# Patient Record
Sex: FEMALE | Race: WHITE | NOT HISPANIC OR LATINO | Employment: OTHER | URBAN - METROPOLITAN AREA
[De-identification: names, ages, dates, MRNs, and addresses within clinical notes are randomized per-mention and may not be internally consistent; named-entity substitution may affect disease eponyms.]

---

## 2020-03-16 ENCOUNTER — VIRTUAL VISIT (OUTPATIENT)
Dept: FAMILY MEDICINE | Facility: OTHER | Age: 35
End: 2020-03-16

## 2020-03-16 NOTE — PROGRESS NOTES
"Date: 2020 11:40:09  Clinician: Miller Cavanaugh  Clinician NPI: [id number]  Patient: Lexy Sinha  Patient : 1985  Patient Address: [address] [place] [id number]  Patient Phone: [phone]  Visit Protocol: URI  Patient Summary:  Lexy is a 35 year old ( : 1985 ) female who initiated a Visit for COVID-19 (Coronavirus) evaluation and screening. When asked the question \"Please sign me up to receive news, health information and promotions. \", Lexy responded \"No\".    Lexy states her symptoms started 1-2 days ago.   Her symptoms consist of malaise, a headache, chills, and nasal congestion. She is experiencing difficulty breathing due to nasal congestion but she is not short of breath. Lexy also feels feverish.   Symptom details     Nasal secretions: The color of her mucus is yellow.    Temperature: Her current temperature is 99.6 degrees Fahrenheit.     Headache: She states the headache is moderate (4-6 on a 10 point pain scale).      Lexy denies having ear pain, rhinitis, facial pain or pressure, myalgias, wheezing, sore throat, cough, and teeth pain. She also denies having recent facial or sinus surgery in the past 60 days, having a sinus infection within the past year, and taking antibiotic medication for the symptoms.   Precipitating events  She has recently been exposed to someone with influenza. Lexy has been in close contact with the following high risk individuals: pregnant women.   Pertinent COVID-19 (Coronavirus) information  Lexy has not traveled internationally or to the areas where COVID-19 (Coronavirus) is widespread in the last 14 days before the start of her symptoms.   Lexy has not had close contact with a suspected or laboratory-confirmed COVID-19 patient within 14 days of symptom onset.   Lexy is not a healthcare worker and does not work in a healthcare facility.   Pertinent medical history  Lexy does not get yeast infections when she takes antibiotics.   " Lexy does not need a return to work/school note.   Weight: 166 lbs   Lexy does not smoke or use smokeless tobacco.   She denies pregnancy and denies breastfeeding. She is currently menstruating.   Weight: 166 lbs    MEDICATIONS: No current medications, ALLERGIES: NKDA  Clinician Response:  Dear Lexy,  Based on the information provided, you have a viral upper respiratory infection, otherwise known as a cold. Symptoms vary from person to person, but can include sneezing, coughing, a runny nose, sore throat, and headache and range from mild to severe.  Unfortunately, there are no medications that can cure a cold, so treatment is focused on controlling symptoms as much as possible. Most people gradually feel better until symptoms are gone in 1-2 weeks.  Medication information  Because you have a viral infection, antibiotics will not help you get better. Treating a viral infection with antibiotics could actually make you feel worse.  Unless you are allergic to the over-the-counter medication(s) below, I recommend using:       Acetaminophen (Tylenol or store brand) oral tablet. Take 1-2 tablets by mouth every 4-6 hours to help with the discomfort.      Ibuprofen (Advil or store brand) 200 mg oral tablet. Take 1-3 tablets (200-600 mg) by mouth every 8 hours to help with the discomfort. Make sure to take the ibuprofen with food. Do not exceed 2400 mg in 24 hours.    An antihistamine such as Benadryl, Claritin, or store brand.    A decongestant such as Sudafed PE or store brand.     Over-the-counter medications do not require a prescription. Ask the pharmacist if you have any questions.  Self care  The following tips will keep you as comfortable as possible while you recover:     Rest    Drink plenty of water and other liquids    Take a hot shower to loosen congestion     When to seek care  Please be seen in a clinic or urgent care if new symptoms develop, or symptoms become worse.  COVID-19 (Coronavirus) General  Information  With the increase in the number of COVID-19 (Coronavirus) cases, we understand you may have some questions. Below is some helpful information on COVID-19 (Coronavirus).  How can I protect myself and others from the COVID-19 (Coronavirus)?  Because there is currently no vaccine to prevent infection, the best way to protect yourself is to avoid being exposed to this virus. Put distance between yourself and other people if COVID-19 (Coronavirus) is spreading in your community. The virus is thought to spread mainly from person-to-person.     Between people who are in close contact with one another (within about 6 about) for prolonged period (10 minutes or longer).    Through respiratory droplets produced when an infected person coughs or sneezes.     The CDC recommends the following additional steps to protect yourself and others:     Wash your hands often with soap and water for at least 20 seconds, especially after blowing your nose, coughing, or sneezing; going to the bathroom; and before eating or preparing food.  Use an alcohol-based hand  that contains at least 60 percent alcohol if soap and water are not available.        Avoid touching your eyes, nose and mouth with unwashed hands.    Avoid close contact with people who are sick.    Stay home when you are sick.    Cover your cough or sneeze with a tissue, then throw the tissue in the trash.    Clean and disinfect frequently touched objects and surfaces.     You can help stop COVID-19 (Coronavirus) by knowing the signs and symptoms:     Fever    Cough    Shortness of breath     Contact your healthcare provider if   Develop symptoms   AND   Have been in close contact with a person known to have COVID-19 (Coronavirus) or live in or have recently traveled from an area with ongoing spread of COVID-19 (Coronavirus). Call ahead before you go to a doctor's office or emergency room. Tell them about your recent travel and your symptoms.   For the  most up to date information, visit the CDC's website.  Steps to help prevent the spread of COVID-19 (Coronavirus) if you are sick  If you are sick with COVID-19 (Coronavirus) or suspect you are infected with the virus that causes COVID-19 (Coronavirus), follow the steps below to help prevent the disease from spreading&nbsp;to people in your home and community.     Stay home except to get medical care. Home isolation may be started in consultation with your healthcare clinician.    Separate yourself from other people and animals in your home.    Call ahead before visiting your doctor if you have a medical appointment.    Wear a facemask when you are around other people.    Cover your cough and sneezes.    Clean your hands often.    Avoid sharing personal household items.    Clean and disinfect frequently touched objects and surfaces everyday.    You will need to have someone drop off medications or household supplies (if needed) at your house without coming inside or in contact with you or others living in your house.    Monitor your symptoms and seek prompt medical care if your illness is worsening (e.g. Difficulty breathing).    Discontinue home isolation only in consultation with your healthcare provider.     For more detailed and up to date information on what to do if you are sick, visit this link: What to Do If You Are Sick With Coronavirus Disease 2019 (COVID-19).  Do I need to be tested for COVID-19 (Coronavirus)?     At this time, the limited number of tests available are controlled by the state and local health departments and are being reserved for more seriously ill patients, those with known exposure to confirmed patients, and those with recent travel (within 14 days) to countries with high rates of COVID-19 (Coronavirus).    Decisions on which patients receive testing will be based on the local spread of COVID-19 (Coronavirus) as well as the symptoms. Your healthcare provider will make the final  "decision on whether you should be tested.    In the meantime, if you have concerns that you may have been exposed, it is reasonable to practice \"social distancing.\"&nbsp; If you are ill with a cold or flu-like illness, please monitor your symptoms and reach out to your healthcare provider if your symptoms worsen.    For more up to date information, visit this link: COVID-19 (Coronavirus) Frequently Asked Questions and Answers.      Diagnosis: Viral URI  Diagnosis ICD: J06.9  "

## 2023-01-21 NOTE — TELEPHONE ENCOUNTER
RECORDS RECEIVED FROM: [facility]   DATE RECEIVED: 2.1.23   NOTES (Gather within 2 years) STATUS DETAILS   OFFICE NOTE from referring provider   CE 1.17.23, 1.13.23, 12.19.22  Petty LOPEZ   LABS (any labs) [facility]    MEDICATION LIST [facility]

## 2023-02-01 ENCOUNTER — PRE VISIT (OUTPATIENT)
Dept: INFECTIOUS DISEASES | Facility: CLINIC | Age: 38
End: 2023-02-01
Payer: COMMERCIAL

## 2023-05-31 ENCOUNTER — TRANSFERRED RECORDS (OUTPATIENT)
Dept: HEALTH INFORMATION MANAGEMENT | Facility: CLINIC | Age: 38
End: 2023-05-31

## 2023-09-11 ENCOUNTER — OFFICE VISIT (OUTPATIENT)
Dept: FAMILY MEDICINE | Facility: CLINIC | Age: 38
End: 2023-09-11
Payer: COMMERCIAL

## 2023-09-11 VITALS
SYSTOLIC BLOOD PRESSURE: 118 MMHG | BODY MASS INDEX: 30.04 KG/M2 | WEIGHT: 180.3 LBS | HEART RATE: 74 BPM | TEMPERATURE: 97.6 F | DIASTOLIC BLOOD PRESSURE: 80 MMHG | HEIGHT: 65 IN | OXYGEN SATURATION: 99 % | RESPIRATION RATE: 20 BRPM

## 2023-09-11 DIAGNOSIS — Z12.4 CERVICAL CANCER SCREENING: ICD-10-CM

## 2023-09-11 DIAGNOSIS — Z13.220 LIPID SCREENING: ICD-10-CM

## 2023-09-11 DIAGNOSIS — E66.811 CLASS 1 OBESITY WITH SERIOUS COMORBIDITY AND BODY MASS INDEX (BMI) OF 30.0 TO 30.9 IN ADULT, UNSPECIFIED OBESITY TYPE: Primary | ICD-10-CM

## 2023-09-11 DIAGNOSIS — R03.0 ELEVATED BLOOD PRESSURE READING WITHOUT DIAGNOSIS OF HYPERTENSION: ICD-10-CM

## 2023-09-11 DIAGNOSIS — Z13.1 SCREENING FOR DIABETES MELLITUS: ICD-10-CM

## 2023-09-11 PROBLEM — M76.62 ACHILLES TENDINITIS OF BOTH LOWER EXTREMITIES: Status: ACTIVE | Noted: 2023-07-19

## 2023-09-11 PROBLEM — N20.1 URETERAL STONE: Status: ACTIVE | Noted: 2020-11-24

## 2023-09-11 PROBLEM — M76.61 ACHILLES TENDINITIS OF BOTH LOWER EXTREMITIES: Status: ACTIVE | Noted: 2023-07-19

## 2023-09-11 PROCEDURE — 99204 OFFICE O/P NEW MOD 45 MIN: CPT | Performed by: FAMILY MEDICINE

## 2023-09-11 ASSESSMENT — PAIN SCALES - GENERAL: PAINLEVEL: NO PAIN (0)

## 2023-09-11 NOTE — PATIENT INSTRUCTIONS
Weight loss -   - semaglutide (OZEMPIC) 2 MG/3ML pen; Inject 0.25 mg Subcutaneous every 7 days for 30 days     Blood pressure -   Low salt diet and activity as tolerated  Monitor your home blood pressures once every other day

## 2023-09-11 NOTE — PROGRESS NOTES
Assessment & Plan     1. Class 1 obesity with serious comorbidity BMI 30.0-30.9,adult  - Ozempic is covered under her insurance.   - Discussed side effects of medication.   - Scheduled for one month visit.   - semaglutide (OZEMPIC) 2 MG/3ML pen; Inject 0.25 mg Subcutaneous every 7 days for 30 days  Dispense: 1.5 mL; Refill: 0    2. Elevated blood pressure reading without diagnosis of hypertension  - Today BP stable  - advised below   Low salt diet and activity as tolerated  Monitor your home blood pressures once every other day    3. Cervical cancer screening  - will schedule with GYN     Lexy Sinha   to [facility] (supporting Percio Gaston MD)   MB      9/14/23  2:03 PM  Hi -     I had a rather scary situation happen to me last night after an MRI (for numbness in L hand) - I had double/blurry vision afterwards and a BP reading of 152/125. The doctors almost sent me to the ER but sent me home instead. Since then, my readings have been high each time (and have been consistently high over the past few months with at-home tracking). I know my reading was low at our recent visit, but I suspect that may be an error as it has been high since March when I began tracking. I am concerned that I should be on medication and am worried about waiting too much longer. I will be leaving for a work trip 9/19-9/25 and would like to figure out what we should do before that, if at all possible.      I would also like new blood work done, if I could (I know I said no at our visit, but think it might be a good idea to get it re-done in this new system.)     Thank you!  Lexy Gaston MD  [facility]    Ella Pugh is a 38 year old, presenting for the following health issues:  Hypertension and Recheck Medication      9/11/2023     8:22 AM   Additional Questions   Roomed by Geni   Accompanied by alone         9/11/2023     8:22 AM   Patient  Reported Additional Medications   Patient reports taking the following new medications none       History of Present Illness       Hypertension: She presents for follow up of hypertension.  She does not check blood pressure  regularly outside of the clinic. Outside blood pressures have been over 140/90. She does not follow a low salt diet.     Reason for visit:  Primary care, blood pressure, weight management    She eats 2-3 servings of fruits and vegetables daily.She consumes 0 sweetened beverage(s) daily.She exercises with enough effort to increase her heart rate 30 to 60 minutes per day.  She exercises with enough effort to increase her heart rate 3 or less days per week.   She is taking medications regularly.     Few months ago she was prescribed medication for blood pressure. She tracked home BP. She travels a lot for work and didn't fill the script. March 2023 - 141/96, 124/99, 118/99, 131/100, 133/99, 144/97. She hasn't tracked recent BP. She is not monitoring salt intake. She was working with  and achilles tendonitis has keep activity limited. She has a achilles procedure scheduled in Nov 2023. She used to walk everyday. She will go for a walk and then will be set back due to pain.     Wegovy was not accepted, Ozempic would cost more money. Since then she has changed insurances and wants to see if it would be covered.         Review of Systems         Objective    LMP 08/24/2023   There is no height or weight on file to calculate BMI.  Physical Exam

## 2023-09-14 ENCOUNTER — NURSE TRIAGE (OUTPATIENT)
Dept: FAMILY MEDICINE | Facility: CLINIC | Age: 38
End: 2023-09-14
Payer: COMMERCIAL

## 2023-09-14 ENCOUNTER — NURSE TRIAGE (OUTPATIENT)
Dept: FAMILY MEDICINE | Facility: CLINIC | Age: 38
End: 2023-09-14

## 2023-09-14 RX ORDER — AMLODIPINE BESYLATE 5 MG/1
5 TABLET ORAL DAILY
Qty: 30 TABLET | Refills: 1 | Status: SHIPPED | OUTPATIENT
Start: 2023-09-14 | End: 2023-10-11

## 2023-09-14 NOTE — ADDENDUM NOTE
Addended by: SHELLIE CARR Y on: 9/14/2023 03:19 PM     Modules accepted: Orders, Level of Service

## 2023-09-14 NOTE — TELEPHONE ENCOUNTER
Ordered lab, please have pt schedule lab only visit  Sent in treatment for Norvasc 5 mg daily, continue to monitor home BP  I would recommend seeing eye provider for vision changes  DM

## 2023-09-14 NOTE — TELEPHONE ENCOUNTER
Called patient. No answer. LVM to call back and ask to speak to a triage nurse.    Patient sent concerning [employer]ISc[employer] message about L hand numbness and blood pressure of 152/125. See 9/14/23 Mychart encounter.     Was last see in office on 9/11/23 and BP was 118/80.     Altagracia Pedraza RN  [facility]

## 2023-09-14 NOTE — TELEPHONE ENCOUNTER
Dr Gaston,    Pt would like to start on medication for hypertension. Please advise. Pt was just seen on 9/11/23 for hypertension.     Rere FULLER RN  [facility]      Nurse Triage SBAR    Is this a 2nd Level Triage? YES, LICENSED PRACTITIONER REVIEW IS REQUIRED    Situation: Pt very hypertensive with recurrent BP readings  142/112 (self check) and pulse of 64. During MRI testing, pt had a near fainting spell.    Background: Pt has had chronic elevated BP since 2021, is severely obese, and chronic numbness and tingling in hands and arm. Pt was recently started on Ozempic for wt loss.     Assessment: No LOC with near fainting episode, A/O x4, reports no cardiac and respiratory issues, BP remains elevated at 142/112.    Protocol Recommended Disposition:   Home Care    Recommendation: Pt is currently not currently being treated for hypertension. Please provide advise.      Routed to provider    Does the patient meet one of the following criteria for ADS visit consideration? No     Reason for Disposition   Sudden standing caused simple fainting, and now alert and feels fine    Additional Information   Negative: Still unconscious   Negative: Still feels dizzy or lightheaded   Negative: Difficult to awaken or acting confused (e.g., disoriented, slurred speech)   Negative: Difficulty breathing   Negative: Bluish (or gray) lips or face   Negative: Shock suspected (e.g., cold/pale/clammy skin, too weak to stand, low BP, rapid pulse)   Negative: Bleeding (e.g., vomiting blood, rectal bleeding or tarry stools, severe vaginal bleeding)   Negative: Chest pain   Negative: Extra heartbeats, irregular heart beating, or heart is beating very fast (i.e., 'palpitations')   Negative: Heart beating < 50 beats per minute OR > 140 beats per minute   Negative: Fainted suddenly after medicine, allergic food or bee sting   Negative: Sounds like a life-threatening emergency to the triager   Negative: Has diabetes  "(diabetes mellitus) and fainting from low blood glucose (70 mg/dl [3.9 mmol/l] or below)   Negative: Seizure suspected (e.g., muscle jerking or shaking followed by confusion)   Negative: Heat exhaustion suspected (i.e., dehydration from heat exposure)   Negative: Fainted > 15 minutes ago and still looks pale (pale skin, pallor)   Negative: Fainted > 15 minutes ago and still feels weak or dizzy   Negative: History of heart problems or congestive heart failure   Negative: Occurred during exercise   Negative: Any head or face injury   Negative: Age > 50 years   Negative: Drinking very little and dehydration suspected (e.g., no urine > 12 hours, very dry mouth, very lightheaded)   Negative: Fainted 2 times in one day   Negative: Patient sounds very sick or weak to the triager   Negative: All other patients, and now alert and feels fine  (Exception: SIMPLE FAINT due to stress, pain, prolonged standing, or suddenly standing.)   Negative: Patient wants to be seen   Negative: Simple fainting is a chronic symptom (has occurred multiple times)    Answer Assessment - Initial Assessment Questions  1. ONSET: \"How long were you unconscious?\" (minutes) \"When did it happen?\"     Fainting occurred after MRI, no LOC  2. CONTENT: \"What happened during period of unconsciousness?\" (e.g., seizure activity)       No LOC    3. MENTAL STATUS: \"Alert and oriented now?\" (oriented x 3 = name, month, location)       A/O x4    4. TRIGGER: \"What do you think caused the fainting?\" \"What were you doing just before you fainted?\"  (e.g., exercise, sudden standing up, prolonged standing)      Just felt faint, did not actually fainted    5. RECURRENT SYMPTOM: \"Have you ever passed out before?\" If Yes, ask: \"When was the last time?\" and \"What happened that time?\"       Never happened before    6. INJURY: \"Did you sustain any injury during the fall?\"       No fall    7. CARDIAC SYMPTOMS: \"Have you had any of the following symptoms: chest pain, difficulty " "breathing, palpitations?\"      No    8. NEUROLOGIC SYMPTOMS: \"Have you had any of the following symptoms: headache, numbness, vertigo, weakness?\"      None    9. GI SYMPTOMS: \"Have you had any of the following symptoms: abdomen pain, vomiting, diarrhea, blood in stools?\"      No    10. OTHER SYMPTOMS: \"Do you have any other symptoms?\"        None  11. PREGNANCY: \"Is there any chance you are pregnant?\" \"When was your last menstrual period?\"        No    Protocols used: [id number]    "

## 2023-09-14 NOTE — TELEPHONE ENCOUNTER
Writer called patient.   Lab. Scheduled lab only appointment.   Norvasc ordered  Recommend eye appointment for vision changes.     Patient stated understanding.     KRAIG Garcia RN  [facility]

## 2023-09-15 ENCOUNTER — LAB (OUTPATIENT)
Dept: LAB | Facility: CLINIC | Age: 38
End: 2023-09-15
Payer: COMMERCIAL

## 2023-09-15 DIAGNOSIS — Z13.220 LIPID SCREENING: ICD-10-CM

## 2023-09-15 DIAGNOSIS — Z13.1 SCREENING FOR DIABETES MELLITUS: ICD-10-CM

## 2023-09-15 DIAGNOSIS — R03.0 ELEVATED BLOOD PRESSURE READING WITHOUT DIAGNOSIS OF HYPERTENSION: ICD-10-CM

## 2023-09-15 LAB
ANION GAP SERPL CALCULATED.3IONS-SCNC: 11 MMOL/L (ref 7–15)
BUN SERPL-MCNC: 17.6 MG/DL (ref 6–20)
CALCIUM SERPL-MCNC: 9.1 MG/DL (ref 8.6–10)
CHLORIDE SERPL-SCNC: 105 MMOL/L (ref 98–107)
CHOLEST SERPL-MCNC: 251 MG/DL
CREAT SERPL-MCNC: 0.91 MG/DL (ref 0.51–0.95)
DEPRECATED HCO3 PLAS-SCNC: 20 MMOL/L (ref 22–29)
EGFRCR SERPLBLD CKD-EPI 2021: 82 ML/MIN/1.73M2
GLUCOSE SERPL-MCNC: 80 MG/DL (ref 70–99)
HBA1C MFR BLD: 5.1 % (ref 0–5.6)
HDLC SERPL-MCNC: 48 MG/DL
LDLC SERPL CALC-MCNC: 182 MG/DL
NONHDLC SERPL-MCNC: 203 MG/DL
POTASSIUM SERPL-SCNC: 4.7 MMOL/L (ref 3.4–5.3)
SODIUM SERPL-SCNC: 136 MMOL/L (ref 136–145)
TRIGL SERPL-MCNC: 104 MG/DL
TSH SERPL DL<=0.005 MIU/L-ACNC: 1.18 UIU/ML (ref 0.3–4.2)

## 2023-09-15 PROCEDURE — 84443 ASSAY THYROID STIM HORMONE: CPT

## 2023-09-15 PROCEDURE — 80048 BASIC METABOLIC PNL TOTAL CA: CPT

## 2023-09-15 PROCEDURE — 83036 HEMOGLOBIN GLYCOSYLATED A1C: CPT

## 2023-09-15 PROCEDURE — 80061 LIPID PANEL: CPT

## 2023-09-15 PROCEDURE — 36415 COLL VENOUS BLD VENIPUNCTURE: CPT

## 2023-10-07 ENCOUNTER — HEALTH MAINTENANCE LETTER (OUTPATIENT)
Age: 38
End: 2023-10-07

## 2023-10-11 ENCOUNTER — VIRTUAL VISIT (OUTPATIENT)
Dept: FAMILY MEDICINE | Facility: CLINIC | Age: 38
End: 2023-10-11
Payer: COMMERCIAL

## 2023-10-11 DIAGNOSIS — I10 PRIMARY HYPERTENSION: ICD-10-CM

## 2023-10-11 DIAGNOSIS — M25.511 RIGHT SHOULDER PAIN, UNSPECIFIED CHRONICITY: ICD-10-CM

## 2023-10-11 DIAGNOSIS — E66.811 CLASS 1 OBESITY WITH SERIOUS COMORBIDITY AND BODY MASS INDEX (BMI) OF 30.0 TO 30.9 IN ADULT, UNSPECIFIED OBESITY TYPE: ICD-10-CM

## 2023-10-11 DIAGNOSIS — L70.9 ACNE, UNSPECIFIED ACNE TYPE: ICD-10-CM

## 2023-10-11 PROCEDURE — 99214 OFFICE O/P EST MOD 30 MIN: CPT | Mod: VID | Performed by: FAMILY MEDICINE

## 2023-10-11 RX ORDER — CLINDAMYCIN PHOSPHATE 11.9 MG/ML
SOLUTION TOPICAL 2 TIMES DAILY
Qty: 60 ML | Refills: 1 | Status: SHIPPED | OUTPATIENT
Start: 2023-10-11

## 2023-10-11 RX ORDER — SPIRONOLACTONE 25 MG/1
25 TABLET ORAL 2 TIMES DAILY
Qty: 60 TABLET | Refills: 1 | Status: SHIPPED | OUTPATIENT
Start: 2023-10-11 | End: 2023-11-15

## 2023-10-11 RX ORDER — AMLODIPINE BESYLATE 5 MG/1
7.5 TABLET ORAL DAILY
Qty: 135 TABLET | Refills: 0 | Status: SHIPPED | OUTPATIENT
Start: 2023-10-11 | End: 2023-10-11

## 2023-10-11 NOTE — PATIENT INSTRUCTIONS
Acne and blood pressure -   Spironolactone 25 mg twice daily  Please continue to monitor your blood pressure  Hold afternoon Spironolactone dose if you are experiencing light headiness or dizziness.     Weight -   Increase Ozempic from 0.25 mg to 0.5 mg once per week     Acne -   Starting clindamycin twice daily     Hives -   Ok to use over the counter allegra once daily for 7 days    Right shoulder -   Follow up with orthopedic provider

## 2023-10-11 NOTE — PROGRESS NOTES
Lexy is a 38 year old who is being evaluated via a billable video visit.      How would you like to obtain your AVS? MyChart  If the video visit is dropped, the invitation should be resent by: Text to cell phone: [phone]  Will anyone else be joining your video visit? No          Assessment & Plan     1. Primary hypertension  - Albumin Random Urine Quantitative with Creat Ratio; Future  - spironolactone (ALDACTONE) 25 MG tablet; Take 1 tablet (25 mg) by mouth 2 times daily for 30 days  Dispense: 60 tablet; Refill: 1    2. Class 1 obesity with serious comorbidity and body mass index (BMI) of 30.0 to 30.9 in adult, unspecified obesity type  semaglutide (OZEMPIC) 2 MG/3ML pen  3 mL 0 10/11/2023  No   Sig - Route: Inject 0.5 mg Subcutaneous every 7 days for 30 days - Subcutaneous     3. Acne, unspecified acne type  - spironolactone (ALDACTONE) 25 MG tablet; Take 1 tablet (25 mg) by mouth 2 times daily for 30 days  Dispense: 60 tablet; Refill: 1  - clindamycin (CLEOCIN T) 1 % external solution; Apply topically 2 times daily  Dispense: 60 mL; Refill: 1    4. Right shoulder pain, unspecified chronicity  - Orthopedic  Referral; Future     Patient Instructions   Acne and blood pressure -   Spironolactone 25 mg twice daily  Please continue to monitor your blood pressure  Hold afternoon Spironolactone dose if you are experiencing light headiness or dizziness.     Weight -   Increase Ozempic from 0.25 mg to 0.5 mg once per week     Acne -   Starting clindamycin twice daily     Hives -   Ok to use over the counter allegra once daily for 7 days    Right shoulder -   Follow up with orthopedic provider             Perico Gaston MD  [facility]    Subjective   Lexy is a 38 year old, presenting for the following health issues:  Recheck Medication (Med Check ), Hypertension, Hives (Hives on Chest at Night ), Derm Problem (Acne ), and Pain (Shoulder Right pain)      10/11/2023     9:00  AM   Additional Questions   Roomed by Lisa Lange       Pain    History of Present Illness       Hypertension: She presents for follow up of hypertension.  She does check blood pressure  regularly outside of the clinic. Outside blood pressures have been over 140/90. She follows a low salt diet.     Reason for visit:  Follow up on high blood pressure, Ozempic, want to ask about shoulder blade pain    She eats 2-3 servings of fruits and vegetables daily.She consumes 0 sweetened beverage(s) daily.She exercises with enough effort to increase her heart rate 20 to 29 minutes per day.  She exercises with enough effort to increase her heart rate 5 days per week.   She is taking medications regularly.     121/89, 123/87, 120/87, 129/92, 123/91     No light headiness, dizziness, chest pain or shortness of breath.     No side effects with norvasc 5 mg     She is really liking ozempic. She notes that food noise is gone. She has started yoga and doing more walks. Side effects - diarrhea and fatigue and feeling better now. Today she is 172.2 lbs. Yesterday she increased ozempic dose to 0.5 mg/week and no side effects.     She has been dealing with cystic acne for sometime. She has tried doxycyline but got rash. She used to be on birth control and that helped. She doesn't want to get back on birth control.     The past couple of days she was having hives and only in her boobs at night. Yesterday and last night it was calm. No new products or food.     She gets repetitive stress injury. She is now having pain in the right shoulder. She had an injection ten years ago which helped. She has difficulty knitting.     Review of Systems         Objective    Vitals - Patient Reported  Systolic (Patient Reported): 121 (10/6/23)  Diastolic (Patient Reported): 89  Pain Score: Mild Pain (3)  Pain Loc: Shoulder        Physical Exam   GENERAL: Healthy, alert and no distress  EYES: Eyes grossly normal to inspection.  No discharge or erythema, or  obvious scleral/conjunctival abnormalities.  RESP: No audible wheeze, cough, or visible cyanosis.  No visible retractions or increased work of breathing.    SKIN: Visible skin clear. No significant rash, abnormal pigmentation or lesions.  NEURO: Cranial nerves grossly intact.  Mentation and speech appropriate for age.  PSYCH: Mentation appears normal, affect normal/bright, judgement and insight intact, normal speech and appearance well-groomed.                Video-Visit Details    Type of service:  Video Visit     Originating Location (pt. Location): Home    Distant Location (provider location):  On-site  Platform used for Video Visit: Janet

## 2023-10-16 NOTE — TELEPHONE ENCOUNTER
DIAGNOSIS: (R) shoulder pain / Perico Gaston MD / [facility] / no imaging or surgeries    APPOINTMENT DATE: 10/30/23   NOTES STATUS DETAILS   OFFICE NOTE from referring provider Internal 10/11/23 - Perico Gaston MD   MEDICATION LIST Internal

## 2023-10-22 ENCOUNTER — MYC MEDICAL ADVICE (OUTPATIENT)
Dept: FAMILY MEDICINE | Facility: CLINIC | Age: 38
End: 2023-10-22
Payer: COMMERCIAL

## 2023-10-22 DIAGNOSIS — L50.9 HIVES: Primary | ICD-10-CM

## 2023-10-24 NOTE — TELEPHONE ENCOUNTER
"Dr. Gaston-Please review and advise.  Dermatology referral pended.  In the meantime, should patient try nightly Benadryl?  Perhaps cool compresses and/or small dabs of Hydrocortisone cream to especially bothersome areas?    \"Hi there -     The hives I mentioned at my last visit that appear almost every night on my breasts have not gone away. I know you said if they were persistent, I should see someone. Could you recommend next steps?     Thank you,  Lexy\"    Thank you!  ANGELITO GoldmanN, RN-[facility]    "
We discussed using daily Allegra.   If allegra not helpful then ok to add Benadryl at night.  DM  
Writer responded via Paper[employer]li.  ANGELITO GoldmanN, RN-BC  [facility]    
40
45

## 2023-10-25 ENCOUNTER — TELEPHONE (OUTPATIENT)
Dept: DERMATOLOGY | Facility: CLINIC | Age: 38
End: 2023-10-25
Payer: COMMERCIAL

## 2023-10-25 NOTE — TELEPHONE ENCOUNTER
Patient Contact    Attempt # 1    Was call answered?  No.    Called patient. No answer. Left message to call back. Clinic number was provided.     Justa Alejandra RN    [facility]   [phone]

## 2023-10-25 NOTE — TELEPHONE ENCOUNTER
This encounter is being sent to inform the clinic that this patient has a referral from Dr. Perico camara for the diagnoses of Hives and has requested that this patient be seen within 1-2 weeks.  Based on the availability of our provider(s), we are unable to accommodate this request.    Were all sites offered this patient?  Yes patient is ok to schedule at any location.    Does scheduling algorithm request to schedule next available?  Patient has been scheduled for the first available opening with Dr. Zunilda Christensen on 3/28/2024.  We have informed the patient that the clinic will review their referral and reach out if a sooner appointment is medically necessary.

## 2023-10-26 NOTE — TELEPHONE ENCOUNTER
Patient Contact    Attempt # 2    Was call answered?  No    Called patient. No answer. Left message to call back. Clinic number was provided.     Justa Alejandra RN    [facility]   [phone]       unchanged

## 2023-10-30 ENCOUNTER — PRE VISIT (OUTPATIENT)
Dept: ORTHOPEDICS | Facility: CLINIC | Age: 38
End: 2023-10-30

## 2023-10-30 ENCOUNTER — OFFICE VISIT (OUTPATIENT)
Dept: ORTHOPEDICS | Facility: CLINIC | Age: 38
End: 2023-10-30
Attending: FAMILY MEDICINE
Payer: COMMERCIAL

## 2023-10-30 DIAGNOSIS — M54.9 UPPER BACK PAIN ON RIGHT SIDE: Primary | ICD-10-CM

## 2023-10-30 PROCEDURE — 99203 OFFICE O/P NEW LOW 30 MIN: CPT | Performed by: FAMILY MEDICINE

## 2023-10-30 NOTE — PROGRESS NOTES
Sports Medicine Clinic Visit    PCP: Perico Gaston Mony Sinha is a 38 year old female who is seen  in consultation at the request of Dr. Gaston presenting with right upper back pain.  She notices it in the muscular area of the right upper back and medial shoulder blade.  No left-sided shoulder symptoms.  No radicular discomfort into the right arm, no numbness or tingling into the right arm.    Injury: Pt notes no specific injuries    Location of Pain: right shoulder blade/upper back  Duration of Pain: 3 year(s), intermittent, recurrent  Rating of Pain: 4/10  Pain is better with: Massage, rest, activity avoidance  Pain is worse with: Knitting, mouse work  Additional Features: Pain  Treatment so far consists of: Massage, rest, activity avoidance  Prior History of related problems: None    LMP 10/07/2023       Patient had mid back trigger point injections at [facility] pain clinic 9/16/2015, for myofascial pain.    MRI of the cervical spine with and without contrast 9/14/2023 showed no significant right-sided central or foraminal stenosis.    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   CONTRAST: GADOBUTROL 1 MMOL/ML IV SOLN 7.5 mL   TECHNIQUE: MRI Cervical Spine without and with IV contrast.   LIMITATIONS: Postcontrast sequences are mildly limited by motion degradation.     FINDINGS:     Vertebral body heights are maintained. No suspicious focal marrow replacing lesions. No focal marrow edema. No cord signal abnormality. No definite enhancement of the cervical spinal cord accounting for motion artifact. Subcentimeter T2 hyperintense nodule in the left lobe of the thyroid gland, which does not require specific imaging follow-up based on lesion size.      Craniovertebral junction and C1-C2: Normal.     C2-C3: Normal disc height. No herniation. Normal facets. No spinal canal or neural foraminal stenosis.     C3-C4: Normal disc height. Mild right neural foraminal stenosis secondary to uncovertebral spurring and slight facet hypertrophy.     C4-C5: Normal disc height. No herniation. Normal facets. No spinal canal or neural foraminal stenosis.     C5-C6: Normal disc height. Small left eccentric posterior disc osteophyte complex without substantial spinal canal or neural foraminal stenosis.     C6-C7: Normal disc height. Tiny posterior disc osteophyte complex without substantial spinal canal or neural foraminal stenosis.     C7-T1: Normal disc height. No herniation. Normal facets. No spinal canal or neural foraminal stenosis.     IMPRESSION:     1. No cord signal abnormality. No definite cord enhancement within limitations of mild motion degradation.   2.  No high-grade spinal canal or neural foraminal stenosis. Mild right neural foraminal stenosis at C3-C4.         XR Chest 2 Views  Narrative    COMPARISON:  None.    FINDINGS:  Two views were obtained.  The lungs and costophrenic angles are clear.  Heart size and pulmonary vascularity are within normal limits.  There is no evidence of pneumothorax or pleural effusion. Bony thorax is unremarkable.  Exam End: 02/19/19 10:27 AM         PMH:  Past Medical History:   Diagnosis Date    Acne        Active problem list:  Patient Active Problem List   Diagnosis    Nephrolithiasis    Achilles tendinitis of both lower extremities    Dysmenorrhea    Ureteral stone    Vaginismus       FH:  Family History   Problem Relation Age of Onset    Hypertension Mother     Hypertension Father        SH:  Social History     Socioeconomic History    Marital status:      Spouse name: Not on file    Number of children: Not on file    Years of education: Not on file    Highest education level: Not on file   Occupational History    Not on  file   Tobacco Use    Smoking status: Never    Smokeless tobacco: Never   Vaping Use    Vaping Use: Never used   Substance and Sexual Activity    Alcohol use: No    Drug use: No    Sexual activity: Not Currently   Other Topics Concern    Not on file   Social History Narrative    Not on file     Social Determinants of Health     Financial Resource Strain: Low Risk  (10/4/2023)    Financial Resource Strain     Within the past 12 months, have you or your family members you live with been unable to get utilities (heat, electricity) when it was really needed?: No   Food Insecurity: Low Risk  (10/4/2023)    Food Insecurity     Within the past 12 months, did you worry that your food would run out before you got money to buy more?: No     Within the past 12 months, did the food you bought just not last and you didn t have money to get more?: No   Transportation Needs: Low Risk  (10/4/2023)    Transportation Needs     Within the past 12 months, has lack of transportation kept you from medical appointments, getting your medicines, non-medical meetings or appointments, work, or from getting things that you need?: No   Physical Activity: Not on file   Stress: Not on file   Social Connections: Not on file   Interpersonal Safety: Not on file   Housing Stability: Low Risk  (10/4/2023)    Housing Stability     Do you have housing? : Yes     Are you worried about losing your housing?: No       MEDS:  See EMR, reviewed  ALL:  See EMR, reviewed    REVIEW OF SYSTEMS:  CONSTITUTIONAL:NEGATIVE for fever, chills, change in weight  INTEGUMENTARY/SKIN: NEGATIVE for worrisome rashes, moles or lesions  EYES: NEGATIVE for vision changes or irritation  ENT/MOUTH: NEGATIVE for ear, mouth and throat problems  RESP:NEGATIVE for significant cough or SOB  BREAST: NEGATIVE for masses, tenderness or discharge  CV: NEGATIVE for chest pain, palpitations or peripheral edema  GI: NEGATIVE for nausea, abdominal pain, heartburn, or change in bowel  habits  :NEGATIVE for frequency, dysuria, or hematuria  :NEGATIVE for frequency, dysuria, or hematuria  NEURO: NEGATIVE for weakness, dizziness or paresthesias  ENDOCRINE: NEGATIVE for temperature intolerance, skin/hair changes  HEME/ALLERGY/IMMUNE: NEGATIVE for bleeding problems  PSYCHIATRIC: NEGATIVE for changes in mood or affect      Objective: She has no impingement signs at the right shoulder.  No scapular winging.  Skin about the shoulder blade appears normal.  Muscular tenderness along the right upper back and medial shoulder blade.  5-5 strength bilaterally at deltoid, supraspinatus, infraspinatus and subscapularis.  In addition biceps, triceps, wrist extension, grasp strength 5 out of 5 bilaterally with no deficits noted.  Sensation is intact in the bilateral upper extremities.  Tends towards a head forward, shoulder forward posture.  Appropriate conversation and affect.    Assessment: Muscular ligamentous right-sided upper back discomfort    Plan: We discussed options including maximizing the ergonomics of the seated position, inserting exercise into her lifestyle such as Pilates or water aerobics or yoga with core strength.  We discussed physical therapy protocols for the upper back and posterior shoulder.  We discussed massage.  Repeat trigger point injections offered, declined for now.  Patient would like to see a physical therapist in [place].  Referral placed.

## 2023-10-30 NOTE — LETTER
10/30/2023      RE: Lexy Sinha  [address]  [facility] [address]     Dear Colleague,    Thank you for referring your patient, Lexy Sinha, to the [facility] [place]. Please see a copy of my visit note below.    Sports Medicine Clinic Visit    PCP: Perico Gaston Y    Lexy Sinha is a 38 year old female who is seen  in consultation at the request of Dr. Gaston presenting with right upper back pain.  She notices it in the muscular area of the right upper back and medial shoulder blade.  No left-sided shoulder symptoms.  No radicular discomfort into the right arm, no numbness or tingling into the right arm.    Injury: Pt notes no specific injuries    Location of Pain: right shoulder blade/upper back  Duration of Pain: 3 year(s), intermittent, recurrent  Rating of Pain: 4/10  Pain is better with: Massage, rest, activity avoidance  Pain is worse with: Knitting, mouse work  Additional Features: Pain  Treatment so far consists of: Massage, rest, activity avoidance  Prior History of related problems: None    LMP 10/07/2023       Patient had mid back trigger point injections at [facility] pain clinic 9/16/2015, for myofascial pain.    MRI of the cervical spine with and without contrast 9/14/2023 showed no significant right-sided central or foraminal stenosis.    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   CONTRAST: GADOBUTROL 1 MMOL/ML IV SOLN 7.5 mL   TECHNIQUE: MRI Cervical Spine without and with IV contrast.   LIMITATIONS: Postcontrast sequences are mildly limited by motion degradation.     FINDINGS:     Vertebral body heights are maintained. No suspicious focal marrow replacing lesions. No focal marrow edema. No cord  signal abnormality. No definite enhancement of the cervical spinal cord accounting for motion artifact. Subcentimeter T2 hyperintense nodule in the left lobe of the thyroid gland, which does not require specific imaging follow-up based on lesion size.     Craniovertebral junction and C1-C2: Normal.     C2-C3: Normal disc height. No herniation. Normal facets. No spinal canal or neural foraminal stenosis.     C3-C4: Normal disc height. Mild right neural foraminal stenosis secondary to uncovertebral spurring and slight facet hypertrophy.     C4-C5: Normal disc height. No herniation. Normal facets. No spinal canal or neural foraminal stenosis.     C5-C6: Normal disc height. Small left eccentric posterior disc osteophyte complex without substantial spinal canal or neural foraminal stenosis.     C6-C7: Normal disc height. Tiny posterior disc osteophyte complex without substantial spinal canal or neural foraminal stenosis.     C7-T1: Normal disc height. No herniation. Normal facets. No spinal canal or neural foraminal stenosis.     IMPRESSION:     1. No cord signal abnormality. No definite cord enhancement within limitations of mild motion degradation.   2.  No high-grade spinal canal or neural foraminal stenosis. Mild right neural foraminal stenosis at C3-C4.         XR Chest 2 Views  Narrative    COMPARISON:  None.    FINDINGS:  Two views were obtained.  The lungs and costophrenic angles are clear.  Heart size and pulmonary vascularity are within normal limits.  There is no evidence of pneumothorax or pleural effusion. Bony thorax is unremarkable.  Exam End: 02/19/19 10:27 AM         PMH:  Past Medical History:   Diagnosis Date     Acne        Active problem list:  Patient Active Problem List   Diagnosis     Nephrolithiasis     Achilles tendinitis of both lower extremities     Dysmenorrhea     Ureteral stone     Vaginismus       FH:  Family History   Problem Relation Age of Onset     Hypertension Mother      Hypertension  Father        SH:  Social History     Socioeconomic History     Marital status:      Spouse name: Not on file     Number of children: Not on file     Years of education: Not on file     Highest education level: Not on file   Occupational History     Not on file   Tobacco Use     Smoking status: Never     Smokeless tobacco: Never   Vaping Use     Vaping Use: Never used   Substance and Sexual Activity     Alcohol use: No     Drug use: No     Sexual activity: Not Currently   Other Topics Concern     Not on file   Social History Narrative     Not on file     Social Determinants of Health     Financial Resource Strain: Low Risk  (10/4/2023)    Financial Resource Strain      Within the past 12 months, have you or your family members you live with been unable to get utilities (heat, electricity) when it was really needed?: No   Food Insecurity: Low Risk  (10/4/2023)    Food Insecurity      Within the past 12 months, did you worry that your food would run out before you got money to buy more?: No      Within the past 12 months, did the food you bought just not last and you didn t have money to get more?: No   Transportation Needs: Low Risk  (10/4/2023)    Transportation Needs      Within the past 12 months, has lack of transportation kept you from medical appointments, getting your medicines, non-medical meetings or appointments, work, or from getting things that you need?: No   Physical Activity: Not on file   Stress: Not on file   Social Connections: Not on file   Interpersonal Safety: Not on file   Housing Stability: Low Risk  (10/4/2023)    Housing Stability      Do you have housing? : Yes      Are you worried about losing your housing?: No       MEDS:  See EMR, reviewed  ALL:  See EMR, reviewed    REVIEW OF SYSTEMS:  CONSTITUTIONAL:NEGATIVE for fever, chills, change in weight  INTEGUMENTARY/SKIN: NEGATIVE for worrisome rashes, moles or lesions  EYES: NEGATIVE for vision changes or irritation  ENT/MOUTH: NEGATIVE  for ear, mouth and throat problems  RESP:NEGATIVE for significant cough or SOB  BREAST: NEGATIVE for masses, tenderness or discharge  CV: NEGATIVE for chest pain, palpitations or peripheral edema  GI: NEGATIVE for nausea, abdominal pain, heartburn, or change in bowel habits  :NEGATIVE for frequency, dysuria, or hematuria  :NEGATIVE for frequency, dysuria, or hematuria  NEURO: NEGATIVE for weakness, dizziness or paresthesias  ENDOCRINE: NEGATIVE for temperature intolerance, skin/hair changes  HEME/ALLERGY/IMMUNE: NEGATIVE for bleeding problems  PSYCHIATRIC: NEGATIVE for changes in mood or affect      Objective: She has no impingement signs at the right shoulder.  No scapular winging.  Skin about the shoulder blade appears normal.  Muscular tenderness along the right upper back and medial shoulder blade.  5-5 strength bilaterally at deltoid, supraspinatus, infraspinatus and subscapularis.  In addition biceps, triceps, wrist extension, grasp strength 5 out of 5 bilaterally with no deficits noted.  Sensation is intact in the bilateral upper extremities.  Tends towards a head forward, shoulder forward posture.  Appropriate conversation and affect.    Assessment: Muscular ligamentous right-sided upper back discomfort    Plan: We discussed options including maximizing the ergonomics of the seated position, inserting exercise into her lifestyle such as Pilates or water aerobics or yoga with core strength.  We discussed physical therapy protocols for the upper back and posterior shoulder.  We discussed massage.  Repeat trigger point injections offered, declined for now.  Patient would like to see a physical therapist in [place].  Referral placed.                          Again, thank you for allowing me to participate in the care of your patient.      Sincerely,    Arsenio Boone MD

## 2023-11-06 ENCOUNTER — MYC MEDICAL ADVICE (OUTPATIENT)
Dept: FAMILY MEDICINE | Facility: CLINIC | Age: 38
End: 2023-11-06
Payer: COMMERCIAL

## 2023-11-06 NOTE — TELEPHONE ENCOUNTER
Does patient need new referral since she cancelled appt? Looks like referral is still active so unsure-thanks!

## 2023-11-08 ENCOUNTER — THERAPY VISIT (OUTPATIENT)
Dept: PHYSICAL THERAPY | Facility: CLINIC | Age: 38
End: 2023-11-08
Attending: FAMILY MEDICINE
Payer: COMMERCIAL

## 2023-11-08 DIAGNOSIS — M54.9 UPPER BACK PAIN ON RIGHT SIDE: ICD-10-CM

## 2023-11-08 PROCEDURE — 97140 MANUAL THERAPY 1/> REGIONS: CPT | Mod: GP | Performed by: PHYSICAL THERAPIST

## 2023-11-08 PROCEDURE — 97161 PT EVAL LOW COMPLEX 20 MIN: CPT | Mod: GP | Performed by: PHYSICAL THERAPIST

## 2023-11-08 PROCEDURE — 97110 THERAPEUTIC EXERCISES: CPT | Mod: GP | Performed by: PHYSICAL THERAPIST

## 2023-11-08 NOTE — PROGRESS NOTES
"PHYSICAL THERAPY EVALUATION  Type of Visit: Evaluation    See electronic medical record for Abuse and Falls Screening details.    Subjective   KEY PT FINDINGS:  1) Full shoulder motion  2) No pain with resisted testing  3) Significant weakness of scapular stabilizers with poor upper back posture    Physical Therapy Initial Evaluation: Subjective History     Injury/Condition Details:  Presenting Complaint Right shoulder blade   Onset Timing/Date \"Started a long time ago\"   Mechanism Got a shot in the shoulder blade which helped out for 10 years but it has since worn off. No other change in activities to cause the onset of pain again.   This is most pronounced with knitting, can only do 1 row before painful.   Working a couple of days in a row is also painful.      Symptom Behavior Details    Primary Symptoms Sporadic symptoms; Activity/position dependent, pain (Location: see below, Quality: Sharp during irritating activities and Aching/Throbbing on a daily basis), catching/locking  Right shoulder blade, a little on the left.   Denies significant pain down the arm with provoking activities.   Denies neck pain, denies headaches associated with this pain.    Worst Pain 8/10 (with see below)   Symptom Provocators Knitting   Working @ her desk - using the mouse, leaning forward to look at screen   Best Pain 0/10    Symptom Relievers Some relief with desk adjustments  Mousing with the arm back   Does not use tylenol/ibuprofen or ice/heat   Time of day dependent? Worse in evening after activity   Recent symptom change? no change in symptoms     Prior Testing/Intervention for current condition:  Prior Tests None recently   Prior Treatment Injections: into the muscle (yes, helpful)         Presenting condition or subjective complaint: Right shoulder blade pain  Date of onset: 10/30/23    Relevant medical history: High blood pressure   Dates & types of surgery:      Prior diagnostic imaging/testing results:       Prior therapy " history for the same diagnosis, illness or injury: No      Living Environment  Social support: With a significant other or spouse   Type of home: House   Stairs to enter the home: Yes 6 Is there a railing: Yes   Ramp: No   Stairs inside the home: Yes 10 Is there a railing: Yes   Help at home:    Equipment owned:       Employment: Yes Content Creator  Hobbies/Interests: improv comedy, walking, games, travel, knitting, writing    Patient goals for therapy: Knitting, regular computer use       Objective   PHYSICAL THERAPY CERVICAL EXAMINATION    Posture: Forward head, rounded shoulders, increased thoracic kyphosis   Cervical Spine ROM Screen   RIGHT LEFT   Cervical Spine ROM   Flexion Normal   Extension Normal - boarderline hypermobile   Rotation Normal Normal   Sidebend Normal Normal   Seated Thoracic Spine ROM   Rotation NEXT NEXT   Flexion No limit No limit   Extension Mild limit Mild limit     Passive Joint Mobility Screen: Deferred    Scapular strength:   Middle trap: 4-/5  Lower trap: 3/5    Shoulder ROM: WNL, no pain produced with ROM  Resisted testing: negative    Tender to palpation at the following structures: Medial scpaular boarder and medial scapular muscles, upper trap  NOT tender to palpation at the following structures:      SUSPECTED DIRECTIONAL PREFERENCE: N/A     Upper Extremity Neural System Examination   Right Left   NEURAL CONDUCTION     Dermatome Screen (sensation) - -   Myotome Screen (motor) - -   NEURAL TENSION     Upper Limb Tension Testing - -     Upper Extremity Flexibility Screen:   Right Left   Pec Major + +   Pec Minor + +   Upper Trap + +   Levator Scapula - -   Scalenes - -   SubOccipital - -   Erector Spinae  - -   - = normal  + = mild tightness  ++ = moderate tightness  +++ = significant tightness    Response to Traction: Not tried today    Assessment & Plan   CLINICAL IMPRESSIONS  Medical Diagnosis: Right upper back pain    Treatment Diagnosis: Right upper back pain    Impression/Assessment: Patient is a 38 year old female with right upper back complaints.  The following significant findings have been identified: Pain, Decreased ROM/flexibility, Decreased strength, Decreased activity tolerance, and Impaired posture. These impairments interfere with their ability to perform self care tasks, work tasks, and recreational activities as compared to previous level of function.     Clinical Decision Making (Complexity):  Clinical Presentation: Stable/Uncomplicated  Clinical Presentation Rationale: based on medical and personal factors listed in PT evaluation  Clinical Decision Making (Complexity): Low complexity    PLAN OF CARE  Treatment Interventions:  Interventions: Manual Therapy, Neuromuscular Re-education, Therapeutic Activity, Therapeutic Exercise    Long Term Goals     PT Goal 1  Goal Identifier: Knitting  Goal Description: Patient to report increased tolerance to knitting > 30 minutes in a seated position and report pain 2/10 or less  Rationale: to maximize safety and independence with performance of ADLs and functional tasks  Target Date: 12/21/23      Frequency of Treatment: 1x/week  Duration of Treatment: 6 weeks    Recommended Referrals to Other Professionals: Physical Therapy  Education Assessment:   Learner/Method: Patient;No Barriers to Learning    Risks and benefits of evaluation/treatment have been explained.   Patient/Family/caregiver agrees with Plan of Care.     Evaluation Time:     PT Eval, Low Complexity Minutes (31058): 16     Signing Clinician: Irina Sanabria, PT

## 2023-11-09 ENCOUNTER — TELEPHONE (OUTPATIENT)
Dept: DERMATOLOGY | Facility: CLINIC | Age: 38
End: 2023-11-09
Payer: COMMERCIAL

## 2023-11-09 NOTE — TELEPHONE ENCOUNTER
This encounter is being sent to inform the clinic that this patient has a referral from Perico Gaston MD for the diagnoses of Hives [L50.9], Hives on breasts nightly and has requested that this patient be seen within 1-2 weeks.  Based on the availability of our provider(s), we are unable to accommodate this request.    Were all sites offered this patient?  Yes    Does scheduling algorithm request to schedule next available?  Patient has been scheduled for the first available opening with Cyndee Dubon PA-C on 2/20/24.  We have informed the patient that the clinic will review their referral and reach out if a sooner appointment is medically necessary.

## 2023-11-09 NOTE — TELEPHONE ENCOUNTER
Patient Contact    Attempt # 1    Was call answered?  No    Left message on answering machine for patient to call back.    Antonina CROFT RN  Dermatology   [phone]

## 2023-11-10 NOTE — TELEPHONE ENCOUNTER
Spoke with pt and scheduled sooner appt/    Thank you,  Antonina CROFT RN  Dermatology   [phone]

## 2023-11-13 ENCOUNTER — OFFICE VISIT (OUTPATIENT)
Dept: DERMATOLOGY | Facility: CLINIC | Age: 38
End: 2023-11-13
Attending: FAMILY MEDICINE
Payer: COMMERCIAL

## 2023-11-13 DIAGNOSIS — L29.9 PRURITUS: ICD-10-CM

## 2023-11-13 DIAGNOSIS — L50.9 URTICARIA: Primary | ICD-10-CM

## 2023-11-13 PROCEDURE — 99204 OFFICE O/P NEW MOD 45 MIN: CPT | Performed by: STUDENT IN AN ORGANIZED HEALTH CARE EDUCATION/TRAINING PROGRAM

## 2023-11-13 RX ORDER — FEXOFENADINE HCL 180 MG/1
180 TABLET ORAL 2 TIMES DAILY
Qty: 60 TABLET | Refills: 3 | Status: SHIPPED | OUTPATIENT
Start: 2023-11-13 | End: 2024-05-16

## 2023-11-13 ASSESSMENT — PAIN SCALES - GENERAL: PAINLEVEL: NO PAIN (0)

## 2023-11-13 NOTE — PROGRESS NOTES
[facility] Dermatology Note    Encounter Date: Nov 13, 2023    Dermatology Problem List:  \  ______________________________________    Impression/Plan:  Lexy was seen today for hives.    Diagnoses and all orders for this visit:      Pruritus  Urticaria  -     fexofenadine (ALLEGRA) 180 MG tablet; Take 1 tablet (180 mg) by mouth 2 times daily  -Intermittent, but improving without a clear infectious trigger  - Did take Benadryl but ended up just falling asleep and since the itching does not interfere and never has interfered with her ability to fall asleep unclear whether it was histamine responsive  - Start Allegra 1 pill twice daily, continue until symptom-free for 4 weeks and then can attempt tapering or discontinuation, advised her to let me know if she flares  - Discussed that if the hives or urticaria become chronic then we can consider other options like oral dapsone or injectable omalizumab, defer those for now    Other orders  -     Adult Dermatology  Referral        Follow-up after antihistamine trial .       Staff Involved:  Staff Only    Van Conrad MD   of Dermatology  Department of Dermatology  [facility]      CC:   Chief Complaint   Patient presents with    Hives       History of Present Illness:  Ms. Lexy Sinha is a 38 year old female who presents as a new patient.    Getting hives on breast. Took one benadryl at night. Itching does not intefere w/ ability to sleep. Stopped biotin gummy. Things gotbetter. Went on a trip and recurred. But hasn't happened since 11/4. Itching lasts for for a few hours.     Labs:      Physical exam:  Vitals: LMP 10/30/2023 (Exact Date)   Breastfeeding No   GEN: well developed, well-nourished, in no acute distress, in a pleasant mood.     SKIN: Chavez phototype 1  - Sun-exposed skin, which includes the head/face, neck, both arms, digits, and/or nails was examined.   - no hives  visible  - hives on breasts on phone photos   - No other lesions of concern on areas examined.     Past Medical History:   Past Medical History:   Diagnosis Date    Acne      Past Surgical History:   Procedure Laterality Date    PARTIAL HYMENECTOMY         Social History:   reports that she has never smoked. She has never used smokeless tobacco. She reports that she does not drink alcohol and does not use drugs.    Family History:  Family History   Problem Relation Age of Onset    Hypertension Mother     Hypertension Father     Melanoma Paternal Aunt        Medications:  Current Outpatient Medications   Medication Sig Dispense Refill    fexofenadine (ALLEGRA) 180 MG tablet Take 1 tablet (180 mg) by mouth 2 times daily 60 tablet 3    semaglutide (OZEMPIC, 0.25 OR 0.5 MG/DOSE,) 2 MG/3ML pen INJECT 0.5 MG SUBCUTANEOUS EVERY 7 DAYS FOR 30 DAYS 3 mL 0    clindamycin (CLEOCIN T) 1 % external solution Apply topically 2 times daily (Patient not taking: Reported on 11/13/2023) 60 mL 1    spironolactone (ALDACTONE) 25 MG tablet Take 1 tablet (25 mg) by mouth 2 times daily for 30 days 60 tablet 1     Allergies   Allergen Reactions    Doxycycline Rash    Nickel Rash

## 2023-11-13 NOTE — LETTER
11/13/2023         RE: Lexy Sinha  [address]  [facility] [address]        Dear Colleague,    Thank you for referring your patient, Lexy Sinha, to the [facility]. Please see a copy of my visit note below.    [facility] Dermatology Note    Encounter Date: Nov 13, 2023    Dermatology Problem List:  \  ______________________________________    Impression/Plan:  Lexy was seen today for hives.    Diagnoses and all orders for this visit:      Pruritus  Urticaria  -     fexofenadine (ALLEGRA) 180 MG tablet; Take 1 tablet (180 mg) by mouth 2 times daily  -Intermittent, but improving without a clear infectious trigger  - Did take Benadryl but ended up just falling asleep and since the itching does not interfere and never has interfered with her ability to fall asleep unclear whether it was histamine responsive  - Start Allegra 1 pill twice daily, continue until symptom-free for 4 weeks and then can attempt tapering or discontinuation, advised her to let me know if she flares  - Discussed that if the hives or urticaria become chronic then we can consider other options like oral dapsone or injectable omalizumab, defer those for now    Other orders  -     Adult Dermatology  Referral        Follow-up after antihistamine trial .       Staff Involved:  Staff Only    Van Conrad MD   of Dermatology  Department of Dermatology  [facility]      CC:   Chief Complaint   Patient presents with     Hives       History of Present Illness:  Ms. Lexy Sinha is a 38 year old female who presents as a new patient.    Getting hives on breast. Took one benadryl at night. Itching does not intefere w/ ability to sleep. Stopped biotin gummy. Things gotbetter. Went on a trip and recurred. But hasn't happened since 11/4. Itching lasts for for a few hours.     Labs:      Physical exam:  Vitals: LMP  10/30/2023 (Exact Date)   Breastfeeding No   GEN: well developed, well-nourished, in no acute distress, in a pleasant mood.     SKIN: Chavez phototype 1  - Sun-exposed skin, which includes the head/face, neck, both arms, digits, and/or nails was examined.   - no hives visible  - hives on breasts on phone photos   - No other lesions of concern on areas examined.     Past Medical History:   Past Medical History:   Diagnosis Date     Acne      Past Surgical History:   Procedure Laterality Date     PARTIAL HYMENECTOMY         Social History:   reports that she has never smoked. She has never used smokeless tobacco. She reports that she does not drink alcohol and does not use drugs.    Family History:  Family History   Problem Relation Age of Onset     Hypertension Mother      Hypertension Father      Melanoma Paternal Aunt        Medications:  Current Outpatient Medications   Medication Sig Dispense Refill     fexofenadine (ALLEGRA) 180 MG tablet Take 1 tablet (180 mg) by mouth 2 times daily 60 tablet 3     semaglutide (OZEMPIC, 0.25 OR 0.5 MG/DOSE,) 2 MG/3ML pen INJECT 0.5 MG SUBCUTANEOUS EVERY 7 DAYS FOR 30 DAYS 3 mL 0     clindamycin (CLEOCIN T) 1 % external solution Apply topically 2 times daily (Patient not taking: Reported on 11/13/2023) 60 mL 1     spironolactone (ALDACTONE) 25 MG tablet Take 1 tablet (25 mg) by mouth 2 times daily for 30 days 60 tablet 1     Allergies   Allergen Reactions     Doxycycline Rash     Nickel Rash               Again, thank you for allowing me to participate in the care of your patient.        Sincerely,        Van Conrad MD

## 2023-11-13 NOTE — PATIENT INSTRUCTIONS
Take 1 pill allegra twice daily for 4 weeks. If no symptomatic episodes, can discontinue at that point. Update me if you flare

## 2023-11-14 ENCOUNTER — THERAPY VISIT (OUTPATIENT)
Dept: PHYSICAL THERAPY | Facility: CLINIC | Age: 38
End: 2023-11-14
Attending: FAMILY MEDICINE
Payer: COMMERCIAL

## 2023-11-14 DIAGNOSIS — M54.9 UPPER BACK PAIN ON RIGHT SIDE: Primary | ICD-10-CM

## 2023-11-14 PROCEDURE — 97140 MANUAL THERAPY 1/> REGIONS: CPT | Mod: GP | Performed by: PHYSICAL THERAPIST

## 2023-11-14 PROCEDURE — 97110 THERAPEUTIC EXERCISES: CPT | Mod: GP | Performed by: PHYSICAL THERAPIST

## 2023-11-15 ENCOUNTER — OFFICE VISIT (OUTPATIENT)
Dept: FAMILY MEDICINE | Facility: CLINIC | Age: 38
End: 2023-11-15
Payer: COMMERCIAL

## 2023-11-15 VITALS
SYSTOLIC BLOOD PRESSURE: 121 MMHG | WEIGHT: 167 LBS | RESPIRATION RATE: 15 BRPM | HEIGHT: 65 IN | BODY MASS INDEX: 27.82 KG/M2 | HEART RATE: 72 BPM | OXYGEN SATURATION: 98 % | DIASTOLIC BLOOD PRESSURE: 83 MMHG | TEMPERATURE: 97 F

## 2023-11-15 DIAGNOSIS — I10 PRIMARY HYPERTENSION: ICD-10-CM

## 2023-11-15 DIAGNOSIS — Z12.4 CERVICAL CANCER SCREENING: ICD-10-CM

## 2023-11-15 DIAGNOSIS — L70.9 ACNE, UNSPECIFIED ACNE TYPE: ICD-10-CM

## 2023-11-15 DIAGNOSIS — Z28.21 IMMUNIZATION DECLINED: ICD-10-CM

## 2023-11-15 LAB
ANION GAP SERPL CALCULATED.3IONS-SCNC: 9 MMOL/L (ref 7–15)
BUN SERPL-MCNC: 12.1 MG/DL (ref 6–20)
CALCIUM SERPL-MCNC: 9.5 MG/DL (ref 8.6–10)
CHLORIDE SERPL-SCNC: 107 MMOL/L (ref 98–107)
CREAT SERPL-MCNC: 0.93 MG/DL (ref 0.51–0.95)
CREAT UR-MCNC: 123 MG/DL
DEPRECATED HCO3 PLAS-SCNC: 22 MMOL/L (ref 22–29)
EGFRCR SERPLBLD CKD-EPI 2021: 80 ML/MIN/1.73M2
GLUCOSE SERPL-MCNC: 87 MG/DL (ref 70–99)
MICROALBUMIN UR-MCNC: <12 MG/L
MICROALBUMIN/CREAT UR: NORMAL MG/G{CREAT}
POTASSIUM SERPL-SCNC: 4.7 MMOL/L (ref 3.4–5.3)
SODIUM SERPL-SCNC: 138 MMOL/L (ref 135–145)

## 2023-11-15 PROCEDURE — 99214 OFFICE O/P EST MOD 30 MIN: CPT | Performed by: FAMILY MEDICINE

## 2023-11-15 PROCEDURE — 80048 BASIC METABOLIC PNL TOTAL CA: CPT | Performed by: FAMILY MEDICINE

## 2023-11-15 PROCEDURE — 82570 ASSAY OF URINE CREATININE: CPT | Performed by: FAMILY MEDICINE

## 2023-11-15 PROCEDURE — 82043 UR ALBUMIN QUANTITATIVE: CPT | Performed by: FAMILY MEDICINE

## 2023-11-15 PROCEDURE — 36415 COLL VENOUS BLD VENIPUNCTURE: CPT | Performed by: FAMILY MEDICINE

## 2023-11-15 RX ORDER — SPIRONOLACTONE 25 MG/1
25 TABLET ORAL 2 TIMES DAILY
Qty: 180 TABLET | Refills: 3 | Status: SHIPPED | OUTPATIENT
Start: 2023-11-15 | End: 2024-11-09

## 2023-11-15 ASSESSMENT — PAIN SCALES - GENERAL: PAINLEVEL: NO PAIN (0)

## 2023-11-15 NOTE — PROGRESS NOTES
Assessment & Plan     Primary hypertension  - controlled, continue current regimen   - Basic metabolic panel  (Ca, Cl, CO2, Creat, Gluc, K, Na, BUN); Future  - Albumin Random Urine Quantitative with Creat Ratio; Future  - spironolactone (ALDACTONE) 25 MG tablet; Take 1 tablet (25 mg) by mouth 2 times daily for 360 days  Dispense: 180 tablet; Refill: 3  - Albumin Random Urine Quantitative with Creat Ratio  - Basic metabolic panel  (Ca, Cl, CO2, Creat, Gluc, K, Na, BUN)    Acne  - Doing well, continue current regimen  - Basic metabolic panel  (Ca, Cl, CO2, Creat, Gluc, K, Na, BUN); Future  - spironolactone (ALDACTONE) 25 MG tablet; Take 1 tablet (25 mg) by mouth 2 times daily for 360 days  Dispense: 180 tablet; Refill: 3  - Basic metabolic panel  (Ca, Cl, CO2, Creat, Gluc, K, Na, BUN)    Cervical cancer screening  - Ob/Gyn  Referral; Future    Immunization declined        Deqa Autumn Gaston MD  [facility]    Ella Pugh is a 38 year old, presenting for the following health issues:  Follow up after starting new medication for acne and HTN.  No chief complaint on file.      11/15/2023    10:07 AM   Additional Questions   Roomed by Lisa ETIENNE  Lexy is a 38 year old female with Past medical history of HTN, obesity and acne that comes today to follow up after starting spironolactone 25 mg tab BID for HTN and acne. She refers feeling well. Taking medication as prescribed. No dizziness, lightheadedness, CP, headacjes, blurry vision or tinnitus. Denies changes in urine.   Measures her BP at home after taking morning spirinolactone. Last BP measurements are:  114/84   132/93   124/91   134/96    Has noted some diarrhea (whenn eating big meals only, not every day) and fatigue after increasing Ozempic to 0.5. Will need refill of Ozempic today.    Walks every weeks and goes to yoga. Has modified her diet to be more healthy. More vegetables, less sugar, more fruits. Takes   1 bottle of water a day. 2 bottles of water with electrolytes and 1 can of energy water a day. No coffee or tea.     Went to dermatology this week for hives. Hives much better. Resolved today. Has Allegra in case she it and will follow up with dermatology in 4 weeks.                  Review of Systems   None except what is in HPI      Objective    LMP 10/30/2023 (Exact Date)   There is no height or weight on file to calculate BMI.  Physical Exam   GENERAL: Healthy, alert and no distress  EYES: Eyes grossly normal to inspection. Aniscteric sclera. No discharge or erythema, or obvious scleral/conjunctival abnormalities.  RESP: CTA bilaterally. No audible wheeze, cough, or visible cyanosis.  No visible retractions or increased work of breathing.    CV: RRR, no murmurs or gallops.   SKIN: Visible skin clear. No significant rash, abnormal pigmentation or lesions.  NEURO: Cranial nerves grossly intact.  Mentation and speech appropriate for age.  PSYCH: Mentation appears normal, affect normal/bright, judgement and insight intact, normal speech and appearance well-groomed.       A/P  Primary HTN  Controlled. Bps in the 120s low 130s/ 80s. Patient compliant with medication. No side effects for now.   -Continue taking spirinolactone 25 mg BID  -Continue checking BP at least every week.  -BMP today  -Albumin quatitative random urine Cr ratio to check kidneys.     2. Cystic acne  Patient refers acne comes with period. Today clear skin. Using clindamycin for spot treatment. Spironolactone helping.     3. Obesity  Patient in Ozempic 0.5 mg subcutaneous injection with good results. Today weights 167 lbs. BMI 27.82. On September she was 180 lbs. Patient will like to continue medication. She has had some episodes of diarrhea and loose stools that corelate when she has big meals. It seems her insurance wont cover any more Ozempic. On 11/12 medication was refilled. Patient will call insurance to have more information and will contact  Dr. Xie to see how to continue forward depending on coverage.   -Continue ozempic 0.5 mg subcutaneous injection.     4. Health maintenance  Patient Denied Flu shot and Covid 19 vaccine today. Will do it in a later date. Last pap smear on 2017 negative. Overdue. Patient refers it be done by ObGyn. Will refer to ObGyn for her to schedule appointment.  -Ob/Gyn referral for pap Smear.

## 2023-11-21 ENCOUNTER — OFFICE VISIT (OUTPATIENT)
Dept: OPTOMETRY | Facility: CLINIC | Age: 38
End: 2023-11-21
Payer: COMMERCIAL

## 2023-11-21 DIAGNOSIS — I10 PRIMARY HYPERTENSION: ICD-10-CM

## 2023-11-21 DIAGNOSIS — H53.8 BLURRED VISION: Primary | ICD-10-CM

## 2023-11-21 DIAGNOSIS — H18.20: ICD-10-CM

## 2023-11-21 PROCEDURE — 92004 COMPRE OPH EXAM NEW PT 1/>: CPT | Performed by: OPTOMETRIST

## 2023-11-21 RX ORDER — NEOMYCIN SULFATE, POLYMYXIN B SULFATE AND DEXAMETHASONE 3.5; 10000; 1 MG/ML; [USP'U]/ML; MG/ML
SUSPENSION/ DROPS OPHTHALMIC
Qty: 3.85 ML | Refills: 0 | Status: SHIPPED | OUTPATIENT
Start: 2023-11-21 | End: 2023-12-19

## 2023-11-21 ASSESSMENT — KERATOMETRY
OD_AXISANGLE2_DEGREES: 128
OD_K1POWER_DIOPTERS: 47.75
OD_K2POWER_DIOPTERS: 48.00
OD_AXISANGLE_DEGREES: 038
OS_AXISANGLE2_DEGREES: 121
OS_K2POWER_DIOPTERS: 48.00
OS_AXISANGLE_DEGREES: 031
OS_K1POWER_DIOPTERS: 47.75

## 2023-11-21 ASSESSMENT — CONF VISUAL FIELD
OS_INFERIOR_NASAL_RESTRICTION: 0
OD_NORMAL: 1
OD_SUPERIOR_NASAL_RESTRICTION: 0
OS_INFERIOR_TEMPORAL_RESTRICTION: 0
OD_SUPERIOR_TEMPORAL_RESTRICTION: 0
OD_INFERIOR_TEMPORAL_RESTRICTION: 0
OS_SUPERIOR_TEMPORAL_RESTRICTION: 0
OS_SUPERIOR_NASAL_RESTRICTION: 0
OD_INFERIOR_NASAL_RESTRICTION: 0
METHOD: COUNTING FINGERS
OS_NORMAL: 1

## 2023-11-21 ASSESSMENT — REFRACTION_WEARINGRX
OS_SPHERE: -4.25
OD_SPHERE: -4.25
OD_AXIS: 020
OS_AXIS: 015
OS_CYLINDER: +0.75
OD_CYLINDER: +0.75

## 2023-11-21 ASSESSMENT — EXTERNAL EXAM - RIGHT EYE: OD_EXAM: NORMAL

## 2023-11-21 ASSESSMENT — TONOMETRY
OS_IOP_MMHG: 13
IOP_METHOD: APPLANATION
OD_IOP_MMHG: 11

## 2023-11-21 ASSESSMENT — REFRACTION_MANIFEST
OS_SPHERE: -3.50
OD_CYLINDER: SPHERE
OS_CYLINDER: SPHERE
OD_SPHERE: -3.50
METHOD_AUTOREFRACTION: 1

## 2023-11-21 ASSESSMENT — VISUAL ACUITY
CORRECTION_TYPE: CONTACTS
OD_CC: 20/20
OS_CC: 20/20
METHOD: SNELLEN - LINEAR
OD_CC: 20/20
OS_CC: 20/25

## 2023-11-21 ASSESSMENT — CUP TO DISC RATIO
OS_RATIO: 0.3
OD_RATIO: 0.3

## 2023-11-21 ASSESSMENT — REFRACTION_CURRENTRX
OD_BRAND: J&J ACUVUE OASYS BC 8.4, D 14.0
OD_SPHERE: -3.75
OS_BRAND: J&J ACUVUE OASYS BC 8.4, D 14.0
OS_SPHERE: -3.75

## 2023-11-21 ASSESSMENT — EXTERNAL EXAM - LEFT EYE: OS_EXAM: NORMAL

## 2023-11-21 NOTE — PATIENT INSTRUCTIONS
There is no affects of the high blood pressure affecting the eyes noted on today's eye exam.    There is some swelling of the front of the eyes (cornea) left eye > right eye that may be do to contact lens wear.    Maxitrol- 1 drop both eyes 4 x day for 7 days, 3 x day for 7 days, 2 x day for 14 days until seen.    Non preserved artificial tears- 1 drop both eyes 4 x day.    Return in 1 month for recheck cornea and refraction.  There may be some vision changes as the cornea heals.    [place]io Adams, OD    The affects of the dilating drops last for 4- 6 hours.  You will be more sensitive to light and vision will be blurry up close.  Do not drive if you do not feel comfortable.  Mydriatic sunglasses were given if needed.      Optometry Providers       Clinic Locations                                 Telephone Number   Dr. Arelis Paige [facility]    [facility]  [place] [phone]     [facility]:                [facility] Hours:       [facility]:   [address]   [address] [address]     [address] [place] [address]   [place] [address]    [place] [address]  Phone: [phone]                    Phone: [phone]     Phone: [phone]                      Monday 8:00-6:00                          Monday 8:00-6:00                          Monday 8:00-6:00              Tuesday 8:00-6:00                          Tuesday 8:00-6:00                          Tuesday 8:00-6:00              Wednesday 8:00-6:00                  Wednesday 8:00-6:00                   Wednesday 8:00-6:00      Thursday 8:00-6:00                        Thursday 8:00-6:00                         Thursday 8:00-6:00            Friday 8:00-5:00                              Friday 8:00-5:00                              Friday 8:00-5:00    [facility] Hours:    [address] [place] [address]  [phone]    Monday 9:00-6:00  Tuesday 9:00-6:00  Wednesday 9:00-6:00  Thursday 9:00-6:00  Friday 9:00-5:00  As always, Thank you for trusting us with your health care needs!

## 2023-11-21 NOTE — LETTER
"    11/21/2023         RE: Lexy Sinha  [address]  [facility] [address]        Dear Colleague,    Thank you for referring your patient, Lexy Sinha, to the [facility]. Please see a copy of my visit note below.    Chief Complaint   Patient presents with     Blurred Vision Evaluation     Patient had a double vision episode due to high blood pressure during a MRI 2 months ago. Os eye still seems not right and is a greater change than od eye        Laterality: left eye   Onset: sudden   Onset: 2 months ago   Severity: severe   Frequency: episodically   Course: gradually improving   Associated symptoms: double vision   Comments: Patient had a double vision episode due to high blood pressure during a MRI 2 months ago. Os eye still seems not right and is a greater change than od eye        Copied from Urgent Care visit 9/14/2023    \"Glen Levin RN 9/14/2023, 12:14 PM     SUBJECTIVE: Lexy Sinha is a 38 y.o. old female here for elevated blood following MRI her head that she had yesterday. She states that when she was done with her MRI she had some blurry and double vision. The staff sat her down and did a blood pressure on her and stated that it was extremely high, they discussed whether she should be seen in the ER however as they were monitoring her her symptoms went away, her blood pressure remained high but they went ahead and sent her home. Patient states that she has been checking her blood pressure on a daily basis and states that it has been running high for quite some time. She was seen in April 2023 and was put on amlodipine however she never picked up the medication, she went to try to  the medication the other day, due to her ongoing home readings being high and the pharmacy stated that they no longer had the prescription. She did not reach out her doctor who she fell to be too long of a wait. To hear back from them. She denies chest pain, " "shortness of breath, lightheadedness, dizziness and no headache complaint at this time. She states that vision appears to be normal at this time. \"     Her blood pressure is now controlled    Reviewed records from 5/31/2023 from [facility]'s were 20/20 right eye, 20/20 left eye.    Wears Acuvue Oasys contact lenses 50% of the time      Medical, surgical and family histories reviewed and updated 11/21/2023.       OBJECTIVE: See Ophthalmology exam    ASSESSMENT:    ICD-10-CM    1. Blurred vision  H53.8       2. Primary hypertension  I10       3. Microcystic edema of cornea  H18.20 neomycin-polymyxin-dexAMETHasone (MAXITROL) 3.5-13447-3.1 SUSP ophthalmic susp         PLAN:     Patient Instructions   There is no affects of the high blood pressure affecting the eyes noted on today's eye exam.    There is some swelling of the front of the eyes (cornea) left eye > right eye that may be do to contact lens wear.    Maxitrol- 1 drop both eyes 4 x day for 7 days, 3 x day for 7 days, 2 x day for 14 days until seen.    Non preserved artificial tears- 1 drop both eyes 4 x day.    Return in 1 month for recheck cornea and refraction.  There may be some vision changes as the cornea heals.    [place] Adams, OD         Again, thank you for allowing me to participate in the care of your patient.        Sincerely,        [place] Adams, OD  "

## 2023-11-21 NOTE — PROGRESS NOTES
"Chief Complaint   Patient presents with    Blurred Vision Evaluation     Patient had a double vision episode due to high blood pressure during a MRI 2 months ago. Os eye still seems not right and is a greater change than od eye        Laterality: left eye   Onset: sudden   Onset: 2 months ago   Severity: severe   Frequency: episodically   Course: gradually improving   Associated symptoms: double vision   Comments: Patient had a double vision episode due to high blood pressure during a MRI 2 months ago. Os eye still seems not right and is a greater change than od eye        Copied from Urgent Care visit 9/14/2023    \"Glen Levin RN 9/14/2023, 12:14 PM     SUBJECTIVE: Lexy Sinha is a 38 y.o. old female here for elevated blood following MRI her head that she had yesterday. She states that when she was done with her MRI she had some blurry and double vision. The staff sat her down and did a blood pressure on her and stated that it was extremely high, they discussed whether she should be seen in the ER however as they were monitoring her her symptoms went away, her blood pressure remained high but they went ahead and sent her home. Patient states that she has been checking her blood pressure on a daily basis and states that it has been running high for quite some time. She was seen in April 2023 and was put on amlodipine however she never picked up the medication, she went to try to  the medication the other day, due to her ongoing home readings being high and the pharmacy stated that they no longer had the prescription. She did not reach out her doctor who she fell to be too long of a wait. To hear back from them. She denies chest pain, shortness of breath, lightheadedness, dizziness and no headache complaint at this time. She states that vision appears to be normal at this time. \"     Her blood pressure is now controlled    Review[facility]/31/[facility]23 [facility] [place] [facility] [place]  [facility] 20[facility] " right eye, 20/20 left eye.    Wears Acuvue Oasys contact lenses 50% of the time      Medical, surgical and family histories reviewed and updated 11/21/2023.       OBJECTIVE: See Ophthalmology exam    ASSESSMENT:    ICD-10-CM    1. Blurred vision  H53.8       2. Primary hypertension  I10       3. Microcystic edema of cornea  H18.20 neomycin-polymyxin-dexAMETHasone (MAXITROL) 3.5-39483-5.1 SUSP ophthalmic susp         PLAN:     Patient Instructions   There is no affects of the high blood pressure affecting the eyes noted on today's eye exam.    There is some swelling of the front of the eyes (cornea) left eye > right eye that may be do to contact lens wear.    Maxitrol- 1 drop both eyes 4 x day for 7 days, 3 x day for 7 days, 2 x day for 14 days until seen.    Non preserved artificial tears- 1 drop both eyes 4 x day.    Return in 1 month for recheck cornea and refraction.  There may be some vision changes as the cornea heals.    [place] Adams, OD

## 2023-11-29 ENCOUNTER — THERAPY VISIT (OUTPATIENT)
Dept: PHYSICAL THERAPY | Facility: CLINIC | Age: 38
End: 2023-11-29
Attending: FAMILY MEDICINE
Payer: COMMERCIAL

## 2023-11-29 DIAGNOSIS — M54.9 UPPER BACK PAIN ON RIGHT SIDE: Primary | ICD-10-CM

## 2023-11-29 PROCEDURE — 97110 THERAPEUTIC EXERCISES: CPT | Mod: GP | Performed by: PHYSICAL THERAPIST

## 2023-11-29 PROCEDURE — 97140 MANUAL THERAPY 1/> REGIONS: CPT | Mod: GP | Performed by: PHYSICAL THERAPIST

## 2023-12-12 DIAGNOSIS — E66.811 CLASS 1 OBESITY WITH SERIOUS COMORBIDITY AND BODY MASS INDEX (BMI) OF 30.0 TO 30.9 IN ADULT, UNSPECIFIED OBESITY TYPE: ICD-10-CM

## 2023-12-12 RX ORDER — SEMAGLUTIDE 0.68 MG/ML
INJECTION, SOLUTION SUBCUTANEOUS
Qty: 3 ML | Refills: 0 | Status: SHIPPED | OUTPATIENT
Start: 2023-12-12 | End: 2024-01-17

## 2023-12-21 ENCOUNTER — OFFICE VISIT (OUTPATIENT)
Dept: OPTOMETRY | Facility: CLINIC | Age: 38
End: 2023-12-21
Payer: COMMERCIAL

## 2023-12-21 DIAGNOSIS — H18.20: Primary | ICD-10-CM

## 2023-12-21 PROCEDURE — 99213 OFFICE O/P EST LOW 20 MIN: CPT | Performed by: OPTOMETRIST

## 2023-12-21 ASSESSMENT — EXTERNAL EXAM - RIGHT EYE: OD_EXAM: NORMAL

## 2023-12-21 ASSESSMENT — REFRACTION_MANIFEST
OS_AXIS: 015
OD_SPHERE: -4.25
OD_CYLINDER: +0.75
OS_CYLINDER: +0.75
OD_AXIS: 020
OS_SPHERE: -4.25

## 2023-12-21 ASSESSMENT — REFRACTION_WEARINGRX
OS_CYLINDER: +0.75
OS_SPHERE: -4.25
OD_CYLINDER: +0.75
OS_AXIS: 015
OD_AXIS: 020
OD_SPHERE: -4.25

## 2023-12-21 ASSESSMENT — VISUAL ACUITY
OS_CC: 20/25
OS_CC+: -1
OD_CC: 20/20
METHOD: SNELLEN - LINEAR
CORRECTION_TYPE: GLASSES

## 2023-12-21 ASSESSMENT — EXTERNAL EXAM - LEFT EYE: OS_EXAM: NORMAL

## 2023-12-21 NOTE — PROGRESS NOTES
Red Eye Recheck    Reason:   Chief Complaint   Patient presents with    Corneal Edema Follow Up     Also refraction    Eyes feel: better    Medications used: maxitrol     How often: therapy completed    Last eye exam- elsewhere- 5/2023.  Glasses and contact lenses are from that exam.  Refraction was off 11/21/203 due to corneal edema.      Sonia Castellanos, Optometric Assistant, A.B.O.C.     OBJECTIVE:     See ophthalmology exam      ASSESSMENT:        ICD-10-CM    1. Microcystic edema of cornea  H18.20     resolved             PLAN:       Patient Instructions   The corneal swelling has resolved.    Ok to ease into contact lens wear.  Ok to return for cl fit for 1 use contact lenses.    Recommend annual eye exams.    [place] Adams, OD

## 2023-12-21 NOTE — LETTER
12/21/2023         RE: eLxy Sinha  [address]  [facility] [address]        Dear Colleague,    Thank you for referring your patient, Lexy Sinha, to the [facility]. Please see a copy of my visit note below.    Red Eye Recheck    Reason:   Chief Complaint   Patient presents with     Corneal Edema Follow Up     Also refraction    Eyes feel: better    Medications used: maxitrol     How often: therapy completed    Last eye exam- elsewhere- 5/2023.  Glasses and contact lenses are from that exam.  Refraction was off 11/21/203 due to corneal edema.      Sonia Castellanos, Optometric Assistant, A.B.O.C.     OBJECTIVE:     See ophthalmology exam      ASSESSMENT:        ICD-10-CM    1. Microcystic edema of cornea  H18.20     resolved             PLAN:       Patient Instructions   The corneal swelling has resolved.    Ok to ease into contact lens wear.  Ok to return for cl fit for 1 use contact lenses.    Recommend annual eye exams.    [place] Adams, YUE               Again, thank you for allowing me to participate in the care of your patient.        Sincerely,        [place] Adams, OD   +body aches/FEVER/CHILLS

## 2023-12-21 NOTE — PATIENT INSTRUCTIONS
The corneal swelling has resolved.    Ok to ease into contact lens wear.  Ok to return for cl fit for 1 use contact lenses.    Recommend annual eye exams.    Julio Adams, OD    
Javier

## 2024-01-02 ENCOUNTER — OFFICE VISIT (OUTPATIENT)
Dept: OBGYN | Facility: CLINIC | Age: 39
End: 2024-01-02
Payer: COMMERCIAL

## 2024-01-02 VITALS
OXYGEN SATURATION: 100 % | BODY MASS INDEX: 26.66 KG/M2 | WEIGHT: 160 LBS | HEIGHT: 65 IN | DIASTOLIC BLOOD PRESSURE: 84 MMHG | HEART RATE: 71 BPM | SYSTOLIC BLOOD PRESSURE: 130 MMHG

## 2024-01-02 DIAGNOSIS — N94.6 DYSMENORRHEA: ICD-10-CM

## 2024-01-02 DIAGNOSIS — Z12.4 SCREENING FOR MALIGNANT NEOPLASM OF CERVIX: Primary | ICD-10-CM

## 2024-01-02 DIAGNOSIS — N94.2 VAGINISMUS: ICD-10-CM

## 2024-01-02 PROCEDURE — 99204 OFFICE O/P NEW MOD 45 MIN: CPT | Performed by: OBSTETRICS & GYNECOLOGY

## 2024-01-02 PROCEDURE — G0145 SCR C/V CYTO,THINLAYER,RESCR: HCPCS | Performed by: OBSTETRICS & GYNECOLOGY

## 2024-01-02 PROCEDURE — 87624 HPV HI-RISK TYP POOLED RSLT: CPT | Performed by: OBSTETRICS & GYNECOLOGY

## 2024-01-02 RX ORDER — KETOROLAC TROMETHAMINE 10 MG/1
10 TABLET, FILM COATED ORAL EVERY 6 HOURS PRN
Qty: 20 TABLET | Refills: 0 | Status: SHIPPED | OUTPATIENT
Start: 2024-01-02 | End: 2024-03-06

## 2024-01-02 RX ORDER — TIZANIDINE 2 MG/1
2 TABLET ORAL 3 TIMES DAILY
Qty: 30 TABLET | Refills: 3 | Status: SHIPPED | OUTPATIENT
Start: 2024-01-02

## 2024-01-02 NOTE — PROGRESS NOTES
GYN Clinic Visit  Date of visit: 2024  Chief Complaint: Pap, dysmenorrhea.    HPI:   Lexy Sinha is a 38 year old  female who presents for cervical cancer screening.  She would also like to discuss dysmenorrhea and vaginismus    Menstrual cramps are bad  Takes a lot of aleve - 4 pills every 6h at the worst  Was on OCPs in college but did not like being on it, did not feel like herself  Has tried many hormonal meds and IUD in the past but is not interested in trying any of those again  Cramping starts mild the day before  Then cramping gets worse once flow starts and becomes debilitating for 1-2 days  Wants med that will help with cramps  Has to sit in the bath for hours  Loses 1-2 days per cycle  Can't travel for work during menses bc of pain  Feels like it is getting worse or aleve is not working as well  Menses are regular, 3d of flow but 4-5d total of bleeding. Flow is moderate. Changes protection 1-2 x/ day.  Patient's last menstrual period was 10/30/2023 (exact date).   Vaginismus  H/o partial hymenectomy  Did pelvic floor PT, interested in going back  Dyspareunia with initial penetration and deep        Obstetric History:   OB History    Para Term  AB Living   0 0 0 0 0 0   SAB IAB Ectopic Multiple Live Births   0 0 0 0 0       Past Medical History:  Past Medical History:   Diagnosis Date    Acne     Hypertension        Past Surgical History:  Past Surgical History:   Procedure Laterality Date    PARTIAL HYMENECTOMY         Medications:  Current Outpatient Medications   Medication    clindamycin (CLEOCIN T) 1 % external solution    fexofenadine (ALLEGRA) 180 MG tablet    semaglutide (OZEMPIC, 0.25 OR 0.5 MG/DOSE,) 2 MG/3ML pen    spironolactone (ALDACTONE) 25 MG tablet     No current facility-administered medications for this visit.       Allergy:  Allergies   Allergen Reactions    Doxycycline Rash    Nickel Rash     Patient denies food, latex or environmental allergies.  "    Social History:  Works as a content creator for [employer]  Social History     Tobacco Use    Smoking status: Never    Smokeless tobacco: Never   Vaping Use    Vaping Use: Never used   Substance Use Topics    Alcohol use: Yes     Comment: occ    Drug use: No        Physical Exam:  Vitals:    24 1505   BP: 130/84   Pulse: 71   SpO2: 100%   Weight: 72.6 kg (160 lb)   Height: 1.65 m (5' 4.96\")     Body mass index is 26.66 kg/m .    Gen: healthy, alert, active, hyperventilating during pelvic exam  Abd: soft, non-tender, non-distended, no masses  Extremities: nontender, no edema  :   - external genitalia: vulva and perineum are normal without lesion, mass or erythema  - urethra: well supported urethra, no hypermobility, normal Skenes and Bartholins.   - bladder: no tenderness, no masses  - vagina: narrow introitus, very high resting tone of levators with tenderness bilaterally, intact, rugated mucosa without lesions or abnormal discharge.   - cervix: narrow yara used, difficult to full visualize cervix due to patient discomfort. Nulliparous, normal, no lesions or abnormal discharge. Pap smear blindly obtained  - uterus: 6w size anteverted, no masses or tenderness  - adnexa: no masses or tenderness  - rectal: deferred      Assessment:  Lexy Sinha is a 38 year old  who presents for cervical cancer screening.     Plan:  1. Screening for malignant neoplasm of cervix  Cervical Cancer Screening: A pap smear has been collected today, will perform cytology and HPV testing as patient is >30 years old.  If negative cytology and negative high risk HPV, plan to screen with co-testing every 5 years per ASCCP guidelines.  The patient will be notified by phone if there are any abnormal results and follow up arranged in accordance with ASCCP guidelines.    - Pap screen with HPV - recommended age 30 - 65 years    2. Vaginismus    - Physical Therapy Referral; Future    3. Dysmenorrhea  Suspect " endometriosis  Discussed hormonal medications -not interested, has already tried several including IUD  Discussed Gn RH agonist and antagonist. Discussed these can impact mood  Discussed surgery options - diagnostic laparoscopy, hysterectomy, hysterectomy plus BSO  Discussed nonhormonal medical management. She wants to try a different NSAID and muscle relaxers  Recommend she stop aleve while taking toradol  Recommend she send a message after next menses to let me know if these meds helped  - ketorolac (TORADOL) 10 MG tablet; Take 1 tablet (10 mg) by mouth every 6 hours as needed for moderate pain  Dispense: 20 tablet; Refill: 0  - tiZANidine (ZANAFLEX) 2 MG tablet; Take 1 tablet (2 mg) by mouth 3 times daily  Dispense: 30 tablet; Refill: 3    Ivette Valenzuela MD

## 2024-01-04 LAB
BKR LAB AP GYN ADEQUACY: NORMAL
BKR LAB AP GYN INTERPRETATION: NORMAL
BKR LAB AP HPV REFLEX: NORMAL
BKR LAB AP LMP: NORMAL
BKR LAB AP PREVIOUS ABNORMAL: NORMAL
PATH REPORT.COMMENTS IMP SPEC: NORMAL
PATH REPORT.COMMENTS IMP SPEC: NORMAL
PATH REPORT.RELEVANT HX SPEC: NORMAL

## 2024-01-05 ENCOUNTER — THERAPY VISIT (OUTPATIENT)
Dept: PHYSICAL THERAPY | Facility: CLINIC | Age: 39
End: 2024-01-05
Payer: COMMERCIAL

## 2024-01-05 DIAGNOSIS — M54.9 UPPER BACK PAIN ON RIGHT SIDE: Primary | ICD-10-CM

## 2024-01-05 PROCEDURE — 97140 MANUAL THERAPY 1/> REGIONS: CPT | Mod: GP | Performed by: PHYSICAL THERAPIST

## 2024-01-05 PROCEDURE — 97110 THERAPEUTIC EXERCISES: CPT | Mod: GP | Performed by: PHYSICAL THERAPIST

## 2024-01-08 LAB
HUMAN PAPILLOMA VIRUS 16 DNA: NEGATIVE
HUMAN PAPILLOMA VIRUS 18 DNA: NEGATIVE
HUMAN PAPILLOMA VIRUS FINAL DIAGNOSIS: NORMAL
HUMAN PAPILLOMA VIRUS OTHER HR: NEGATIVE

## 2024-01-16 DIAGNOSIS — E66.811 CLASS 1 OBESITY WITH SERIOUS COMORBIDITY AND BODY MASS INDEX (BMI) OF 30.0 TO 30.9 IN ADULT, UNSPECIFIED OBESITY TYPE: ICD-10-CM

## 2024-01-17 ENCOUNTER — MYC MEDICAL ADVICE (OUTPATIENT)
Dept: FAMILY MEDICINE | Facility: CLINIC | Age: 39
End: 2024-01-17
Payer: COMMERCIAL

## 2024-01-17 RX ORDER — SEMAGLUTIDE 0.68 MG/ML
INJECTION, SOLUTION SUBCUTANEOUS
Qty: 6 ML | Refills: 0 | Status: SHIPPED | OUTPATIENT
Start: 2024-01-17 | End: 2024-02-14 | Stop reason: DRUGHIGH

## 2024-01-18 ENCOUNTER — ANCILLARY PROCEDURE (OUTPATIENT)
Dept: GENERAL RADIOLOGY | Facility: CLINIC | Age: 39
End: 2024-01-18
Attending: STUDENT IN AN ORGANIZED HEALTH CARE EDUCATION/TRAINING PROGRAM
Payer: COMMERCIAL

## 2024-01-18 ENCOUNTER — OFFICE VISIT (OUTPATIENT)
Dept: URGENT CARE | Facility: URGENT CARE | Age: 39
End: 2024-01-18
Payer: COMMERCIAL

## 2024-01-18 VITALS
TEMPERATURE: 97.3 F | RESPIRATION RATE: 16 BRPM | DIASTOLIC BLOOD PRESSURE: 81 MMHG | HEART RATE: 72 BPM | BODY MASS INDEX: 26.32 KG/M2 | WEIGHT: 158 LBS | OXYGEN SATURATION: 98 % | SYSTOLIC BLOOD PRESSURE: 115 MMHG

## 2024-01-18 DIAGNOSIS — R10.9 FLANK PAIN: ICD-10-CM

## 2024-01-18 DIAGNOSIS — N20.0 NEPHROLITHIASIS: Primary | ICD-10-CM

## 2024-01-18 LAB
ALBUMIN UR-MCNC: NEGATIVE MG/DL
APPEARANCE UR: CLEAR
BACTERIA #/AREA URNS HPF: ABNORMAL /HPF
BILIRUB UR QL STRIP: NEGATIVE
COLOR UR AUTO: YELLOW
GLUCOSE UR STRIP-MCNC: NEGATIVE MG/DL
HGB UR QL STRIP: ABNORMAL
HYALINE CASTS #/AREA URNS LPF: ABNORMAL /LPF
KETONES UR STRIP-MCNC: 40 MG/DL
LEUKOCYTE ESTERASE UR QL STRIP: NEGATIVE
MUCOUS THREADS #/AREA URNS LPF: PRESENT /LPF
NITRATE UR QL: NEGATIVE
PH UR STRIP: 5.5 [PH] (ref 5–7)
RBC #/AREA URNS AUTO: ABNORMAL /HPF
SP GR UR STRIP: >=1.03 (ref 1–1.03)
SQUAMOUS #/AREA URNS AUTO: ABNORMAL /LPF
UROBILINOGEN UR STRIP-ACNC: 0.2 E.U./DL
WBC #/AREA URNS AUTO: ABNORMAL /HPF

## 2024-01-18 PROCEDURE — 99214 OFFICE O/P EST MOD 30 MIN: CPT | Performed by: STUDENT IN AN ORGANIZED HEALTH CARE EDUCATION/TRAINING PROGRAM

## 2024-01-18 PROCEDURE — 81001 URINALYSIS AUTO W/SCOPE: CPT | Performed by: STUDENT IN AN ORGANIZED HEALTH CARE EDUCATION/TRAINING PROGRAM

## 2024-01-18 PROCEDURE — 74019 RADEX ABDOMEN 2 VIEWS: CPT | Mod: TC | Performed by: RADIOLOGY

## 2024-01-18 RX ORDER — TAMSULOSIN HYDROCHLORIDE 0.4 MG/1
0.4 CAPSULE ORAL DAILY
Qty: 14 CAPSULE | Refills: 0 | Status: SHIPPED | OUTPATIENT
Start: 2024-01-18 | End: 2024-05-16

## 2024-01-18 NOTE — PROGRESS NOTES
ASSESSMENT & PLAN:   Diagnoses and all orders for this visit:  Nephrolithiasis  -     tamsulosin (FLOMAX) 0.4 MG capsule; Take 1 capsule (0.4 mg) by mouth daily  Flank pain  -     UA Macroscopic with reflex to Microscopic and Culture - Clinic Collect  -     UA Microscopic with Reflex to Culture  -     XR Abdomen 2 Views; Future    Urinary urgency x 2 days. History of kidney stones. UA shows hematuria with no signs of infection. CT scan not available at [facility]. Abdominal XR done to check for large stone - no visible stone. Will treat for presumptive stone with Flomax (until symptoms resolve), tylenol/ibuprofen, push fluids. ER if severe pain.     At the end of the encounter, I discussed results, diagnosis, medications. Discussed red flags for immediate return to clinic/ER, as well as indications for follow up if no improvement. Patient and/or caregiver understood and agreed to plan. Patient was stable for discharge.    There are no Patient Instructions on file for this visit.    ------------------------------------------------------------------------  SUBJECTIVE  History was obtained from patient.    Patient presents with:  Urgent Care: C/o kidney stone or UTI x2 days.   UTI    HPI  Lexy Sinha is a(n) 38 year old female presenting to urgent care for urinary urgency x 2 days. Had left flank pain initially. No dysuria, fever, chills, nausea, vomiting. History of kidney stone - has had 2 in the past, most recently 4 years ago.     Review of Systems    Current Outpatient Medications   Medication Sig Dispense Refill    ketorolac (TORADOL) 10 MG tablet Take 1 tablet (10 mg) by mouth every 6 hours as needed for moderate pain 20 tablet 0    semaglutide (OZEMPIC, 0.25 OR 0.5 MG/DOSE,) 2 MG/3ML pen INJECT 0.5 MG SUBCUTANEOUS EVERY 7 DAYS FOR 30 DAYS 6 mL 0    spironolactone (ALDACTONE) 25 MG tablet Take 1 tablet (25 mg) by mouth 2 times daily for 360 days 180 tablet 3    tamsulosin (FLOMAX) 0.4 MG  capsule Take 1 capsule (0.4 mg) by mouth daily 14 capsule 0    clindamycin (CLEOCIN T) 1 % external solution Apply topically 2 times daily 60 mL 1    fexofenadine (ALLEGRA) 180 MG tablet Take 1 tablet (180 mg) by mouth 2 times daily 60 tablet 3    tiZANidine (ZANAFLEX) 2 MG tablet Take 1 tablet (2 mg) by mouth 3 times daily 30 tablet 3     Problem List:  2023-07: Achilles tendinitis of both lower extremities  2020-11: Ureteral stone  2016-02: Nephrolithiasis  2012-11: Dysmenorrhea  2012-11: Vaginismus    Allergies   Allergen Reactions    Doxycycline Rash    Nickel Rash         OBJECTIVE  Vitals:    01/18/24 1251   BP: 115/81   Pulse: 72   Resp: 16   Temp: 97.3  F (36.3  C)   TempSrc: Temporal   SpO2: 98%   Weight: 71.7 kg (158 lb)     Physical Exam   GENERAL: healthy, alert, no acute distress.   PSYCH: mentation appears normal. Normal affect  ABDOMEN: soft, nondistended, nontender. No guarding or rebound tenderness. No CVA tenderness bilaterally.    Xrays were preliminarily reviewed by me - no visible stone.     Results for orders placed or performed in visit on 01/18/24   XR Abdomen 2 Views     Status: None    Narrative    XR ABDOMEN 2 VIEWS   1/18/2024 1:44 PM     HISTORY: suspected kidney stone, L flank pain; Flank pain    COMPARISON: A CT of the abdomen and pelvis on 2/17/2016      Impression    IMPRESSION: No free air. Nonobstructive bowel gas pattern. No  radiographically evident renal calculi or calculi along the course of  the ureters.    MARJORIE YADAV MD         SYSTEM ID:  [id number]   Results for orders placed or performed in visit on 01/18/24   UA Macroscopic with reflex to Microscopic and Culture - Clinic Collect     Status: Abnormal    Specimen: Urine, Midstream   Result Value Ref Range    Color Urine Yellow Colorless, Straw, Light Yellow, Yellow    Appearance Urine Clear Clear    Glucose Urine Negative Negative mg/dL    Bilirubin Urine Negative Negative    Ketones Urine 40 (A) Negative mg/dL    Specific  Gravity Urine >=1.030 1.003 - 1.035    Blood Urine Moderate (A) Negative    pH Urine 5.5 5.0 - 7.0    Protein Albumin Urine Negative Negative mg/dL    Urobilinogen Urine 0.2 0.2, 1.0 E.U./dL    Nitrite Urine Negative Negative    Leukocyte Esterase Urine Negative Negative   UA Microscopic with Reflex to Culture     Status: Abnormal   Result Value Ref Range    Bacteria Urine Few (A) None Seen /HPF    RBC Urine 10-25 (A) 0-2 /HPF /HPF    WBC Urine 0-5 0-5 /HPF /HPF    Squamous Epithelials Urine Moderate (A) None Seen /LPF    Mucus Urine Present (A) None Seen /LPF    Hyaline Casts Urine 2-5 (A) None Seen /LPF    Narrative    Urine Culture not indicated

## 2024-01-20 ENCOUNTER — HOSPITAL ENCOUNTER (EMERGENCY)
Facility: CLINIC | Age: 39
Discharge: HOME | End: 2024-01-20
Attending: EMERGENCY MEDICINE | Admitting: EMERGENCY MEDICINE
Payer: COMMERCIAL

## 2024-01-20 VITALS
OXYGEN SATURATION: 98 % | SYSTOLIC BLOOD PRESSURE: 136 MMHG | TEMPERATURE: 98.9 F | HEIGHT: 64 IN | WEIGHT: 158 LBS | BODY MASS INDEX: 26.98 KG/M2 | HEART RATE: 108 BPM | DIASTOLIC BLOOD PRESSURE: 87 MMHG | RESPIRATION RATE: 16 BRPM

## 2024-01-20 DIAGNOSIS — R39.15 URINARY URGENCY: ICD-10-CM

## 2024-01-20 DIAGNOSIS — R31.29 MICROSCOPIC HEMATURIA: ICD-10-CM

## 2024-01-20 LAB
ALBUMIN UR-MCNC: NEGATIVE MG/DL
APPEARANCE UR: CLEAR
BACTERIA #/AREA URNS HPF: ABNORMAL /HPF
BILIRUB UR QL STRIP: ABNORMAL
COLOR UR AUTO: ABNORMAL
GLUCOSE UR STRIP-MCNC: NEGATIVE MG/DL
HCG UR QL: NEGATIVE
HGB UR QL STRIP: NEGATIVE
KETONES UR STRIP-MCNC: NEGATIVE MG/DL
LEUKOCYTE ESTERASE UR QL STRIP: NEGATIVE
NITRATE UR QL: POSITIVE
PH UR STRIP: 6 [PH] (ref 5–7)
RBC URINE: 4 /HPF
SP GR UR STRIP: 1.01 (ref 1–1.03)
SQUAMOUS EPITHELIAL: 2 /HPF
UROBILINOGEN UR STRIP-MCNC: 2 MG/DL
WBC URINE: 1 /HPF

## 2024-01-20 PROCEDURE — 81025 URINE PREGNANCY TEST: CPT | Performed by: EMERGENCY MEDICINE

## 2024-01-20 PROCEDURE — 99283 EMERGENCY DEPT VISIT LOW MDM: CPT

## 2024-01-20 PROCEDURE — 81001 URINALYSIS AUTO W/SCOPE: CPT | Performed by: EMERGENCY MEDICINE

## 2024-01-20 PROCEDURE — 87086 URINE CULTURE/COLONY COUNT: CPT | Performed by: EMERGENCY MEDICINE

## 2024-01-20 RX ORDER — CEPHALEXIN 500 MG/1
500 CAPSULE ORAL 2 TIMES DAILY
Qty: 10 CAPSULE | Refills: 0 | Status: SHIPPED | OUTPATIENT
Start: 2024-01-20 | End: 2024-01-25

## 2024-01-21 NOTE — ED PROVIDER NOTES
"  History     Chief Complaint:  Hematuria     The history is provided by the patient.      Lexy Sinha is a 38 year old female with a history of hypertension and nephrolithiasis who presents to the emergency department for hematuria. The patient states that tonight, she began experiencing hematuria, urinary urgency, and \"bladder pain.\" She reports that she has had 2 kidney stones in the past but has not followed up with urology recently, due to switching healthy systems.She notes that today, she took AZO as well as increased her fluid consumption. She is also on Flomax. Denies back pain or flank pain. Denies chance of pregnancy and still has her uterus.     Independent Historian:   None - Patient Only    Review of External Notes:   Reviewed urgent care visit notes from January 18, 2024.  Patient had urinary urgency.  Microscopic hematuria was seen on urinalysis and given her history of kidney stones she was treated as a presumptive kidney stone.  No antibiotics were given in discharge.    Medications:    Fexofenadine  Ketorolac  Spironolactone  Tamsulosin  Tizanidine    Past Medical History:    Acne   Hypertension  Nephrolithiasis  Dysmenorrhea    Past Surgical History:    Partial hymenectomy     Physical Exam   Patient Vitals for the past 24 hrs:   BP Temp Temp src Pulse Resp SpO2 Height Weight   01/20/24 2032 -- -- -- -- -- -- -- 71.7 kg (158 lb)   01/20/24 2030 136/87 98.9  F (37.2  C) Temporal 108 16 98 % 1.626 m (5' 4\") 71.7 kg (158 lb)      Physical Exam  General: Well-nourished, appears to be resting comfortably when I enter the room  Eyes: PERRL, conjunctivae pink no scleral icterus or conjunctival injection  ENT:  Moist mucus membranes, posterior oropharynx clear without erythema or exudates  Respiratory:  Lungs clear to auscultation bilaterally, no crackles/rubs/wheezes.  Good air movement  CV: Normal rate and rhythm, no murmurs/rubs/gallops  GI:  Abdomen soft and non-distended.  Normoactive BS.  " No tenderness, guarding or rebound  Skin: Warm, dry.  No rashes or petechiae  Musculoskeletal: No peripheral edema or calf tenderness  Neuro: Alert and oriented to person/place/time  Psychiatric: Normal affect      Emergency Department Course     Laboratory:  Labs Ordered and Resulted from Time of ED Arrival to Time of ED Departure   ROUTINE UA WITH MICROSCOPIC REFLEX TO CULTURE - Abnormal       Result Value    Color Urine Orange (*)     Appearance Urine Clear      Glucose Urine Negative      Bilirubin Urine Small (*)     Ketones Urine Negative      Specific Gravity Urine 1.011      Blood Urine Negative      pH Urine 6.0      Protein Albumin Urine Negative      Urobilinogen Urine 2.0      Nitrite Urine Positive (*)     Leukocyte Esterase Urine Negative      Bacteria Urine Few (*)     RBC Urine 4 (*)     WBC Urine 1      Squamous Epithelials Urine 2 (*)    HCG QUALITATIVE URINE - Normal    hCG Urine Qualitative Negative     URINE CULTURE      Emergency Department Course & Assessments:    Interventions:  None     Assessments:  2205 I obtained history and examined the patient as noted above.     Independent Interpretation (X-rays, CTs, rhythm strip):  None    Consultations/Discussion of Management or Tests:  None     Social Determinants of Health affecting care:   None    Disposition:  The patient was discharged to home.     Impression & Plan      Medical Decision Making:  Lexy Sinha is a 38 year old female who comes today with urinary urgency and what she describes as gross hematuria.  She did take Azo and I suspect this actually change the color of her urine as she does have just a few red blood cells seen on microscopic urinalysis.  She has nitrates today and given her symptoms of urgency, I do feel that we should treat her for cystitis.  I do not believe this represents ureteric stone given that she does not have flank pain and symptoms do not seem consistent with renal colic.  Her urinary urgency could be  worsened by the Flomax and I told her she could stop taking this medication.  There has been no fever, significant back/flank pain or significant abdominal pain.  There is no clinical evidence of pyelonephritis, appendicitis,  or diverticulitis.  The patient will be started on antibiotics for the infection. The patient is instructed to return if increasing pain, vomiting, fever, or inability to tolerate the oral antibiotic.  Follow up with primary physician is indicated if not improving in 2-3 days.  She requested a referral to urology and is aware that she needs to follow-up regarding the microscopic hematuria if this does not clear with treatment with the antibiotics.  This was made within epic.    Diagnosis:    ICD-10-CM    1. Microscopic hematuria  R31.29 Adult Urology  Referral      2. Urinary urgency  R39.15 Adult Urology  Referral         Discharge Medications:  New Prescriptions    CEPHALEXIN (KEFLEX) 500 MG CAPSULE    Take 1 capsule (500 mg) by mouth 2 times daily for 5 days      Scribe Disclosure:  I, Jeff Stephen, am serving as a scribe at 10:37 PM on 1/20/2024 to document services personally performed by Astrid Bergeron MD based on my observations and the provider's statements to me.     1/20/2024   Astrid Bergeron MD Cho, Amy C, MD  01/21/24 0432

## 2024-01-21 NOTE — ED TRIAGE NOTES
Pt arrives via triage with known kidney stone, dx 4 days ago. Pt reports hematuria began today. Pain 2/10 currently. Pt compliant with flomax.

## 2024-01-21 NOTE — DISCHARGE INSTRUCTIONS
*You may resume diet and activities as tolerated.  *Take medications as prescribed.  Cephalexin as directed.  Phenazopyridine as needed for painful urination.  This will change the color of your urine to red.  Continue your current medications.  *Follow up with your doctor in the next 2-3 days for a recheck.,  *Return if you develop fever, back pain, vomiting, unable to urinate, faint or feel like you will faint or become worse in any way.    Discharge Instructions  Urinary Tract Infection  You or your child have been diagnosed with a urinary tract infection, or UTI. The urinary tract includes the kidneys (which make urine/pee), ureters (the tubes that carry urine/pee from the kidneys to the bladder), the bladder (which stores urine/pee), and urethra (the tube that carries urine/pee out of the bladder). Urinary tract infections occur when bacteria travel up the urethra into the bladder (bladder infection) and, in some cases, from there into the kidneys (kidney infection).  Generally, every Emergency Department visit should have a follow-up clinic visit with either a primary or a specialty clinic/provider. Please follow-up as instructed by your emergency provider today.  Return to the Emergency Department if:  You or your child have severe back pain.  You or your child are vomiting (throwing up) so that you cannot take your medicine.  You or your child have a new fever (had not previously had a fever) over 101 F.  You or your child have confusion or are very weak, or feel very ill.  Your child seems much more ill, will not wake up, will not respond right, or is crying for a long time and will not calm down.  You or your child are showing signs of dehydration. These signs may include decreased urination (pee), dry mouth/gums/tongue, or decreased activity.    Follow-up with your provider:   Children under 24 months need to be seen by their regular provider within one week after a diagnosis of a UTI. It may be necessary  to do some more tests to look at the child s kidney or bladder.  You should begin to feel better within 24 - 48 hours of starting your antibiotic; follow-up with your regular clinic/doctor/provider if this is not the case.    Treatment:   You will be treated with an antibiotic to kill the bacteria. We have to make an educated guess, based on what we know about common bacteria and antibiotics, as to which antibiotic will work for your infection. We will be correct most times but there will be some cases where the antibiotic chosen is not correct (see urine cultures below).  Take a pain medication such as acetaminophen (Tylenol ) or ibuprofen (Advil , Motrin , Nuprin ).  Phenazopyridine (Pyridium , Uristat ) is a prescription medication that numbs the bladder to reduce the burning pain of some UTIs.  The same medication is available in a non-prescription version (Azo-Standard , Urodol ). This medication will change the color of the urine and tears (usually blue or orange). If you wear contacts, do not wear them while taking this medication as they may be stained by the medication.    Urine Cultures:  If indicated, a urine culture may have been performed today. This test generally takes 24-48 hours to complete so the results are not known at this time. The results can confirm that an infection is present but also determine which antibiotic is effective for the specific bacteria that is causing the infection. If your urine culture shows that the antibiotic you were given today will not work to treat your infection, we will attempt to contact you to make arrangements to change the antibiotic. If the culture confirms that the antibiotic is effective for your infection, you will not be contacted. We often recommend follow-up with your regular physician/provider on the culture results regardless of this process.    Antibiotic Warning:   If you have been placed on antibiotics - watch for signs of allergic reaction.  These  "include rash, lip swelling, difficulty breathing, wheezing, and dizziness.  If you develop any of these symptoms, stop the antibiotic immediately and go to an emergency room or urgent care for evaluation.    Probiotics: If you have been given an antibiotic, you may want to also take a probiotic pill or eat yogurt with live cultures. Probiotics have \"good bacteria\" to help your intestines stay healthy. Studies have shown that probiotics help prevent diarrhea and other intestine problems (including C. diff infection) when you take antibiotics. You can buy these without a prescription in the pharmacy section of the store.   If you were given a prescription for medicine here today, be sure to read all of the information (including the package insert) that comes with your prescription.  This will include important information about the medicine, its side effects, and any warnings that you need to know about.  The pharmacist who fills the prescription can provide more information and answer questions you may have about the medicine.  If you have questions or concerns that the pharmacist cannot address, please call or return to the Emergency Department.   Remember that you can always come back to the Emergency Department if you are not able to see your regular provider in the amount of time listed above, if you get any new symptoms, or if there is anything that worries you.    "

## 2024-01-21 NOTE — ED TRIAGE NOTES
Pt reports having a headache start about 1.5 hrs ago, pressure to top of head, after that states SOB and hyperventilating.  Denies any hx of panic attacks.  Pt is 3 mo pregnant.     Triage Assessment (Adult)       Row Name 01/20/24 2033          Triage Assessment    Airway WDL WDL        Respiratory WDL    Respiratory WDL all;rhythm/pattern     Rhythm/Pattern, Respiratory tachypneic        Skin Circulation/Temperature WDL    Skin Circulation/Temperature WDL WDL        Cardiac WDL    Cardiac WDL WDL        Peripheral/Neurovascular WDL    Peripheral Neurovascular WDL WDL        Cognitive/Neuro/Behavioral WDL    Cognitive/Neuro/Behavioral WDL WDL

## 2024-01-22 LAB — BACTERIA UR CULT: NORMAL

## 2024-01-26 ENCOUNTER — OFFICE VISIT (OUTPATIENT)
Dept: UROLOGY | Facility: CLINIC | Age: 39
End: 2024-01-26
Attending: EMERGENCY MEDICINE
Payer: COMMERCIAL

## 2024-01-26 VITALS
DIASTOLIC BLOOD PRESSURE: 73 MMHG | WEIGHT: 158 LBS | BODY MASS INDEX: 26.98 KG/M2 | HEART RATE: 78 BPM | TEMPERATURE: 98.6 F | SYSTOLIC BLOOD PRESSURE: 108 MMHG | HEIGHT: 64 IN | OXYGEN SATURATION: 98 %

## 2024-01-26 DIAGNOSIS — R39.15 URINARY URGENCY: ICD-10-CM

## 2024-01-26 DIAGNOSIS — R31.29 MICROSCOPIC HEMATURIA: ICD-10-CM

## 2024-01-26 PROCEDURE — 99204 OFFICE O/P NEW MOD 45 MIN: CPT | Performed by: STUDENT IN AN ORGANIZED HEALTH CARE EDUCATION/TRAINING PROGRAM

## 2024-01-26 RX ORDER — SOLIFENACIN SUCCINATE 5 MG/1
5 TABLET, FILM COATED ORAL EVERY EVENING
Qty: 30 TABLET | Refills: 1 | Status: SHIPPED | OUTPATIENT
Start: 2024-01-26 | End: 2024-05-16

## 2024-01-26 ASSESSMENT — PAIN SCALES - GENERAL: PAINLEVEL: MILD PAIN (2)

## 2024-01-26 NOTE — NURSING NOTE
"Initial /73   Pulse 78   Temp 98.6  F (37  C) (Tympanic)   Ht 1.626 m (5' 4\")   Wt 71.7 kg (158 lb)   LMP  (LMP Unknown)   SpO2 98%   BMI 27.12 kg/m   Estimated body mass index is 27.12 kg/m  as calculated from the following:    Height as of this encounter: 1.626 m (5' 4\").    Weight as of this encounter: 71.7 kg (158 lb). .  Tigist CRABTREE CMA 01/26/24 11:03 AM  "

## 2024-01-26 NOTE — PROGRESS NOTES
"        Chief Complaint:   Microscopic hematuria         History of Present Illness:   Lexy Sinha is a 38 year old female with a history of nephrolithiasis who presents for evaluation of microscopic hematuria.     The patient did see flecks of blood in her urine on 1/18/2024. She reports ongoing urinary urgency and dysuria. She has been straining her urine in case she has a kidney stone, but has not seen a stone pass.     She has a history of kidney stones that have passed without intervention.     She is a never smoker.          Past Medical History:     Past Medical History:   Diagnosis Date    Acne     Hypertension             Past Surgical History:     Past Surgical History:   Procedure Laterality Date    PARTIAL HYMENECTOMY              Medications     Current Outpatient Medications   Medication    tamsulosin (FLOMAX) 0.4 MG capsule    clindamycin (CLEOCIN T) 1 % external solution    fexofenadine (ALLEGRA) 180 MG tablet    ketorolac (TORADOL) 10 MG tablet    semaglutide (OZEMPIC, 0.25 OR 0.5 MG/DOSE,) 2 MG/3ML pen    spironolactone (ALDACTONE) 25 MG tablet    tiZANidine (ZANAFLEX) 2 MG tablet     No current facility-administered medications for this visit.            Allergies:   Doxycycline and Nickel         Review of Systems:  From intake questionnaire   Negative 14 system review except as noted on HPI, nurse's note.         Physical Exam:   Patient is a 38 year old  female   Vitals: Blood pressure 108/73, pulse 78, temperature 98.6  F (37  C), temperature source Tympanic, height 1.626 m (5' 4\"), weight 71.7 kg (158 lb), SpO2 98%, not currently breastfeeding.  General Appearance Adult: Alert, no acute distress, oriented.  Lungs: Non-labored breathing.  Heart: No obvious jugular venous distension present.  Neuro: Alert, oriented, speech and mentation normal      Labs and Pathology:    I personally reviewed all applicable laboratory data and went over findings with patient  Significant for:    CBC " RESULTS:  Recent Labs   Lab Test 02/17/16  0904   WBC 4.6   HGB 14.9           BMP RESULTS:  Recent Labs   Lab Test 11/15/23  1128 09/15/23  1005 02/18/16  0656 02/17/16  0904    136 145* 140   POTASSIUM 4.7 4.7 3.4 3.5   CHLORIDE 107 105 115* 106   CO2 22 20* 22 29   ANIONGAP 9 11 8 5   GLC 87 80 86 138*   BUN 12.1 17.6 12 16   CR 0.93 0.91 0.68 0.76   GFRESTIMATED 80 82 >90  Non  GFR Calc   89   GFRESTBLACK  --   --  >90   GFR Calc   >90   GFR Calc     WILIAM 9.5 9.1 7.4* 8.5       UA RESULTS:   Recent Labs   Lab Test 01/20/24  2200 01/18/24  1246 02/17/16  1145   SG 1.011 >=1.030 1.011   URINEPH 6.0 5.5 7.5*   NITRITE Positive* Negative Negative   RBCU 4* 10-25* 52*   WBCU 1 0-5 6*            Assessment and Plan:     Assessment: Ms. Lexy Sinha is a pleasant 38 year old female with microscopic hematuria. She has a history of kidney stones and is reporting dysuria and urinary urgency. The differential diagnosis at this point includes stone disease, infection, vaginal contaminant, urothelial malignancy, renal disorder versus another yet unknown diagnosis.    Given her history, a ureteral stone is high on the differential. She is scheduled for a CT urogram for further evaluation. She will try solifenacin 5 mg daily in the meantime to reduce urgency.     Plan:  Start solifenacin 5 mg every evening.   CT urogram to evaluate for kidney stones or other cause for microscopic hematuria. If no stones are present, will proceed with cystoscopy.       NATALIO PACHECO PA-C  Department of Urology

## 2024-02-07 DIAGNOSIS — E66.811 CLASS 1 OBESITY WITH SERIOUS COMORBIDITY AND BODY MASS INDEX (BMI) OF 30.0 TO 30.9 IN ADULT, UNSPECIFIED OBESITY TYPE: ICD-10-CM

## 2024-02-07 RX ORDER — SEMAGLUTIDE 1.34 MG/ML
INJECTION, SOLUTION SUBCUTANEOUS
Refills: 0 | OUTPATIENT
Start: 2024-02-07

## 2024-02-07 NOTE — TELEPHONE ENCOUNTER
Patient returns call. Writer relay RN/provider's message below. Caller verbalizes understanding. Pt stated she's only asking for a higher dose and is aware that her insurance won't cover it. Please advise

## 2024-02-07 NOTE — TELEPHONE ENCOUNTER
Rosetta - I got the message from the pharmacy. I already sent a prescription for the higher dose yesterday for the 1 mg for 90 days. Please have her disregard the weight management referral.  DM

## 2024-02-07 NOTE — TELEPHONE ENCOUNTER
TC - can you please inform pt that ozempic is no longer covered under her insurance for weight loss. PA is only if she has diabetes. I have referred her to weight loss clinic to discuss other medication options.  DM

## 2024-02-12 ENCOUNTER — IMMUNIZATION (OUTPATIENT)
Dept: FAMILY MEDICINE | Facility: CLINIC | Age: 39
End: 2024-02-12
Payer: COMMERCIAL

## 2024-02-12 DIAGNOSIS — Z23 ENCOUNTER FOR IMMUNIZATION: Primary | ICD-10-CM

## 2024-02-12 PROCEDURE — 99207 PR NO CHARGE NURSE ONLY: CPT

## 2024-02-12 PROCEDURE — 91320 SARSCV2 VAC 30MCG TRS-SUC IM: CPT

## 2024-02-12 PROCEDURE — 90480 ADMN SARSCOV2 VAC 1/ONLY CMP: CPT

## 2024-02-12 NOTE — PROGRESS NOTES
Prior to immunization administration, verified patients identity using patient s name and date of birth. Please see Immunization Activity for additional information.     Screening Questionnaire for Adult Immunization    Are you sick today?   No   Do you have allergies to medications, food, a vaccine component or latex?   No   Have you ever had a serious reaction after receiving a vaccination?   No   Do you have a long-term health problem with heart, lung, kidney, or metabolic disease (e.g., diabetes), asthma, a blood disorder, no spleen, complement component deficiency, a cochlear implant, or a spinal fluid leak?  Are you on long-term aspirin therapy?   No   Do you have cancer, leukemia, HIV/AIDS, or any other immune system problem?   No   Do you have a parent, brother, or sister with an immune system problem?   No   In the past 3 months, have you taken medications that affect  your immune system, such as prednisone, other steroids, or anticancer drugs; drugs for the treatment of rheumatoid arthritis, Crohn s disease, or psoriasis; or have you had radiation treatments?   No   Have you had a seizure, or a brain or other nervous system problem?   No   During the past year, have you received a transfusion of blood or blood    products, or been given immune (gamma) globulin or antiviral drug?   No   For women: Are you pregnant or is there a chance you could become       pregnant during the next month?   No   Have you received any vaccinations in the past 4 weeks?   No     Immunization questionnaire answers were all negative.    I have reviewed the following standing orders:   This patient is due and qualifies for the Covid-19 vaccine.     Click here for COVID-19 Standing Order    I have reviewed the vaccines inclusion and exclusion criteria; No concerns regarding eligibility.     Patient instructed to remain in clinic for 15 minutes afterwards, and to report any adverse reactions.     Screening performed by Sammi  OMARI Palmer on 2/12/2024 at 3:18 PM.

## 2024-02-14 ENCOUNTER — TELEPHONE (OUTPATIENT)
Dept: FAMILY MEDICINE | Facility: CLINIC | Age: 39
End: 2024-02-14
Payer: COMMERCIAL

## 2024-02-14 DIAGNOSIS — E66.811 CLASS 1 OBESITY WITH SERIOUS COMORBIDITY AND BODY MASS INDEX (BMI) OF 30.0 TO 30.9 IN ADULT, UNSPECIFIED OBESITY TYPE: ICD-10-CM

## 2024-02-15 ENCOUNTER — TELEPHONE (OUTPATIENT)
Dept: FAMILY MEDICINE | Facility: CLINIC | Age: 39
End: 2024-02-15
Payer: COMMERCIAL

## 2024-02-15 RX ORDER — SEMAGLUTIDE 0.68 MG/ML
INJECTION, SOLUTION SUBCUTANEOUS
Refills: 1 | OUTPATIENT
Start: 2024-02-15

## 2024-02-15 NOTE — TELEPHONE ENCOUNTER
Retail Pharmacy Prior Authorization Team   Phone: [phone]      Note: Due to record-high volumes, our turn-around is time taking longer than normal . We are currently 10 days behind in the pools.   We are working diligently to submit all requests in a timely manner and in the order they are received. Please only flag TRUE URGENT requests as high priority to the pool at this time.   If you have questions - please send a note/message in the active PA encounter and send back to the RPPA (Retail Pharmacy Prior Authorization) team [[id number]].    If you have more specific questions about our process please reach out to our supervisor Jane Burleson.   Thank you!     A few reminders when submitting a PA request to the [facility] [place] MED pool ([id number]) for a retail medication:    If you have a question about the status of a PA request, please send a follow-up message to the pool; do not send a duplicate request.  Urgent requests are defined as one or more of the following situations:  The patient is at risk for severe side effects if they do not get the medication as soon as possible.  The patient is at risk for admission to the hospital if they do not get the medication as soon as possible.  The patient poses a danger to himself or herself without this medication.  When submitting a PA request, please include the insurance name, phone number, ID, and pharmacy that is requesting the PA whenever possible.

## 2024-02-15 NOTE — TELEPHONE ENCOUNTER
Please start a Prior Auth for this medication.   Semaglutide (OZEMPIC, 0.25 OR 0.5 MG/DOSE,) 2 MG/3ML pen     ANGELITO GarciaN ROSMERY  [facility]

## 2024-02-15 NOTE — TELEPHONE ENCOUNTER
Writer sent in a PA under Telephone Encounter.     ANGELITO GarciaN RN  [facility]

## 2024-02-27 ENCOUNTER — TELEPHONE (OUTPATIENT)
Dept: FAMILY MEDICINE | Facility: CLINIC | Age: 39
End: 2024-02-27
Payer: COMMERCIAL

## 2024-02-27 DIAGNOSIS — E66.811 CLASS 1 OBESITY WITH SERIOUS COMORBIDITY AND BODY MASS INDEX (BMI) OF 30.0 TO 30.9 IN ADULT, UNSPECIFIED OBESITY TYPE: ICD-10-CM

## 2024-02-28 NOTE — TELEPHONE ENCOUNTER
PRIOR AUTHORIZATION DENIED    Medication: OZEMPIC (0.25 OR 0.5 MG/DOSE) 2 MG/3ML SC SOPN  Insurance Company: [employer] - Phone [phone] Fax [phone]  Denial Date: 2/28/2024    Denial Reason(s):     Appeal Information: If provider would like to appeal please provide a letter of medical necessity.

## 2024-02-28 NOTE — TELEPHONE ENCOUNTER
Central Prior Authorization Team  Phone: [phone]    PA Initiation    Medication: OZEMPIC (0.25 OR 0.5 MG/DOSE) 2 MG/3ML SC SOPN  Insurance Company: [employer] - Phone [phone] Fax [phone]  Pharmacy Filling the Rx: [id number] [address] [place] [address] [address]  Filling Pharmacy Phone: [phone]  Filling Pharmacy Fax:    Start Date: 2/28/2024

## 2024-02-29 RX ORDER — SEMAGLUTIDE 0.68 MG/ML
INJECTION, SOLUTION SUBCUTANEOUS
Refills: 1 | OUTPATIENT
Start: 2024-02-29

## 2024-02-29 NOTE — TELEPHONE ENCOUNTER
-- PA was denied. Would you like to send a letter of appeal? Alternative prescription?    Altagracia Pedraza RN  [facility]

## 2024-03-06 ENCOUNTER — VIRTUAL VISIT (OUTPATIENT)
Dept: FAMILY MEDICINE | Facility: CLINIC | Age: 39
End: 2024-03-06
Payer: COMMERCIAL

## 2024-03-06 DIAGNOSIS — N94.6 DYSMENORRHEA: ICD-10-CM

## 2024-03-06 DIAGNOSIS — E66.811 CLASS 1 OBESITY WITH SERIOUS COMORBIDITY AND BODY MASS INDEX (BMI) OF 30.0 TO 30.9 IN ADULT, UNSPECIFIED OBESITY TYPE: ICD-10-CM

## 2024-03-06 DIAGNOSIS — Z12.31 ENCOUNTER FOR SCREENING MAMMOGRAM FOR BREAST CANCER: ICD-10-CM

## 2024-03-06 PROCEDURE — 99213 OFFICE O/P EST LOW 20 MIN: CPT | Mod: 95 | Performed by: FAMILY MEDICINE

## 2024-03-06 PROCEDURE — 93000 ELECTROCARDIOGRAM COMPLETE: CPT | Performed by: FAMILY MEDICINE

## 2024-03-06 RX ORDER — KETOROLAC TROMETHAMINE 10 MG/1
10 TABLET, FILM COATED ORAL EVERY 6 HOURS PRN
Qty: 20 TABLET | Refills: 0 | Status: SHIPPED | OUTPATIENT
Start: 2024-03-06 | End: 2024-05-02

## 2024-03-06 NOTE — PROGRESS NOTES
Lexy is a 39 year old who is being evaluated via a billable video visit.      How would you like to obtain your AVS? MyChart  If the video visit is dropped, the invitation should be resent by: Text to cell phone: [phone]  Will anyone else be joining your video visit? No          Assessment & Plan     1. Dysmenorrhea  - refilled  - further refills by GYN   - ketorolac (TORADOL) 10 MG tablet; Take 1 tablet (10 mg) by mouth every 6 hours as needed for moderate pain  Dispense: 20 tablet; Refill: 0    2. Class 1 obesity with serious comorbidity and body mass index (BMI) of 30.0 to 30.9 in adult, unspecified obesity type  - She has noted benefits of ozempic. Current 153 lbs. She is currently on ozempic 0.25 mg and has one more week left. Target weight 130 lbs. Insurance doesn't cover medication. Discussed other tx options and interested in 3 month phentermine. Home BP stable. Scheduled for EKG visit. Discussed s/e of medication. Will send script after reviewing EKG and schedule one month follow up.   - EKG 12-lead complete w/read - Clinics    3. Encounter for screening mammogram for breast cancer  - MA Screen Bilateral w/Andrei; Future     Subjective   Lexy is a 39 year old, presenting for the following health issues:  Recheck Medication    History of Present Illness       Reason for visit:  Medication check-in    She eats 2-3 servings of fruits and vegetables daily.She consumes 0 sweetened beverage(s) daily.She exercises with enough effort to increase her heart rate 20 to 29 minutes per day.  She exercises with enough effort to increase her heart rate 4 days per week.   She is taking medications regularly.     She has noted benefits of ozempic   Current 153 lbs.   She is currently on ozempic 0.25 mg and has one more week left.   Target weight 130 lbs.                   Objective           Vitals:  No vitals were obtained today due to virtual visit.    Physical Exam   GENERAL: alert and no distress  EYES: Eyes  grossly normal to inspection.  No discharge or erythema, or obvious scleral/conjunctival abnormalities.  RESP: No audible wheeze, cough, or visible cyanosis.    SKIN: Visible skin clear. No significant rash, abnormal pigmentation or lesions.  NEURO: Cranial nerves grossly intact.  Mentation and speech appropriate for age.  PSYCH: Appropriate affect, tone, and pace of words          Video-Visit Details    Type of service:  Video Visit     Originating Location (pt. Location): Home    Distant Location (provider location):  On-site  Platform used for Video Visit: Janet  Signed Electronically by: Perico Gaston MD

## 2024-03-15 ENCOUNTER — ANCILLARY PROCEDURE (OUTPATIENT)
Dept: MAMMOGRAPHY | Facility: CLINIC | Age: 39
End: 2024-03-15
Attending: FAMILY MEDICINE
Payer: COMMERCIAL

## 2024-03-15 DIAGNOSIS — Z12.31 ENCOUNTER FOR SCREENING MAMMOGRAM FOR BREAST CANCER: ICD-10-CM

## 2024-03-15 PROCEDURE — 77063 BREAST TOMOSYNTHESIS BI: CPT | Mod: TC | Performed by: RADIOLOGY

## 2024-03-15 PROCEDURE — 77067 SCR MAMMO BI INCL CAD: CPT | Mod: TC | Performed by: RADIOLOGY

## 2024-03-20 ENCOUNTER — TRANSFERRED RECORDS (OUTPATIENT)
Dept: HEALTH INFORMATION MANAGEMENT | Facility: CLINIC | Age: 39
End: 2024-03-20

## 2024-03-20 ENCOUNTER — ALLIED HEALTH/NURSE VISIT (OUTPATIENT)
Dept: FAMILY MEDICINE | Facility: CLINIC | Age: 39
End: 2024-03-20
Payer: COMMERCIAL

## 2024-03-20 DIAGNOSIS — Z12.31 ENCOUNTER FOR SCREENING MAMMOGRAM FOR BREAST CANCER: Primary | ICD-10-CM

## 2024-03-20 PROCEDURE — 99207 PR NO CHARGE NURSE ONLY: CPT

## 2024-03-20 NOTE — NURSING NOTE
EKG was done. Results presented to Dr. Gaston while she is in clinic. Ok for patient to leave. Advised patient that rx will be sent into pharmacy and set up 1 month follow up appointment with Dr. Gaston. Patient express understanding and appointment's been set up.     NED Obrien  [facility]

## 2024-03-21 ENCOUNTER — ANCILLARY PROCEDURE (OUTPATIENT)
Dept: MAMMOGRAPHY | Facility: CLINIC | Age: 39
End: 2024-03-21
Attending: FAMILY MEDICINE
Payer: COMMERCIAL

## 2024-03-21 DIAGNOSIS — N64.89 BREAST ASYMMETRY: ICD-10-CM

## 2024-03-21 PROCEDURE — 77065 DX MAMMO INCL CAD UNI: CPT | Mod: RT

## 2024-03-26 ENCOUNTER — MYC MEDICAL ADVICE (OUTPATIENT)
Dept: FAMILY MEDICINE | Facility: CLINIC | Age: 39
End: 2024-03-26
Payer: COMMERCIAL

## 2024-03-26 DIAGNOSIS — E66.811 CLASS 1 OBESITY: Primary | ICD-10-CM

## 2024-03-27 RX ORDER — PHENTERMINE HYDROCHLORIDE 37.5 MG/1
37.5 TABLET ORAL
Qty: 30 TABLET | Refills: 2 | Status: SHIPPED | OUTPATIENT
Start: 2024-03-27 | End: 2024-07-31

## 2024-05-02 DIAGNOSIS — N94.6 DYSMENORRHEA: ICD-10-CM

## 2024-05-02 RX ORDER — KETOROLAC TROMETHAMINE 10 MG/1
1 TABLET, FILM COATED ORAL EVERY 6 HOURS PRN
Qty: 20 TABLET | Refills: 0 | Status: SHIPPED | OUTPATIENT
Start: 2024-05-02 | End: 2024-05-02

## 2024-05-16 ENCOUNTER — OFFICE VISIT (OUTPATIENT)
Dept: FAMILY MEDICINE | Facility: CLINIC | Age: 39
End: 2024-05-16
Payer: COMMERCIAL

## 2024-05-16 VITALS
OXYGEN SATURATION: 100 % | DIASTOLIC BLOOD PRESSURE: 60 MMHG | RESPIRATION RATE: 20 BRPM | HEART RATE: 100 BPM | BODY MASS INDEX: 25.26 KG/M2 | SYSTOLIC BLOOD PRESSURE: 103 MMHG | HEIGHT: 65 IN | TEMPERATURE: 97.4 F | WEIGHT: 151.6 LBS

## 2024-05-16 DIAGNOSIS — E66.3 OVERWEIGHT (BMI 25.0-29.9): ICD-10-CM

## 2024-05-16 PROCEDURE — 99213 OFFICE O/P EST LOW 20 MIN: CPT | Performed by: FAMILY MEDICINE

## 2024-05-16 ASSESSMENT — PAIN SCALES - GENERAL: PAINLEVEL: NO PAIN (0)

## 2024-05-16 NOTE — PROGRESS NOTES
"  Assessment & Plan     Overweight (BMI 25.0-29.9)  - congratulated on weight loss   - Lexy will continue 1/2 tab phentermine   - She is interested in compound pharmacy for Wegov[place] and paying out of pocket and stopping phentermine   - referred to [facility] for further management   - Med Therapy Management Referral    The longitudinal plan of care for the diagnosis(es)/condition(s) as documented were addressed during this visit. Due to the added complexity in care, I will continue to support Lexy in the subsequent management and with ongoing continuity of care.           BMI  Estimated body mass index is 25.57 kg/m  as calculated from the following:    Height as of this encounter: 1.64 m (5' 4.57\").    Weight as of this encounter: 68.8 kg (151 lb 9.6 oz).             Subjective   Lexy is a 39 year old, presenting for the following health issues:  Follow Up      5/16/2024     9:15 AM   Additional Questions   Roomed by Geni   Accompanied by alone         5/16/2024     9:15 AM   Patient Reported Additional Medications   Patient reports taking the following new medications none     History of Present Illness       Reason for visit:  Weight loss check in    She eats 2-3 servings of fruits and vegetables daily.She consumes 0 sweetened beverage(s) daily.She exercises with enough effort to increase her heart rate 30 to 60 minutes per day.  She exercises with enough effort to increase her heart rate 3 or less days per week.   She is taking medications regularly.     1/2 phentermine gave irritability and anxiety. The last few weeks taking 1/2 pill and much more manageable. She also went off it due to above symptoms and then gained weight. The weight she gained she lost currently. She only picked up one month supply and has 10 days left on prescription. She liked ozempic for impulse control and minimizing food noise.           Objective    /60 (BP Location: Right arm, Patient Position: Sitting, Cuff Size: Adult Regular)   " "Pulse 100   Temp 97.4  F (36.3  C) (Temporal)   Resp 20   Ht 1.64 m (5' 4.57\")   Wt 68.8 kg (151 lb 9.6 oz)   LMP 05/04/2024   SpO2 100%   BMI 25.57 kg/m    Body mass index is 25.57 kg/m .  Physical Exam               Signed Electronically by: Perico Gaston MD    "

## 2024-05-17 ENCOUNTER — E-VISIT (OUTPATIENT)
Dept: URGENT CARE | Facility: CLINIC | Age: 39
End: 2024-05-17
Payer: COMMERCIAL

## 2024-05-17 DIAGNOSIS — J30.2 SEASONAL ALLERGIES: Primary | ICD-10-CM

## 2024-05-17 PROCEDURE — 99207 PR NON-BILLABLE SERV PER CHARTING: CPT | Performed by: FAMILY MEDICINE

## 2024-05-17 RX ORDER — CETIRIZINE HYDROCHLORIDE 10 MG/1
10 TABLET ORAL DAILY
Qty: 30 TABLET | Refills: 0 | Status: SHIPPED | OUTPATIENT
Start: 2024-05-17 | End: 2024-06-05

## 2024-05-17 NOTE — PATIENT INSTRUCTIONS
"Allergies: Care Instructions  Overview     Allergies occur when your body's defense system (immune system) overreacts to certain substances. The immune system treats a harmless substance as if it were a harmful germ or virus. Many things can make this happen. These include pollens, medicine, food, dust, animal dander, and mold.  Allergies can be mild or severe. Mild allergies can be managed with home treatment. But medicine may be needed to prevent problems.  Managing your allergies is an important part of staying healthy. Your doctor may suggest that you have allergy testing to help find out what is causing your allergies.  Severe allergies can cause reactions that affect your whole body (anaphylactic reactions). Your doctor may prescribe a shot of epinephrine to carry with you in case you have a severe reaction. Learn how to give yourself the shot and keep it with you at all times. Make sure it is not .  Follow-up care is a key part of your treatment and safety. Be sure to make and go to all appointments, and call your doctor if you are having problems. It's also a good idea to know your test results and keep a list of the medicines you take.  How can you care for yourself at home?  If you have been told by your doctor that dust or dust mites are causing your allergy, decrease the dust around your bed:  Wash sheets, pillowcases, and other bedding in hot water every week.  Use dust-proof covers for pillows, duvets, and mattresses. Avoid plastic covers because they tear easily and do not \"breathe.\" Wash as instructed on the label.  Do not use any blankets and pillows that you do not need.  Use blankets that you can wash in your washing machine.  Consider removing drapes and carpets, which attract and hold dust, from your bedroom.  If you are allergic to house dust and mites, do not use home humidifiers. Your doctor can suggest ways you can control dust and mites.  Look for signs of cockroaches. Cockroaches " cause allergic reactions. Use cockroach baits to get rid of them. Then, clean your home well. Cockroaches like areas where grocery bags, newspapers, empty bottles, or cardboard boxes are stored. Do not keep these inside your home, and keep trash and food containers sealed. Seal off any spots where cockroaches might enter your home.  If you are allergic to mold, get rid of furniture, rugs, and drapes that smell musty. Check for mold in the bathroom.  If you are allergic to outdoor pollen or mold spores, use air-conditioning. Change or clean all filters every month. Keep windows closed.  If you are allergic to pollen, stay inside when pollen counts are high. Use a vacuum  with a HEPA filter or a double-thickness filter at least two times each week.  Stay inside when air pollution is bad. Avoid paint fumes, perfumes, and other strong odors.  Avoid conditions that make your allergies worse. Stay away from smoke. Do not smoke or let anyone else smoke in your house. Do not use fireplaces or wood-burning stoves.  If you are allergic to your pets, change the air filter in your furnace every month. Use high-efficiency filters.  If you are allergic to pet dander, keep pets outside or out of your bedroom. Old carpet and cloth furniture can hold a lot of animal dander. You may need to replace them.  When should you call for help?   Give an epinephrine shot if:    You think you are having a severe allergic reaction.     You have symptoms in more than one body area, such as mild nausea and an itchy mouth.   After giving an epinephrine shot call 911, even if you feel better.  Call 911 if:    You have symptoms of a severe allergic reaction. These may include:  Sudden raised, red areas (hives) all over your body.  Swelling of the throat, mouth, lips, or tongue.  Trouble breathing.  Passing out (losing consciousness). Or you may feel very lightheaded or suddenly feel weak, confused, or restless.  Severe belly pain, nausea,  "vomiting, or diarrhea.     You have been given an epinephrine shot, even if you feel better.   Call your doctor now or seek immediate medical care if:    You have symptoms of an allergic reaction, such as:  A rash or hives (raised, red areas on the skin).  Itching.  Swelling.  Mild belly pain or nausea.   Watch closely for changes in your health, and be sure to contact your doctor if:    You do not get better as expected.   Where can you learn more?  Go to https://www.[employer].net/patiented  Enter W171 in the search box to learn more about \"Allergies: Care Instructions.\"  Current as of: September 25, 2023               Content Version: 14.0    7044-7429 [employer].   Care instructions adapted under license by your healthcare professional. If you have questions about a medical condition or this instruction, always ask your healthcare professional. [employer] disclaims any warranty or liability for your use of this information.      Thank you for choosing us for your care. I have placed an order for a prescription so that you can start treatment. View your full visit summary for details by clicking on the link below. Your pharmacist will able to address any questions you may have about the medication.     If you're not feeling better within 5-7 days, please schedule an appointment.  You can schedule an appointment right here in [facility], or call [phone]  If the visit is for the same symptoms as your eVisit, we'll refund the cost of your eVisit if seen within seven days.    "

## 2024-05-19 ENCOUNTER — OFFICE VISIT (OUTPATIENT)
Dept: URGENT CARE | Facility: URGENT CARE | Age: 39
End: 2024-05-19
Payer: COMMERCIAL

## 2024-05-19 VITALS
TEMPERATURE: 97.5 F | DIASTOLIC BLOOD PRESSURE: 79 MMHG | BODY MASS INDEX: 25.61 KG/M2 | SYSTOLIC BLOOD PRESSURE: 114 MMHG | WEIGHT: 150 LBS | RESPIRATION RATE: 14 BRPM | HEART RATE: 74 BPM | OXYGEN SATURATION: 100 % | HEIGHT: 64 IN

## 2024-05-19 DIAGNOSIS — J02.9 ACUTE PHARYNGITIS, UNSPECIFIED ETIOLOGY: ICD-10-CM

## 2024-05-19 DIAGNOSIS — J02.9 SORE THROAT: Primary | ICD-10-CM

## 2024-05-19 LAB
DEPRECATED S PYO AG THROAT QL EIA: NEGATIVE
GROUP A STREP BY PCR: NOT DETECTED

## 2024-05-19 PROCEDURE — 87651 STREP A DNA AMP PROBE: CPT | Performed by: FAMILY MEDICINE

## 2024-05-19 PROCEDURE — 99213 OFFICE O/P EST LOW 20 MIN: CPT | Performed by: FAMILY MEDICINE

## 2024-05-19 RX ORDER — AMOXICILLIN 500 MG/1
500 CAPSULE ORAL 3 TIMES DAILY
Qty: 30 CAPSULE | Refills: 0 | Status: SHIPPED | OUTPATIENT
Start: 2024-05-19 | End: 2024-05-29

## 2024-05-19 NOTE — PATIENT INSTRUCTIONS
Stay well hydrated    Take the antibiotics    Tylenol/ ibuprofen as needed    Follow up as needed based on symptoms

## 2024-05-19 NOTE — PROGRESS NOTES
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   HENT:      Head: Normocephalic.      Right Ear: Tympanic membrane, ear canal and external ear normal.      Left Ear: Tympanic membrane, ear canal and external ear normal.      Nose: Nose normal.      Mouth/Throat:      Comments: Very red posterior throat.  Small red bump right post pharynx area.  Tender in submandib area.  No markedly enlarged nodes.   Eyes:      Conjunctiva/sclera: Conjunctivae normal.   Cardiovascular:      Rate and Rhythm: Normal rate and regular rhythm.      Heart sounds: Normal heart sounds.   Pulmonary:      Effort: Pulmonary effort is normal. No respiratory distress.      Breath sounds: Normal breath sounds.   Neurological:      Mental Status: She is alert.       ASSESSMENT / PLAN:  (J02.9) Sore throat  (primary encounter diagnosis)  Comment: QS pos but patient has had symptoms for about two weeks, getting worse.  Some posterior sinus drainage.  Reasonable to treat.  Plan: Streptococcus A Rapid Screen w/Reflex to PCR -         Clinic Collect, Group A Streptococcus PCR         Throat Swab            (J02.9) Acute pharyngitis, unspecified etiology  Comment: as above   Plan: amoxicillin (AMOXIL) 500 MG capsule             Discussed over the counter meds tylenol/ ibuprofen/ benzocaine containing lozenges    Push fluids    Follow up as needed based on symptoms    Be seen promptly if symptoms acutely worsen       I reviewed the patient's medications, allergies, medical history, family history, and social history.    Miller Nettles MD

## 2024-05-20 ENCOUNTER — TELEPHONE (OUTPATIENT)
Dept: FAMILY MEDICINE | Facility: CLINIC | Age: 39
End: 2024-05-20

## 2024-05-20 NOTE — TELEPHONE ENCOUNTER
MTM referral from: [facility] visit (referral by provider)    MTM referral outreach attempt #2 on May 20, 2024 at 12:39 PM      Outcome: Left Message    Use [facility] pathfinder for the carrier/Plan on the flowsheet      [employer] Message Sent    Alejandra Gleason MA

## 2024-05-31 ENCOUNTER — OFFICE VISIT (OUTPATIENT)
Dept: PODIATRY | Facility: CLINIC | Age: 39
End: 2024-05-31
Payer: COMMERCIAL

## 2024-05-31 ENCOUNTER — ANCILLARY PROCEDURE (OUTPATIENT)
Dept: GENERAL RADIOLOGY | Facility: CLINIC | Age: 39
End: 2024-05-31
Attending: PODIATRIST
Payer: COMMERCIAL

## 2024-05-31 VITALS — BODY MASS INDEX: 25.92 KG/M2 | SYSTOLIC BLOOD PRESSURE: 118 MMHG | DIASTOLIC BLOOD PRESSURE: 80 MMHG | WEIGHT: 151 LBS

## 2024-05-31 DIAGNOSIS — M76.61 ACHILLES TENDINITIS OF BOTH LOWER EXTREMITIES: ICD-10-CM

## 2024-05-31 DIAGNOSIS — M76.62 ACHILLES TENDINITIS OF BOTH LOWER EXTREMITIES: ICD-10-CM

## 2024-05-31 DIAGNOSIS — M25.571 CHRONIC ANKLE PAIN, BILATERAL: ICD-10-CM

## 2024-05-31 DIAGNOSIS — M21.6X2 EQUINUS DEFORMITY OF BOTH FEET: ICD-10-CM

## 2024-05-31 DIAGNOSIS — G89.29 CHRONIC ANKLE PAIN, BILATERAL: Primary | ICD-10-CM

## 2024-05-31 DIAGNOSIS — M25.572 CHRONIC ANKLE PAIN, BILATERAL: Primary | ICD-10-CM

## 2024-05-31 DIAGNOSIS — M25.571 CHRONIC ANKLE PAIN, BILATERAL: Primary | ICD-10-CM

## 2024-05-31 DIAGNOSIS — G89.29 CHRONIC ANKLE PAIN, BILATERAL: ICD-10-CM

## 2024-05-31 DIAGNOSIS — M21.6X1 EQUINUS DEFORMITY OF BOTH FEET: ICD-10-CM

## 2024-05-31 DIAGNOSIS — M25.572 CHRONIC ANKLE PAIN, BILATERAL: ICD-10-CM

## 2024-05-31 PROCEDURE — 99203 OFFICE O/P NEW LOW 30 MIN: CPT | Performed by: PODIATRIST

## 2024-05-31 PROCEDURE — 73630 X-RAY EXAM OF FOOT: CPT | Mod: TC | Performed by: RADIOLOGY

## 2024-05-31 NOTE — PROGRESS NOTES
PATIENT HISTORY:    Lexy Sinha is a 39 year old female who presents to clinic for pain to both ankles.  Has been going on for 7 years.  Feels achy.  Feels like tight muscles.  Can be 7 out of 10.  Worse with walking or activity or when using her feet.  She has tried physical therapy dry needling ice and rest.  Nothing has seemed to help.  Denies specific injury.  Wondering what is causing this and what can be done for it.    Review of Systems:  Patient denies fever, chills, rash, wound, numbness, weakness, heart burn, blood in stool, chest pain with activity, calf pain when walking, shortness of breath with activity, chronic cough, easy bleeding/bruising, swelling of ankles, excessive thirst, fatigue, depression, anxiety.  Patient admits to limping, stiffness.     PAST MEDICAL HISTORY:   Past Medical History:   Diagnosis Date    Acne     Hypertension         PAST SURGICAL HISTORY:   Past Surgical History:   Procedure Laterality Date    PARTIAL HYMENECTOMY          MEDICATIONS:   Current Outpatient Medications:     ketorolac (TORADOL) 10 MG tablet, Take 1 tablet (10 mg) by mouth every 6 hours as needed for moderate pain, Disp: 30 tablet, Rfl: 5    phentermine (ADIPEX-P) 37.5 MG tablet, Take 1 tablet (37.5 mg) by mouth every morning (before breakfast), Disp: 30 tablet, Rfl: 2    spironolactone (ALDACTONE) 25 MG tablet, Take 1 tablet (25 mg) by mouth 2 times daily for 360 days, Disp: 180 tablet, Rfl: 3    tiZANidine (ZANAFLEX) 2 MG tablet, Take 1 tablet (2 mg) by mouth 3 times daily, Disp: 30 tablet, Rfl: 3    cetirizine (ZYRTEC) 10 MG tablet, Take 1 tablet (10 mg) by mouth daily (Patient not taking: Reported on 5/19/2024), Disp: 30 tablet, Rfl: 0    clindamycin (CLEOCIN T) 1 % external solution, Apply topically 2 times daily (Patient not taking: Reported on 5/16/2024), Disp: 60 mL, Rfl: 1     ALLERGIES:    Allergies   Allergen Reactions    Doxycycline Rash    Nickel Rash        SOCIAL HISTORY:   Social  History     Socioeconomic History    Marital status:      Spouse name: Not on file    Number of children: Not on file    Years of education: Not on file    Highest education level: Not on file   Occupational History    Not on file   Tobacco Use    Smoking status: Never    Smokeless tobacco: Never   Vaping Use    Vaping status: Never Used   Substance and Sexual Activity    Alcohol use: Yes     Comment: occ    Drug use: No    Sexual activity: Yes     Partners: Male     Birth control/protection: Condom   Other Topics Concern    Not on file   Social History Narrative    Not on file     Social Determinants of Health     Financial Resource Strain: Low Risk  (11/15/2023)    Financial Resource Strain     Within the past 12 months, have you or your family members you live with been unable to get utilities (heat, electricity) when it was really needed?: No   Food Insecurity: Low Risk  (11/15/2023)    Food Insecurity     Within the past 12 months, did you worry that your food would run out before you got money to buy more?: No     Within the past 12 months, did the food you bought just not last and you didn t have money to get more?: No   Transportation Needs: Low Risk  (11/15/2023)    Transportation Needs     Within the past 12 months, has lack of transportation kept you from medical appointments, getting your medicines, non-medical meetings or appointments, work, or from getting things that you need?: No   Physical Activity: Not on File (11/10/2021)    Received from [facility]     Physical Activity     Physical Activity: 0   Stress: Not on File (11/10/2021)    Received from [facility]     Stress     Stress: 0   Social Connections: Not on File (11/10/2021)    Received from [facility]     Social Connections     Social Connections and Isolation: 0   Interpersonal Safety: Low Risk  (5/16/2024)    Interpersonal Safety     Do you feel physically and emotionally safe where you currently live?: Yes     Within the  past 12 months, have you been hit, slapped, kicked or otherwise physically hurt by someone?: No     Within the past 12 months, have you been humiliated or emotionally abused in other ways by your partner or ex-partner?: No   Housing Stability: Low Risk  (11/15/2023)    Housing Stability     Do you have housing? : Yes     Are you worried about losing your housing?: No        FAMILY HISTORY:   Family History   Problem Relation Age of Onset    Cerebrovascular Disease Mother     Hypertension Mother     Cancer Father     Hypertension Father     Macular Degeneration Maternal Grandmother     Glaucoma Maternal Grandmother     Melanoma Paternal Aunt         EXAM:Vitals: /80   Wt 68.5 kg (151 lb)   LMP 05/04/2024   BMI 25.92 kg/m    BMI= Body mass index is 25.92 kg/m .    General appearance: Patient is alert and fully cooperative with history & exam.  No sign of distress is noted during the visit.     Psychiatric: Affect is pleasant & appropriate.  Patient appears motivated to improve health.     Respiratory: Breathing is regular & unlabored while sitting.     HEENT: Hearing is intact to spoken word.  Speech is clear.  No gross evidence of visual impairment that would impact ambulation.     Dermatologic: Skin is intact to both lower extremities without significant lesions, rash or abrasion.  No paronychia or evidence of soft tissue infection is noted.     Vascular: DP & PT pulses are intact & regular bilaterally.  No significant edema or varicosities noted.  CFT and skin temperature is normal to both lower extremities.     Neurologic: Lower extremity sensation is intact to light touch.  No evidence of weakness or contracture in the lower extremities.  No evidence of neuropathy.     Musculoskeletal: Patient is ambulatory without assistive device or brace.  Pain on palpation of the posterior aspect of the Achilles approximately 4 to 5 cm from the insertion and to the calf muscle.  She does have decreased dorsiflexion  of both ankles.  Muscle strength is 5 out of 5 without pain.    Radiographs: Bilateral foot x-rays - I personally reviewed the xrays - decrease arch height.  Minimal posterior heel spurs noted at Achilles tendon insertion.  No fractures are noted.     ASSESSMENT:    Chronic ankle pain, bilateral  Achilles tendinitis of both lower extremities  Equinus deformity of both feet     Medical Decision Making/Plan:  Review[facility] in [facility]. Reviewed and discussed causes of tendonitis.  We discussed treatments such as immobiliation, icing, stretching, heel lifts, orthotics, physical therapy, MRI.     Talked about Achilles tendinitis as well as equinus which is contracture of the Achilles tendon.  She does appear tight to the tendons on exam.  We will get baseline x-rays today but I am concerned as she was doing well and then started to have pain if there is some tearing of the Achilles tendon.  We will order MRIs of both ankles.  If there is tendon tears we discussed surgery is likely what we would need to do.  If there are no tendon tears we discussed possibly lengthening the tendon versus trying night splints to try to help gradually stretch out the Achilles tendon.  She was given information on the night splints.  We will MyChart or call her with the MRI results.  If the MRI results come back completely negative discussed possibly trying lab work for systemic arthritis's.  She does note pain to other areas of the body and muscle joint stiffness.    All questions were answered to patient's satisfaction and she will call further questions or concerns.    Patient risk factor: Patient is at low risk for infection.        Cheryl Price DPM, Podiatry/Foot and Ankle Surgery

## 2024-05-31 NOTE — LETTER
5/31/2024         RE: Lexy Sinha  [address]  [facility] [address]        Dear Colleague,    Thank you for referring your patient, Lexy Sinha, to the [facility]. Please see a copy of my visit note below.    PATIENT HISTORY:    Lexy Sinha is a 39 year old female who presents to clinic for pain to both ankles.  Has been going on for 7 years.  Feels achy.  Feels like tight muscles.  Can be 7 out of 10.  Worse with walking or activity or when using her feet.  She has tried physical therapy dry needling ice and rest.  Nothing has seemed to help.  Denies specific injury.  Wondering what is causing this and what can be done for it.    Review of Systems:  Patient denies fever, chills, rash, wound, numbness, weakness, heart burn, blood in stool, chest pain with activity, calf pain when walking, shortness of breath with activity, chronic cough, easy bleeding/bruising, swelling of ankles, excessive thirst, fatigue, depression, anxiety.  Patient admits to limping, stiffness.     PAST MEDICAL HISTORY:   Past Medical History:   Diagnosis Date     Acne      Hypertension         PAST SURGICAL HISTORY:   Past Surgical History:   Procedure Laterality Date     PARTIAL HYMENECTOMY          MEDICATIONS:   Current Outpatient Medications:      ketorolac (TORADOL) 10 MG tablet, Take 1 tablet (10 mg) by mouth every 6 hours as needed for moderate pain, Disp: 30 tablet, Rfl: 5     phentermine (ADIPEX-P) 37.5 MG tablet, Take 1 tablet (37.5 mg) by mouth every morning (before breakfast), Disp: 30 tablet, Rfl: 2     spironolactone (ALDACTONE) 25 MG tablet, Take 1 tablet (25 mg) by mouth 2 times daily for 360 days, Disp: 180 tablet, Rfl: 3     tiZANidine (ZANAFLEX) 2 MG tablet, Take 1 tablet (2 mg) by mouth 3 times daily, Disp: 30 tablet, Rfl: 3     cetirizine (ZYRTEC) 10 MG tablet, Take 1 tablet (10 mg) by mouth daily (Patient not taking: Reported on 5/19/2024), Disp: 30 tablet,  Rfl: 0     clindamycin (CLEOCIN T) 1 % external solution, Apply topically 2 times daily (Patient not taking: Reported on 5/16/2024), Disp: 60 mL, Rfl: 1     ALLERGIES:    Allergies   Allergen Reactions     Doxycycline Rash     Nickel Rash        SOCIAL HISTORY:   Social History     Socioeconomic History     Marital status:      Spouse name: Not on file     Number of children: Not on file     Years of education: Not on file     Highest education level: Not on file   Occupational History     Not on file   Tobacco Use     Smoking status: Never     Smokeless tobacco: Never   Vaping Use     Vaping status: Never Used   Substance and Sexual Activity     Alcohol use: Yes     Comment: occ     Drug use: No     Sexual activity: Yes     Partners: Male     Birth control/protection: Condom   Other Topics Concern     Not on file   Social History Narrative     Not on file     Social Determinants of Health     Financial Resource Strain: Low Risk  (11/15/2023)    Financial Resource Strain      Within the past 12 months, have you or your family members you live with been unable to get utilities (heat, electricity) when it was really needed?: No   Food Insecurity: Low Risk  (11/15/2023)    Food Insecurity      Within the past 12 months, did you worry that your food would run out before you got money to buy more?: No      Within the past 12 months, did the food you bought just not last and you didn t have money to get more?: No   Transportation Needs: Low Risk  (11/15/2023)    Transportation Needs      Within the past 12 months, has lack of transportation kept you from medical appointments, getting your medicines, non-medical meetings or appointments, work, or from getting things that you need?: No   Physical Activity: Not on File (11/10/2021)    Received from [facility]     Physical Activity      Physical Activity: 0   Stress: Not on File (11/10/2021)    Received from [facility]     Stress      Stress: 0   Social  Connections: Not on File (11/10/2021)    Received from [facility]     Social Connections      Social Connections and Isolation: 0   Interpersonal Safety: Low Risk  (5/16/2024)    Interpersonal Safety      Do you feel physically and emotionally safe where you currently live?: Yes      Within the past 12 months, have you been hit, slapped, kicked or otherwise physically hurt by someone?: No      Within the past 12 months, have you been humiliated or emotionally abused in other ways by your partner or ex-partner?: No   Housing Stability: Low Risk  (11/15/2023)    Housing Stability      Do you have housing? : Yes      Are you worried about losing your housing?: No        FAMILY HISTORY:   Family History   Problem Relation Age of Onset     Cerebrovascular Disease Mother      Hypertension Mother      Cancer Father      Hypertension Father      Macular Degeneration Maternal Grandmother      Glaucoma Maternal Grandmother      Melanoma Paternal Aunt         EXAM:Vitals: /80   Wt 68.5 kg (151 lb)   LMP 05/04/2024   BMI 25.92 kg/m    BMI= Body mass index is 25.92 kg/m .    General appearance: Patient is alert and fully cooperative with history & exam.  No sign of distress is noted during the visit.     Psychiatric: Affect is pleasant & appropriate.  Patient appears motivated to improve health.     Respiratory: Breathing is regular & unlabored while sitting.     HEENT: Hearing is intact to spoken word.  Speech is clear.  No gross evidence of visual impairment that would impact ambulation.     Dermatologic: Skin is intact to both lower extremities without significant lesions, rash or abrasion.  No paronychia or evidence of soft tissue infection is noted.     Vascular: DP & PT pulses are intact & regular bilaterally.  No significant edema or varicosities noted.  CFT and skin temperature is normal to both lower extremities.     Neurologic: Lower extremity sensation is intact to light touch.  No evidence of weakness or  contracture in the lower extremities.  No evidence of neuropathy.     Musculoskeletal: Patient is ambulatory without assistive device or brace.  Pain on palpation of the posterior aspect of the Achilles approximately 4 to 5 cm from the insertion and to the calf muscle.  She does have decreased dorsiflexion of both ankles.  Muscle strength is 5 out of 5 without pain.    Radiographs: Bilateral foot x-rays - I personally reviewed the xrays - decrease arch height.  Minimal posterior heel spurs noted at Achilles tendon insertion.  No fractures are noted.     ASSESSMENT:    Chronic ankle pain, bilateral  Achilles tendinitis of both lower extremities  Equinus deformity of both feet     Medical Decision Making/Plan:  Reviewed patient's chart in [facility]. Reviewed and discussed causes of tendonitis.  We discussed treatments such as immobiliation, icing, stretching, heel lifts, orthotics, physical therapy, MRI.     Talked about Achilles tendinitis as well as equinus which is contracture of the Achilles tendon.  She does appear tight to the tendons on exam.  We will get baseline x-rays today but I am concerned as she was doing well and then started to have pain if there is some tearing of the Achilles tendon.  We will order MRIs of both ankles.  If there is tendon tears we discussed surgery is likely what we would need to do.  If there are no tendon tears we discussed possibly lengthening the tendon versus trying night splints to try to help gradually stretch out the Achilles tendon.  She was given information on the night splints.  We will MyChart or call her with the MRI results.  If the MRI results come back completely negative discussed possibly trying lab work for systemic arthritis's.  She does note pain to other areas of the body and muscle joint stiffness.    All questions were answered to patient's satisfaction and she will call further questions or concerns.    Patient risk factor: Patient is at low risk for infection.         Cheryl Price DPM, Podiatry/Foot and Ankle Surgery      Again, thank you for allowing me to participate in the care of your patient.        Sincerely,        Cheryl Price DPM, Podiatry/Foot and Ankle Surgery

## 2024-05-31 NOTE — PATIENT INSTRUCTIONS
Thank you for choosing [facility] Podiatry / Foot & Ankle Surgery!    DR VIDALES'S CLINIC:  [facility]   [address]   [place] [address]      TRIAGE LINE: [phone]  APPOINTMENTS: [phone]  RADIOLOGY: [phone]  SET UP SURGERY: [phone]  PHYSICAL THERAPY: [phone]   FAX NUMBER: [phone]  BILLING QUESTIONS: [phone]       Follow up: Will My Chart with MRI results      Please call to schedule your MRI/CT/Ultrasound/Arthrogram appointment.  The number is [phone].    SUPER FEET INSERTS- GREEN    EQUINUS / ACHILLES TENDON LENGTHENING  The Achilles tendon and calf muscles play a critical role in normal foot and ankle function. Tightness of the muscle and tendon complex prohibits the normal amount of ankle flexibility. Inadequate ankle dorsiflexion, also known as equinus, results in compensatory stress throughout the leg and foot.     Unnatural stress on the foot results in arch collapse, foot pronation, plantar fasciitis, pain in the ball of the foot, hammer toes, bunions, arthritis, foot wounds, etc. The knee, hip, and leg muscles may also be affected by ankle stiffness. People with diabetes have additional problems with tightness of the Achilles tendon. The combination of foot numbness and a tight Achilles may lead to pressure sores in the ball of the foot. Bone breakdown through the midfoot may also occur as a complication of equinus.     Initial treatment might involve stretching exercises. Stretching will keep the muscles loose but you will not likely accomplish making the tendon longer. Daily Achilles stretching is important   nonetheless. Surgical lengthening of the tendon is often required. .   Surgery can be performed in several ways. The tendon can be lengthened at the ankle level (Achilles lengthening) or in the mid calf (Gastroc lengthening). Achilles and gastrocnemius lengthening can be performed as an isolated procedure or in combination with  surgical procedures for other conditions.   Lengthening procedures are most commonly performed for treatment of flatfoot deformity, contracture associated with diabetes related forefoot and midfoot ulcers, contracture after injury or stroke and to treat Achilles tendonitis.   The goal of surgery is to make the tendon longer yet still preserve function of the calf muscles. Most surgical patients do not have noticeable weakness once they recover from the surgery.     The main potential hazard of surgery involves tendon rupture during the healing process. Rupture is not common but could potentially occur. The tendon can also become excessively lengthened, especially from the stress of premature walking before the tendon is healed. Tendon adhesion, skin adhesion and nerve irritation or numbness can also occur.    CALF AND ACHILLES TENDON STRECHES   Stretch gently, do not bounce.   Do each set of stretches three times a day while you are having symptoms.   Do each set of stretches once a day after symptoms are relieved.     1. Towel Stretch   Sit on hard surface with one leg straight out in front of you.   Loop a towel around the ball of the foot and pull the towel to your body.   Be sure to keep your leg straight.   You should feel tension in the calf muscle of the straight leg.   Repeat 3 times on each side for at least 15 seconds each.     2. Standing Calf Stretch   Stand about a foot from a wall and extend one leg behind you.   Keep both feet flat on the floor, toes pointed straight ahead, and the rear knee  straight.   Lean into the wall until you feel tension in the rear leg.   Hold for at least 15 seconds.   Repeat 2 times on each side.     3. Standing Achilles Tendon Stretch   Get into position of Standing Calf Stretch.   Lower the hips downward as you slightly bend the knee of the rear leg.   Keep both feet flat on the floor and toes straight ahead.   Hold for at least 15 seconds.   Repeat 2 times on each side.       TENDONITIS   Tendons are the strong fibrous portions of muscles that attach to bones and allow the muscle to move a joint when it contracts. Tendons are very strong because they have a lot of force exerted on them. Sometimes tendons can become painful because they have suffered an acute injury, in which too much force was exerted at one time, or an overuse injury, in which a normal force was exerted too frequently or over a prolonged period of time. As a result, there is damage to the tendon and its surrounding soft tissue structures and they become inflammed. Because tendons do not have a great blood supply, they do not heal rapidly and the inflammation can become chronic.   Conservative treatment for tendinitis involves rest and anti-inflammatory measures. Ice is applied 15 minutes 2-3 times daily. Anti-inflammatory medications called NSAIDs (ibuprofen, example) can be taken provided they are used with caution, as they can lead to internal bleeding and increase the risk ofstroke and heart attack. Sometimes topical nitroglycerin is prescribed to help with pain. Often your doctor will use a special shoe or removable walking cast to immobilize the tendon, allowing it to heal without further damage from use. These devices are very useful in helping tendons heal, but they may slow you down or make you feel like your hip, knee, or back are out ofalignment. This is temporary and should go away once you are out ofthe immobilization. You should not use a walking cast when showering or driving. Another option is Platelet Rich Plasma injections. (Normally done with a Sports and Orthorapedic doctor.   If conservative measures fail, your physician may need to surgically repair the tendon by removing any chronic inflammatory tissue and sewing it back together. Sometimes it is sewn to an adjacent tendon with similar function for support and sometimes it is lengthened. . Sometimes the bones around the tendon need to be  realigned or reshaped to better support the tendon or prevent further damage. Your foot and ankle surgeon will discuss the specifics of your surgery with you, should you need it.    Towel stretch: Sit on a hard surface with your injured leg stretched out in front of you. Loop a towel around your toes and the ball of your foot and pull the towel toward your body keeping your leg straight. Hold this position for 15 to 30 seconds and then relax. Repeat 3 times. Then push the towel away with the ball of your foot. Repeat 3 times.  When you don't feel much of a stretch using the towel, you can start the standing calf stretch and the following exercises.  Standing calf stretch: Stand facing a wall with your hands on the wall at about eye level. Keep your injured leg back with your heel on the floor. Keep the other leg forward with the knee bent. Turn your back foot slightly inward (as if you were pigeon-toed). Slowly lean into the wall until you feel a stretch in the back of your calf. Hold the stretch for 15 to 30 seconds. Return to the starting position. Repeat 3 times. Do this exercise several times each day.   Standing soleus stretch: Stand facing a wall with your hands on the wall at about chest height. Keep your injured leg back with your heel on the floor. Keep the other leg forward with the knee bent. Turn your back foot slightly inward (as if you were pigeon-toed). Bend your back knee slightly and gently lean into the wall until you feel a stretch in the lower calf of your injured leg. Hold the stretch for 15 to 30 seconds. Return to the starting position. Repeat 3 times.   Achilles stretch: Stand with the ball of one foot on a stair. Reach for the step below with your heel until you feel a stretch in the arch of your foot. Hold this position for 15 to 30 seconds and then relax. Repeat 3 times.   Heel raise: Balance yourself while standing behind a chair or counter. Using the chair or counter as a support to help  you, raise your body up onto your toes and hold for 5 seconds. Then slowly lower yourself down without holding onto the support. (It's OK to keep holding onto the support if you need to.) When this exercise becomes less painful, try lowering yourself down on the injured leg only. Repeat 15 times. Do 2 sets of 15. Rest 30 seconds between sets.   Step-up: Stand with the foot of your injured leg on a support 3 to 5 inches high (like a small step or block of wood). Keep your other foot flat on the floor. Shift your weight onto the injured leg on the support. Straighten your injured leg as the other leg comes off the floor. Return to the starting position by bending your injured leg and slowly lowering your uninjured leg back to the floor. Do 2 sets of 15.   Resisted ankle eversion: Sit with both legs stretched out in front of you, with your feet about a shoulder's width apart. Tie a loop in one end of elastic tubing. Put the foot of your injured leg through the loop so that the tubing goes around the arch of that foot and wraps around the outside of the other foot. Hold onto the other end of the tubing with your hand to provide tension. Turn the foot of your injured leg up and out. Make sure you keep your other foot still so that it will allow the tubing to stretch as you move the foot of your injured leg. Return to the starting position. Do 2 sets of 15.   Balance and reach exercises: Stand next to a chair with your injured leg farther from the chair. The chair will provide support if you need it. Stand on the foot of your injured leg and bend your knee slightly. Try to raise the arch of this foot while keeping your big toe on the floor. Keep your foot in this position. With the hand that is farther away from the chair, reach forward in front of you by bending at the waist. Avoid bending your knee any more as you do this. Repeat this 10 times. To make the exercise more challenging, reach farther in front of you. Do 2  sets of 10.  the same position as above. While keeping your arch height, reach the hand that is farther away from the chair across your body toward the chair. The farther you reach, the more challenging the exercise. Do 2 sets of 10.   Resisted ankle eversion: Sit with both legs stretched out in front of you, with your feet about a shoulder's width apart. Tie a loop in one end of elastic tubing. Put the foot of your injured leg through the loop so that the tubing goes around the arch of that foot and wraps around the outside of the other foot. Hold onto the other end of the tubing with your hand to provide tension. Turn the foot of your injured leg up and out. Make sure you keep your other foot still so that it will allow the tubing to stretch as you move the foot of your injured leg. Return to the starting position. Do 2 sets of 15.

## 2024-06-03 ENCOUNTER — MYC MEDICAL ADVICE (OUTPATIENT)
Dept: PODIATRY | Facility: CLINIC | Age: 39
End: 2024-06-03
Payer: COMMERCIAL

## 2024-06-03 NOTE — PROGRESS NOTES
Medication Therapy Management (MTM) Encounter    ASSESSMENT:                            Medication Adherence/Access: No issues identified    Weight Management:   Patient would benefit from  Starting fv cmpd semaglutide 0.25mg./week  and lifestyle modifications.       PLAN:                            1. Lexy--thx for discussion today --last clinic weight is 151lbs --you desire to be closer to 130lbs . And had success with Ozempic --since not covered by insurance [place] does Compounded Semaglutide  (active ingredient in Ozempic) --lets START you back at 0.25mg./week injection --I sent rx to [facility] --call them at [phone] to set up payment and delivery .    Perhaps if working well you can wean off the phentermine.    Please ask them about traveling out of the country with the compounded Semaglutide .    Continue to work on excellent diet/exercise (lifestyle) changes.     Follow-up: Return in about 4 weeks (around 7/3/2024), or @ 9 AM via telephone, for Medication Therapy Management Visit, Weight loss.  SUBJECTIVE/OBJECTIVE:                          Lexy Sinha is a 39 year old female called for an initial visit. She was referred to me from Perico Gaston  .      Reason for visit:   Reason for Referral:  weight        Allergies/ADRs: Reviewed in chart  Past Medical History: Reviewed in chart  Tobacco: She reports that she has never smoked. She has never used smokeless tobacco.  Alcohol: Less than 1 beverages / week  E-cigarette Use: none , no MJ use.  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  Personal Healthcare Goals: 130lbs.--goal wt.before covid, was on ozempic then.  Activity: pickleball but injures her ankle , take it easy right now.      Medication Adherence/Access: no issues reported    Weight Management (has OCD as well)  Phentermine 37.5 mg -1/2 tab /day all she  "could tolerate. (Anxiety on full tab).  Ozempic 0.5mg./week-was on it for wt.loss and too stop food noise -loved it but HP ins. Cut her off it -none since mid march 2024.  Patient reports no current medication side effects. When on Ozempic loose stools and heartburn)  Nutrition/Eating Habits:   Exercise/Activity: pickleball if ankle not injured.  Medication History:  Phentermine:    Ozempic: 0.5mg/week worked well  when      Wt Readings from Last 4 Encounters:   05/31/24 151 lb (68.5 kg)   05/19/24 150 lb (68 kg)   05/16/24 151 lb 9.6 oz (68.8 kg)   01/26/24 158 lb (71.7 kg)     Estimated body mass index is 25.92 kg/m  as calculated from the following:    Height as of 5/19/24: 5' 4\" (1.626 m).    Weight as of 5/31/24: 151 lb (68.5 kg).      BP Readings from Last 3 Encounters:   05/31/24 118/80   05/19/24 114/79   05/16/24 103/60     ------------------------------------------------------------------------------------------------------------------    I spent 30 minutes with this patient today. All changes were made via collaborative practice agreement with Perico Gaston MD. A copy of the visit note was provided to the patient's provider(s).    A summary of these recommendations was sent via [employer].    Abby Srinivasan [facility].  Medication Therapy Management Provider  [phone]      Telemedicine Visit Details  Type of service:  Telephone visit  Start Time: 1:30 PM  End Time: 2:00 PM     Medication Therapy Recommendations  Overweight (BMI 25.0-29.9)    Current Medication: COMPOUNDED NON-CONTROLLED SUBSTANCE (CMPD RX) - PHARMACY TO MIX COMPOUNDED MEDICATION   Rationale: Synergistic therapy - Needs additional medication therapy - Indication   Recommendation: Start Medication   Status: Accepted per CPA              "

## 2024-06-05 ENCOUNTER — TELEPHONE (OUTPATIENT)
Dept: PODIATRY | Facility: CLINIC | Age: 39
End: 2024-06-05
Payer: COMMERCIAL

## 2024-06-05 ENCOUNTER — MYC MEDICAL ADVICE (OUTPATIENT)
Dept: PHARMACY | Facility: CLINIC | Age: 39
End: 2024-06-05
Payer: COMMERCIAL

## 2024-06-05 ENCOUNTER — VIRTUAL VISIT (OUTPATIENT)
Dept: PHARMACY | Facility: CLINIC | Age: 39
End: 2024-06-05
Payer: COMMERCIAL

## 2024-06-05 DIAGNOSIS — M76.61 ACHILLES TENDONITIS, BILATERAL: ICD-10-CM

## 2024-06-05 DIAGNOSIS — M76.62 ACHILLES TENDONITIS, BILATERAL: ICD-10-CM

## 2024-06-05 DIAGNOSIS — E66.3 OVERWEIGHT (BMI 25.0-29.9): Primary | ICD-10-CM

## 2024-06-05 DIAGNOSIS — G89.29 CHRONIC ANKLE PAIN, BILATERAL: Primary | ICD-10-CM

## 2024-06-05 DIAGNOSIS — M25.571 CHRONIC ANKLE PAIN, BILATERAL: Primary | ICD-10-CM

## 2024-06-05 DIAGNOSIS — M25.572 CHRONIC ANKLE PAIN, BILATERAL: Primary | ICD-10-CM

## 2024-06-05 PROCEDURE — 99605 MTMS BY PHARM NP 15 MIN: CPT | Mod: 93 | Performed by: PHARMACIST

## 2024-06-05 PROCEDURE — 99607 MTMS BY PHARM ADDL 15 MIN: CPT | Mod: 93 | Performed by: PHARMACIST

## 2024-06-05 NOTE — TELEPHONE ENCOUNTER
Please fax the MRI order of the left and right ankle over to [facility] and let patient know they can schedule at: Phone: [phone]  .     Thanks,     Cheryl Price DPM

## 2024-06-05 NOTE — Clinical Note
Litoa--thx for mtm referral --starte dher on fv cmpd semaglutide --she seems very happy to get back on this drug and will wean off the phentermine soon.  -Abby

## 2024-06-05 NOTE — PATIENT INSTRUCTIONS
"Recommendations from today's MTM visit:                                                    MTM (medication therapy management) is a service provided by a clinical pharmacist designed to help you get the most of out of your medicines.   Today we reviewed what your medicines are for, how to know if they are working, that your medicines are safe and how to make your medicine regimen as easy as possible.      1. Lexy--thx for discussion today --last clinic weight is 151lbs --you desire to be closer to 130lbs . And had success with Ozempic --since not covered by insurance [place] does Compounded Semaglutide  (active ingredient in Ozempic) --lets START you back at 0.25mg./week injection --I sent rx to [facility] pharmacy --call them at [phone] to set up payment and delivery .    Perhaps if working well you can wean off the phentermine.    Please ask them about traveling out of the country with the compounded Semaglutide .    Continue to work on excellent diet/exercise (lifestyle) changes.     Follow-up: Return in about 4 weeks (around 7/3/2024), or @ 9 AM via telephone, for Medication Therapy Management Visit, Weight loss.    It was great speaking with you today.  I value your experience and would be very thankful for your time in providing feedback in our clinic survey. In the next few days, you may receive an email or text message from [employer] with a link to a survey related to your  clinical pharmacist.\"     To schedule another MTM appointment, please call the clinic directly or you may call the MTM scheduling line at [phone] or toll-free at [phone].     My Clinical Pharmacist's contact information:                                                      Please feel free to contact me with any questions or concerns you have.      Abby Srinivasan [facility].  Medication Therapy Management Provider  [phone]    "

## 2024-06-05 NOTE — TELEPHONE ENCOUNTER
[facility] Call Center    Phone Message    May a detailed message be left on voicemail: yes     Reason for Call: Other: pt calling as she would like at order for an MRI sent to [facility]. The Fax number would be: [phone]     Action Taken: Other: te    Travel Screening: Not Applicable     Date of Service:

## 2024-06-10 NOTE — TELEPHONE ENCOUNTER
Denton Gillespie -     The pharmacy couldn't fill my order before I leave for my trip, so I'm just going to start the prescription when I get home. They are delivering the first four doses on 7/3. So perhaps we set up a new call for a month or so after that?     Thank you!  Ezio      [facility] notes --will reschedule ezio --4 week sout from 7-3-24.    Abby Srinivasan [facility].  Medication Therapy Management Provider  [phone]

## 2024-06-12 ENCOUNTER — TRANSFERRED RECORDS (OUTPATIENT)
Dept: HEALTH INFORMATION MANAGEMENT | Facility: CLINIC | Age: 39
End: 2024-06-12
Payer: COMMERCIAL

## 2024-06-14 ENCOUNTER — MYC MEDICAL ADVICE (OUTPATIENT)
Dept: PODIATRY | Facility: CLINIC | Age: 39
End: 2024-06-14
Payer: COMMERCIAL

## 2024-06-14 NOTE — TELEPHONE ENCOUNTER
Wu Pugh,    I received your MRI results.    Left ankle MRi is negative for tendon tear or inflammation. No fractures.  Does show a ganglion cyst to the foot but not in the area of your pain.     MRI of right ankle shows a little inflammation to the achilles tendon but no tears.       Given that the MrI's didn't show much pathology, my concern is that you may have something more underlying going like a systemic arthritis.       We can order some lab work to assess for this.     I recommend follow up in clinic to further discuss.    Cheryl Price DPM

## 2024-07-03 ENCOUNTER — OFFICE VISIT (OUTPATIENT)
Dept: PODIATRY | Facility: CLINIC | Age: 39
End: 2024-07-03
Payer: COMMERCIAL

## 2024-07-03 VITALS — WEIGHT: 151 LBS | SYSTOLIC BLOOD PRESSURE: 122 MMHG | BODY MASS INDEX: 25.92 KG/M2 | DIASTOLIC BLOOD PRESSURE: 80 MMHG

## 2024-07-03 DIAGNOSIS — M21.6X2 EQUINUS DEFORMITY OF BOTH FEET: ICD-10-CM

## 2024-07-03 DIAGNOSIS — G89.29 CHRONIC ANKLE PAIN, BILATERAL: Primary | ICD-10-CM

## 2024-07-03 DIAGNOSIS — M76.62 ACHILLES TENDONITIS, BILATERAL: ICD-10-CM

## 2024-07-03 DIAGNOSIS — M25.572 CHRONIC ANKLE PAIN, BILATERAL: Primary | ICD-10-CM

## 2024-07-03 DIAGNOSIS — M25.571 CHRONIC ANKLE PAIN, BILATERAL: Primary | ICD-10-CM

## 2024-07-03 DIAGNOSIS — M76.61 ACHILLES TENDONITIS, BILATERAL: ICD-10-CM

## 2024-07-03 DIAGNOSIS — M21.6X1 EQUINUS DEFORMITY OF BOTH FEET: ICD-10-CM

## 2024-07-03 PROCEDURE — 99213 OFFICE O/P EST LOW 20 MIN: CPT | Performed by: PODIATRIST

## 2024-07-03 RX ORDER — DEXAMETHASONE SODIUM PHOSPHATE 4 MG/ML
4 INJECTION, SOLUTION INTRA-ARTICULAR; INTRALESIONAL; INTRAMUSCULAR; INTRAVENOUS; SOFT TISSUE SEE ADMIN INSTRUCTIONS
Qty: 30 ML | Refills: 0 | Status: SHIPPED | OUTPATIENT
Start: 2024-07-03 | End: 2024-08-28

## 2024-07-03 RX ORDER — DEXAMETHASONE SODIUM PHOSPHATE 4 MG/ML
4 INJECTION, SOLUTION INTRA-ARTICULAR; INTRALESIONAL; INTRAMUSCULAR; INTRAVENOUS; SOFT TISSUE SEE ADMIN INSTRUCTIONS
Qty: 30 ML | Refills: 0 | Status: SHIPPED | OUTPATIENT
Start: 2024-07-03

## 2024-07-03 NOTE — PROGRESS NOTES
Podiatry / Foot and Ankle Surgery Progress Note    July 3, 2024    Subject: Patient was seen for follow-up on MRI results.    Objective:  Vitals: /80   Wt 68.5 kg (151 lb)   LMP 05/04/2024   BMI 25.92 kg/m    BMI= Body mass index is 25.92 kg/m .    General:  Patient is alert and orientated.  NAD.    Dermatologic: Skin is intact to both lower extremities without significant lesions, rash or abrasion.  No paronychia or evidence of soft tissue infection is noted.     Vascular: DP & PT pulses are intact & regular bilaterally.  No significant edema or varicosities noted.  CFT and skin temperature is normal to both lower extremities.     Neurologic: Lower extremity sensation is intact to light touch.  No evidence of weakness or contracture in the lower extremities.  No evidence of neuropathy.     Musculoskeletal: Patient is ambulatory without assistive device or brace.  Pain on palpation of the posterior aspect of the Achilles approximately 4 to 5 cm from the insertion and to the calf muscle.  She does have decreased dorsiflexion of both ankles.  Muscle strength is 5 out of 5 without pain.     Radiographs: Bilateral foot x-rays - I personally reviewed the xrays - decrease arch height.  Minimal posterior heel spurs noted at Achilles tendon insertion.  No fractures are noted.       MRI left foot -Achilles tendon is intact.  No tendinitis or bursitis noted.  Small lobulated fluid signal from the posterior aspect of the subtalar joint likely a ganglion cyst.  No other tendon pathology or ligament pathology noted.minimal Achilles tendinopathy without tendon tear.  Otherwise unremarkable.    MRI right ankle-very mild Achilles tendinopathy noted.  Otherwise unremarkable.    ASSESSMENT:    Chronic ankle pain, bilateral  Achilles tendinitis of both lower extremities  Equinus deformity of both feet     Medical Decision Making/Plan:  [facility].  Reviewed and discussed MRI results.  Explained that  given the lack of advanced tendinitis and concerned about a possible systemic arthritis for her chronic pain.  Will order some lab work to assess for this.  If this comes back positive we will have her follow-up with her primary care doctor or possible rheumatology.  Also ordered physical therapy with iontophoresis as she did have some minimal tendinitis to the Achilles tendon on the right ankle.  She would also like some vitamin B and vitamin 6 labs ordered to see if she is deficient in this.  We will MyChart or call her with the results.     All questions were answered to patient's satisfaction and she will call further questions or concerns.     Patient risk factor: Patient is at low risk for infection.        Cheryl Price DPM, Podiatry/Foot and Ankle Surgery

## 2024-07-03 NOTE — PATIENT INSTRUCTIONS
Thank you for choosing [facility] Podiatry / Foot & Ankle Surgery!    DR VIDALES'S CLINIC:  [facility]   [address]   [place] [address]      TRIAGE LINE: [phone]  APPOINTMENTS: [phone]  RADIOLOGY: [phone]  SET UP SURGERY: [phone]  PHYSICAL THERAPY: [phone]   FAX NUMBER: [phone]  BILLING QUESTIONS: [phone]       Follow up: As needed      IONTOPHORESIS  You have been prescribed iontophoresis therapy with physical therapy. Iontophoresis (a.k.a. Electromotive Drug Administration (EMDA)) is a technique using a small electric charge to deliver a medicine or other chemical through the skin. It is basically an injection without the needle. It is used by physical therapists to treat a variety of conditions. It is a type of electrical stimulation that is used to administer medication into your body through your skin. There are many different uses for iontophoresis. These include, but are not limited to:  Decrease inflammation, Decrease pain, Decrease muscle spasm, Decrease swelling and edema, Reduce calcium deposits in the body, Manage scar tissue, ect....    A typical iontophoresis treatment takes 10 to 20 minutes.    Before scheduling with physical therapy or picking up the medication, please check with your insurance to understand your coverage of this service by physical therapy. You can still have this treatment if not covered by insurance, physical therapy will have you sign a waiver that you wish to proceed with treatment.  When checking with your insurance about coverage, the billing code (CPT code) they will need to check is:  12071.    Please fill prescription at a [facility] if you have difficulty getting the medication at another pharmacy.    Different Medications:    1.  Dexamethasone Sodium Phosphate, 4mg/ml injectable, 30 cc total volume.  2.  Diclofenac Sodium 1.5 % Soln, 30cc  3.  Ketoprofen Sodium 10 - 30%  4.  Naproxen Sodium 4 8  %     EQUINUS / ACHILLES TENDON LENGTHENING  The Achilles tendon and calf muscles play a critical role in normal foot and ankle function. Tightness of the muscle and tendon complex prohibits the normal amount of ankle flexibility. Inadequate ankle dorsiflexion, also known as equinus, results in compensatory stress throughout the leg and foot.     Unnatural stress on the foot results in arch collapse, foot pronation, plantar fasciitis, pain in the ball of the foot, hammer toes, bunions, arthritis, foot wounds, etc. The knee, hip, and leg muscles may also be affected by ankle stiffness. People with diabetes have additional problems with tightness of the Achilles tendon. The combination of foot numbness and a tight Achilles may lead to pressure sores in the ball of the foot. Bone breakdown through the midfoot may also occur as a complication of equinus.     Initial treatment might involve stretching exercises. Stretching will keep the muscles loose but you will not likely accomplish making the tendon longer. Daily Achilles stretching is important   nonetheless. Surgical lengthening of the tendon is often required. .   Surgery can be performed in several ways. The tendon can be lengthened at the ankle level (Achilles lengthening) or in the mid calf (Gastroc lengthening). Achilles and gastrocnemius lengthening can be performed as an isolated procedure or in combination with surgical procedures for other conditions.   Lengthening procedures are most commonly performed for treatment of flatfoot deformity, contracture associated with diabetes related forefoot and midfoot ulcers, contracture after injury or stroke and to treat Achilles tendonitis.   The goal of surgery is to make the tendon longer yet still preserve function of the calf muscles. Most surgical patients do not have noticeable weakness once they recover from the surgery.     The main potential hazard of surgery involves tendon rupture during the healing  process. Rupture is not common but could potentially occur. The tendon can also become excessively lengthened, especially from the stress of premature walking before the tendon is healed. Tendon adhesion, skin adhesion and nerve irritation or numbness can also occur.    CALF AND ACHILLES TENDON STRECHES   Stretch gently, do not bounce.   Do each set of stretches three times a day while you are having symptoms.   Do each set of stretches once a day after symptoms are relieved.     1. Towel Stretch   Sit on hard surface with one leg straight out in front of you.   Loop a towel around the ball of the foot and pull the towel to your body.   Be sure to keep your leg straight.   You should feel tension in the calf muscle of the straight leg.   Repeat 3 times on each side for at least 15 seconds each.     2. Standing Calf Stretch   Stand about a foot from a wall and extend one leg behind you.   Keep both feet flat on the floor, toes pointed straight ahead, and the rear knee  straight.   Lean into the wall until you feel tension in the rear leg.   Hold for at least 15 seconds.   Repeat 2 times on each side.     3. Standing Achilles Tendon Stretch   Get into position of Standing Calf Stretch.   Lower the hips downward as you slightly bend the knee of the rear leg.   Keep both feet flat on the floor and toes straight ahead.   Hold for at least 15 seconds.   Repeat 2 times on each side.

## 2024-07-03 NOTE — LETTER
7/3/2024      Lexy Sinha  [address]  [facility] [place] [address]      Dear Colleague,    Thank you for referring your patient, Lexy Sinha, to the [facility] PODIATRY. Please see a copy of my visit note below.    Podiatry / Foot and Ankle Surgery Progress Note    July 3, 2024    Subject: Patient was seen for follow-up on MRI results.    Objective:  Vitals: /80   Wt 68.5 kg (151 lb)   LMP 05/04/2024   BMI 25.92 kg/m    BMI= Body mass index is 25.92 kg/m .    General:  Patient is alert and orientated.  NAD.    Dermatologic: Skin is intact to both lower extremities without significant lesions, rash or abrasion.  No paronychia or evidence of soft tissue infection is noted.     Vascular: DP & PT pulses are intact & regular bilaterally.  No significant edema or varicosities noted.  CFT and skin temperature is normal to both lower extremities.     Neurologic: Lower extremity sensation is intact to light touch.  No evidence of weakness or contracture in the lower extremities.  No evidence of neuropathy.     Musculoskeletal: Patient is ambulatory without assistive device or brace.  Pain on palpation of the posterior aspect of the Achilles approximately 4 to 5 cm from the insertion and to the calf muscle.  She does have decreased dorsiflexion of both ankles.  Muscle strength is 5 out of 5 without pain.     Radiographs: Bilateral foot x-rays - I personally reviewed the xrays - decrease arch height.  Minimal posterior heel spurs noted at Achilles tendon insertion.  No fractures are noted.       MRI left foot -Achilles tendon is intact.  No tendinitis or bursitis noted.  Small lobulated fluid signal from the posterior aspect of the subtalar joint likely a ganglion cyst.  No other tendon pathology or ligament pathology noted.minimal Achilles tendinopathy without tendon tear.  Otherwise unremarkable.    MRI right ankle-very mild Achilles tendinopathy noted.  Otherwise  unremarkable.    ASSESSMENT:    Chronic ankle pain, bilateral  Achilles tendinitis of both lower extremities  Equinus deformity of both feet     Medical Decision Making/Plan:  Reviewed patient's chart in [facility].  Reviewed and discussed MRI results.  Explained that given the lack of advanced tendinitis and concerned about a possible systemic arthritis for her chronic pain.  Will order some lab work to assess for this.  If this comes back positive we will have her follow-up with her primary care doctor or possible rheumatology.  Also ordered physical therapy with iontophoresis as she did have some minimal tendinitis to the Achilles tendon on the right ankle.  She would also like some vitamin B and vitamin 6 labs ordered to see if she is deficient in this.  We will MyChart or call her with the results.     All questions were answered to patient's satisfaction and she will call further questions or concerns.     Patient risk factor: Patient is at low risk for infection.        Cheryl Price DPM, Podiatry/Foot and Ankle Surgery      Again, thank you for allowing me to participate in the care of your patient.        Sincerely,        Cheryl Price DPM, Podiatry/Foot and Ankle Surgery

## 2024-07-04 ASSESSMENT — ACTIVITIES OF DAILY LIVING (ADL)
LIFTING_AN_OBJECT,_LIKE_A_BAG_OF_GROCERIES_FROM_THE_FLOOR: NO DIFFICULTY
ROLLING_OVER_IN_BED: NO DIFFICULTY
GETTING_INTO_OR_OUT_OF_A_CAR: NO DIFFICULTY
STANDING_FOR_1_HOUR: QUITE A BIT OF DIFFICULTY
PLEASE_INDICATE_YOR_PRIMARY_REASON_FOR_REFERRAL_TO_THERAPY:: FOOT AND/OR ANKLE
LEFS_SCORE(%): 0
SITTING_FOR_1_HOUR: QUITE A BIT OF DIFFICULTY
PUTTING_ON_YOUR_SHOES_OR_SOCKS: NO DIFFICULTY
ANY_OF_YOUR_USUAL_WORK,_HOUSEWORK_OR_SCHOOL_ACTIVITIES: A LITTLE BIT OF DIFFICULTY
WALKING_A_MILE: EXTREME DIFFICULTY OR UNABLE TO PERFORM ACTIVITY
SHOPPING: EXTREME DIFFICULTY OR UNABLE TO PERFORM ACTIVITY
PERFORMING_LIGHT_ACTIVITIES_AROUND_YOUR_HOME: A LITTLE BIT OF DIFFICULTY
WALKING_BETWEEN_ROOMS: A LITTLE BIT OF DIFFICULTY
GOING_UP_OR_DOWN_10_STAIRS: QUITE A BIT OF DIFFICULTY
RUNNING_ON_EVEN_GROUND: EXTREME DIFFICULTY OR UNABLE TO PERFORM ACTIVITY
RUNNING_ON_UNEVEN_GROUND: EXTREME DIFFICULTY OR UNABLE TO PERFORM ACTIVITY
MAKING_SHARP_TURNS_WHILE_RUNNING_FAST: EXTREME DIFFICULTY OR UNABLE TO PERFORM ACTIVITY
PERFORMING_HEAVY_ACTIVITIES_AROUND_YOUR_HOME: A LITTLE BIT OF DIFFICULTY
SQUATTING: NO DIFFICULTY
YOUR_USUAL_HOBBIES,_RECREATIONAL_OR_SPORTING_ACTIVITIES: QUITE A BIT OF DIFFICULTY
LEFS_RAW_SCORE: 0
GETTING_INTO_AND_OUT_OF_A_BATH: NO DIFFICULTY
WALKING_2_BLOCKS: A LITTLE BIT OF DIFFICULTY

## 2024-07-05 NOTE — PROGRESS NOTES
"PHYSICAL THERAPY EVALUATION  Type of Visit: Evaluation       Fall Risk Screen:  Fall screen completed by: PT  Have you fallen 2 or more times in the past year?: Yes  Have you fallen and had an injury in the past year?: No  Is patient a fall risk?: No    Subjective     Pt is a 39 year old B ankle pain presenting with complaints of R>L posterior ankle pain on achilles.   The symptoms started about 10 years ago without GAGAN.   Prior treatments: dry needling, \"scraping\", PT in the past a few times.   Pain is worse with prolonged walking or activity.   Pain is better with massage, wrapping her ankle with activity  Sleep Quality: wears a boot about 3x/week and it helps about 10%.   Current level of activity: pickleball 2x/week with wrapping ankle   Goals of therapy: prolonged walking, pickleball    XR foot B  IMPRESSION: Normal joint spaces and alignment. No acute fracture.    Per Podiatrist:  MRI left foot -Achilles tendon is intact.  No tendinitis or bursitis noted.  Small lobulated fluid signal from the posterior aspect of the subtalar joint likely a ganglion cyst.  No other tendon pathology or ligament pathology noted.minimal Achilles tendinopathy without tendon tear.  Otherwise unremarkable.  MRI right ankle-very mild Achilles tendinopathy noted.  Otherwise unremarkable.        Presenting condition or subjective complaint: Achilles inflamation  Date of onset: 07/08/14    Relevant medical history:     Dates & types of surgery:      Prior diagnostic imaging/testing results: MRI; X-ray     Prior therapy history for the same diagnosis, illness or injury: Yes      Prior Level of Function  Transfers:   Ambulation:   ADL:   IADL:     Living Environment  Social support: With a significant other or spouse   Type of home: House   Stairs to enter the home: Yes 4 Is there a railing: No  Ramp: No   Stairs inside the home: Yes 15 Is there a railing: Yes  Help at home: None  Equipment owned:     Employment: Yes Content " creator  Hobbies/Interests: Improv, pickleball  Patient goals for therapy: Pickleball :)    Pain assessment:      Objective       FOOT/ANKLE EVALUATION    PAIN: Pain Level at Rest: 0/10  Pain Level with Use: 5/10  Pain Location:  mid achilles   Pain Quality: Aching and Sharp  Pain Frequency: intermittent  Pain is Exacerbated By: activity   Pain is Relieved By: rest    Edema:    General: none currently but pt noting that the right can get a bit swollen depending on her activity level     GAIT:   Weightbearing Status: WBAT  Assistive Device(s): None  Gait Deviations:  pes planus B, slight ER of R foot> L, B foot slapping down  with walking bear foot      WEIGHT BEARING ALIGNMENT: Foot/Ankle WB Alignment: Pes planus R>L, Slight R ER foot. Posterior view with achilles straight     ROM: WFL - slightly tighter on R vs L    STRENGTH:   Pain: - none + mild ++ moderate +++ severe  Strength Scale: 0-5/5 Right Left   Ankle Dorsiflexion 5 5   Ankle Plantarflexion Supine strong - unable to complete SL calf raise  Supine strong - able to complete a few reps of SL calf raise with small ROM    Ankle Inversion 4 4+   Ankle Eversion 5 5   Great Toe Flexion     Great Toe Extension 5 4+   Anterior Tibialis     Posterior Tibialis         DL calf raise: compensates with lateral foot and ligament stability   SL calf raise: unable to on R, Small ROM and only a few reps on L   Foot coordination: unable to dome foot well, some increase in pain on R with extra activation. Minimal ability to separate big toe from the little toes with foot yoga.       Palpation: mid achilles R>L      Assessment & Plan   CLINICAL IMPRESSIONS  Medical Diagnosis: Chronic ankle pain, bilateral,  Equinus deformity of both feet,  Achilles tendonitis, bilateral    Treatment Diagnosis: B ankle pain   Impression/Assessment: Patient is a 39 year old female with B ankle complaints.  The following significant findings have been identified: Pain, Decreased ROM/flexibility,  Decreased joint mobility, Decreased strength, Impaired balance, Inflammation, Impaired gait, Impaired muscle performance, Decreased activity tolerance, and Impaired posture. These impairments interfere with their ability to perform self care tasks, work tasks, recreational activities, and community mobility as compared to previous level of function.     Clinical Decision Making (Complexity):  Clinical Presentation: Stable/Uncomplicated  Clinical Presentation Rationale: based on medical and personal factors listed in PT evaluation  Clinical Decision Making (Complexity): Low complexity    PLAN OF CARE  Treatment Interventions:  Modalities: Dry Needling, Iontophoresis  Interventions: Gait Training, Manual Therapy, Neuromuscular Re-education, Therapeutic Activity, Therapeutic Exercise, Self-Care/Home Management  7-p-  Long Term Goals     PT Goal 1  Goal Identifier: Walking  Goal Description: Pt will be able to walk for >30 min without increase in symptoms <2/10 NRS  Rationale: to maximize safety and independence with performance of ADLs and functional tasks;to maximize safety and independence within the home;to maximize safety and independence within the community;to maximize safety and independence with transportation;to maximize safety and independence with self cares  Target Date: 08/19/24  PT Goal 2  Goal Identifier: Pickleball  Goal Description: Pt will be able to play pickleball without pain or without worry of pain in order to increase healthy habits and QOL  Target Date: 08/19/24      Frequency of Treatment: 1x/week  Duration of Treatment: 6 weeks    Recommended Referrals to Other Professionals:   Education Assessment:   Learner/Method: Patient  Education Comments: Eager to participate in therapy    Risks and benefits of evaluation/treatment have been explained.   Patient/Family/caregiver agrees with Plan of Care.     Evaluation Time:     PT Eval, Low Complexity Minutes (06823): 20       Signing Clinician: Ivette  ELLEN Daniel, PT

## 2024-07-06 ENCOUNTER — LAB (OUTPATIENT)
Dept: LAB | Facility: CLINIC | Age: 39
End: 2024-07-06
Payer: COMMERCIAL

## 2024-07-06 DIAGNOSIS — M25.571 CHRONIC ANKLE PAIN, BILATERAL: ICD-10-CM

## 2024-07-06 DIAGNOSIS — M76.61 ACHILLES TENDONITIS, BILATERAL: ICD-10-CM

## 2024-07-06 DIAGNOSIS — M21.6X1 EQUINUS DEFORMITY OF BOTH FEET: ICD-10-CM

## 2024-07-06 DIAGNOSIS — G89.29 CHRONIC ANKLE PAIN, BILATERAL: ICD-10-CM

## 2024-07-06 DIAGNOSIS — M76.62 ACHILLES TENDONITIS, BILATERAL: ICD-10-CM

## 2024-07-06 DIAGNOSIS — M21.6X2 EQUINUS DEFORMITY OF BOTH FEET: ICD-10-CM

## 2024-07-06 DIAGNOSIS — M25.572 CHRONIC ANKLE PAIN, BILATERAL: ICD-10-CM

## 2024-07-06 LAB
RHEUMATOID FACT SERPL-ACNC: <10 IU/ML
URATE SERPL-MCNC: 5.3 MG/DL (ref 2.4–5.7)
VIT B12 SERPL-MCNC: 827 PG/ML (ref 232–1245)
VIT D+METAB SERPL-MCNC: 32 NG/ML (ref 20–50)

## 2024-07-06 PROCEDURE — 82306 VITAMIN D 25 HYDROXY: CPT

## 2024-07-06 PROCEDURE — 86039 ANTINUCLEAR ANTIBODIES (ANA): CPT

## 2024-07-06 PROCEDURE — 36415 COLL VENOUS BLD VENIPUNCTURE: CPT

## 2024-07-06 PROCEDURE — 86038 ANTINUCLEAR ANTIBODIES: CPT

## 2024-07-06 PROCEDURE — 84550 ASSAY OF BLOOD/URIC ACID: CPT

## 2024-07-06 PROCEDURE — 84207 ASSAY OF VITAMIN B-6: CPT | Mod: 90

## 2024-07-06 PROCEDURE — 86431 RHEUMATOID FACTOR QUANT: CPT

## 2024-07-06 PROCEDURE — 82607 VITAMIN B-12: CPT

## 2024-07-06 PROCEDURE — 99000 SPECIMEN HANDLING OFFICE-LAB: CPT

## 2024-07-08 ENCOUNTER — THERAPY VISIT (OUTPATIENT)
Dept: PHYSICAL THERAPY | Facility: CLINIC | Age: 39
End: 2024-07-08
Attending: PODIATRIST
Payer: COMMERCIAL

## 2024-07-08 DIAGNOSIS — M25.572 CHRONIC ANKLE PAIN, BILATERAL: ICD-10-CM

## 2024-07-08 DIAGNOSIS — M21.6X2 EQUINUS DEFORMITY OF BOTH FEET: ICD-10-CM

## 2024-07-08 DIAGNOSIS — M76.62 ACHILLES TENDONITIS, BILATERAL: ICD-10-CM

## 2024-07-08 DIAGNOSIS — M21.6X1 EQUINUS DEFORMITY OF BOTH FEET: ICD-10-CM

## 2024-07-08 DIAGNOSIS — G89.29 CHRONIC ANKLE PAIN, BILATERAL: ICD-10-CM

## 2024-07-08 DIAGNOSIS — M25.571 CHRONIC ANKLE PAIN, BILATERAL: ICD-10-CM

## 2024-07-08 DIAGNOSIS — M76.61 ACHILLES TENDONITIS, BILATERAL: ICD-10-CM

## 2024-07-08 LAB
ANA PAT SER IF-IMP: ABNORMAL
ANA SER QL IF: ABNORMAL
ANA TITR SER IF: ABNORMAL {TITER}

## 2024-07-08 PROCEDURE — 97110 THERAPEUTIC EXERCISES: CPT | Mod: GP | Performed by: PHYSICAL THERAPIST

## 2024-07-08 PROCEDURE — 97033 APP MDLTY 1+IONTPHRSIS EA 15: CPT | Mod: GP | Performed by: PHYSICAL THERAPIST

## 2024-07-08 PROCEDURE — 97161 PT EVAL LOW COMPLEX 20 MIN: CPT | Mod: GP | Performed by: PHYSICAL THERAPIST

## 2024-07-10 LAB — PYRIDOXAL PHOS SERPL-SCNC: 104.7 NMOL/L

## 2024-07-15 ENCOUNTER — THERAPY VISIT (OUTPATIENT)
Dept: PHYSICAL THERAPY | Facility: CLINIC | Age: 39
End: 2024-07-15
Payer: COMMERCIAL

## 2024-07-15 DIAGNOSIS — M21.6X2 EQUINUS DEFORMITY OF BOTH FEET: ICD-10-CM

## 2024-07-15 DIAGNOSIS — M25.571 CHRONIC ANKLE PAIN, BILATERAL: Primary | ICD-10-CM

## 2024-07-15 DIAGNOSIS — M21.6X1 EQUINUS DEFORMITY OF BOTH FEET: ICD-10-CM

## 2024-07-15 DIAGNOSIS — M76.62 ACHILLES TENDONITIS, BILATERAL: ICD-10-CM

## 2024-07-15 DIAGNOSIS — M76.61 ACHILLES TENDONITIS, BILATERAL: ICD-10-CM

## 2024-07-15 DIAGNOSIS — G89.29 CHRONIC ANKLE PAIN, BILATERAL: Primary | ICD-10-CM

## 2024-07-15 DIAGNOSIS — M25.572 CHRONIC ANKLE PAIN, BILATERAL: Primary | ICD-10-CM

## 2024-07-15 PROCEDURE — 97033 APP MDLTY 1+IONTPHRSIS EA 15: CPT | Mod: GP | Performed by: PHYSICAL THERAPIST

## 2024-07-15 PROCEDURE — 97110 THERAPEUTIC EXERCISES: CPT | Mod: GP | Performed by: PHYSICAL THERAPIST

## 2024-07-16 ENCOUNTER — MYC MEDICAL ADVICE (OUTPATIENT)
Dept: PHARMACY | Facility: CLINIC | Age: 39
End: 2024-07-16
Payer: COMMERCIAL

## 2024-07-16 DIAGNOSIS — E66.3 OVERWEIGHT (BMI 25.0-29.9): Primary | ICD-10-CM

## 2024-07-16 DIAGNOSIS — E66.3 OVERWEIGHT (BMI 25.0-29.9): ICD-10-CM

## 2024-07-16 NOTE — TELEPHONE ENCOUNTER
Denton Mora -     Would you be able to send an order for .05 over to [facility] pharmacy? I need to refill about two weeks out (to make sure they have the medication in stock) and think it would be good to increase the dosage slightly.     Thank you!  Lexy Spencer will send fv cmpd semaglutide 0.5mg rx today .    Abby Srinivasan [facility].  Medication Therapy Management Provider  [phone]

## 2024-07-17 ENCOUNTER — MYC MEDICAL ADVICE (OUTPATIENT)
Dept: FAMILY MEDICINE | Facility: CLINIC | Age: 39
End: 2024-07-17

## 2024-07-17 ENCOUNTER — VIRTUAL VISIT (OUTPATIENT)
Dept: FAMILY MEDICINE | Facility: CLINIC | Age: 39
End: 2024-07-17
Payer: COMMERCIAL

## 2024-07-17 DIAGNOSIS — M25.50 ARTHRALGIA, UNSPECIFIED JOINT: Primary | ICD-10-CM

## 2024-07-17 DIAGNOSIS — R58 ECCHYMOSIS: ICD-10-CM

## 2024-07-17 PROCEDURE — 99213 OFFICE O/P EST LOW 20 MIN: CPT | Mod: 95 | Performed by: FAMILY MEDICINE

## 2024-07-17 NOTE — PROGRESS NOTES
"Lexy is a 39 year old who is being evaluated via a billable video visit.          Assessment & Plan     Arthralgia, unspecified joint  - Borderline positive CELINE  - will order below for further evaluation  - will try to schedule with community rheumatologist which is covered by insurance   - DNA double stranded antibodies; Future  - CRP, inflammation; Future  - ESR: Erythrocyte sedimentation rate; Future  - Cyclic Citrullinated Peptide Antibody IgG; Future  - MOHAN antibody panel; Future          BMI  Estimated body mass index is 25.92 kg/m  as calculated from the following:    Height as of 5/19/24: 1.626 m (5' 4\").    Weight as of 7/3/24: 68.5 kg (151 lb).             Subjective   Lexy is a 39 year old, presenting for the following health issues:  Follow Up (Auto-immune condition. Was referred to rheumatology but appoint is further out so would like to be seen sooner if needing other tests.)      7/17/2024     4:25 PM   Additional Questions   Roomed by Ramin FULLER   Accompanied by n/a     History of Present Illness       Reason for visit:  Follow up labs/auto-immune condition      She has had chronic pain. She went through PT for her ankle. She started seeing an accupuncture which helped. She one day used a massage gun which helped but had bruising. Pain in ankle came back after playing pickelball. She schedule a visit with podiatrist and had imaging. She has had pain for 10 years to rule out autoimmune etiology. She had a borderline positive CELINE. Rheumatology appt Dec 2024. She doesn't know a lot of family history.       She has numbness/tingling  of the 2-5th digits. MRI and EMG reassuring and showed no acute changes. Was seen by neurologist. Declined ortho referral          Objective           Vitals:  No vitals were obtained today due to virtual visit.    Physical Exam             Phone visit 12.3 minutes     "

## 2024-07-18 ENCOUNTER — LAB (OUTPATIENT)
Dept: LAB | Facility: CLINIC | Age: 39
End: 2024-07-18
Payer: COMMERCIAL

## 2024-07-18 DIAGNOSIS — R58 ECCHYMOSIS: ICD-10-CM

## 2024-07-18 DIAGNOSIS — M25.50 ARTHRALGIA, UNSPECIFIED JOINT: ICD-10-CM

## 2024-07-18 LAB
APTT PPP: 29 SECONDS (ref 22–38)
CRP SERPL-MCNC: <3 MG/L
ERYTHROCYTE [DISTWIDTH] IN BLOOD BY AUTOMATED COUNT: 11.7 % (ref 10–15)
ERYTHROCYTE [SEDIMENTATION RATE] IN BLOOD BY WESTERGREN METHOD: 9 MM/HR (ref 0–20)
FIBRINOGEN PPP-MCNC: 329 MG/DL (ref 170–490)
HCT VFR BLD AUTO: 45.9 % (ref 35–47)
HGB BLD-MCNC: 15.3 G/DL (ref 11.7–15.7)
INR PPP: 1.09 (ref 0.85–1.15)
MCH RBC QN AUTO: 31.4 PG (ref 26.5–33)
MCHC RBC AUTO-ENTMCNC: 33.3 G/DL (ref 31.5–36.5)
MCV RBC AUTO: 94 FL (ref 78–100)
PLATELET # BLD AUTO: 161 10E3/UL (ref 150–450)
RBC # BLD AUTO: 4.88 10E6/UL (ref 3.8–5.2)
TSH SERPL DL<=0.005 MIU/L-ACNC: 1.52 UIU/ML (ref 0.3–4.2)
WBC # BLD AUTO: 9.3 10E3/UL (ref 4–11)

## 2024-07-18 PROCEDURE — 86140 C-REACTIVE PROTEIN: CPT

## 2024-07-18 PROCEDURE — 85610 PROTHROMBIN TIME: CPT

## 2024-07-18 PROCEDURE — 86235 NUCLEAR ANTIGEN ANTIBODY: CPT

## 2024-07-18 PROCEDURE — 84443 ASSAY THYROID STIM HORMONE: CPT

## 2024-07-18 PROCEDURE — 86200 CCP ANTIBODY: CPT

## 2024-07-18 PROCEDURE — 86225 DNA ANTIBODY NATIVE: CPT

## 2024-07-18 PROCEDURE — 36415 COLL VENOUS BLD VENIPUNCTURE: CPT

## 2024-07-18 PROCEDURE — 80076 HEPATIC FUNCTION PANEL: CPT

## 2024-07-18 PROCEDURE — 85730 THROMBOPLASTIN TIME PARTIAL: CPT

## 2024-07-18 PROCEDURE — 85384 FIBRINOGEN ACTIVITY: CPT

## 2024-07-18 PROCEDURE — 85027 COMPLETE CBC AUTOMATED: CPT

## 2024-07-18 PROCEDURE — 85652 RBC SED RATE AUTOMATED: CPT

## 2024-07-19 LAB
CCP AB SER IA-ACNC: 0.8 U/ML
DSDNA AB SER-ACNC: 1.3 IU/ML
ENA SM IGG SER IA-ACNC: <0.7 U/ML
ENA SM IGG SER IA-ACNC: NEGATIVE
ENA SS-A AB SER IA-ACNC: <0.5 U/ML
ENA SS-A AB SER IA-ACNC: NEGATIVE
ENA SS-B IGG SER IA-ACNC: <0.6 U/ML
ENA SS-B IGG SER IA-ACNC: NEGATIVE
U1 SNRNP IGG SER IA-ACNC: 2 U/ML
U1 SNRNP IGG SER IA-ACNC: NEGATIVE

## 2024-07-19 NOTE — TELEPHONE ENCOUNTER
Dr. Gaston: did you request these pictures from 7/17/ virtual visit?    ANGELITO MejiaN, PHN, RN  [facility]  [phone]

## 2024-07-23 ENCOUNTER — THERAPY VISIT (OUTPATIENT)
Dept: PHYSICAL THERAPY | Facility: CLINIC | Age: 39
End: 2024-07-23
Attending: PODIATRIST
Payer: COMMERCIAL

## 2024-07-23 DIAGNOSIS — M21.6X2 EQUINUS DEFORMITY OF BOTH FEET: ICD-10-CM

## 2024-07-23 DIAGNOSIS — M21.6X1 EQUINUS DEFORMITY OF BOTH FEET: ICD-10-CM

## 2024-07-23 DIAGNOSIS — G89.29 CHRONIC ANKLE PAIN, BILATERAL: Primary | ICD-10-CM

## 2024-07-23 DIAGNOSIS — M25.571 CHRONIC ANKLE PAIN, BILATERAL: Primary | ICD-10-CM

## 2024-07-23 DIAGNOSIS — M76.62 ACHILLES TENDONITIS, BILATERAL: ICD-10-CM

## 2024-07-23 DIAGNOSIS — M76.61 ACHILLES TENDONITIS, BILATERAL: ICD-10-CM

## 2024-07-23 DIAGNOSIS — M25.572 CHRONIC ANKLE PAIN, BILATERAL: Primary | ICD-10-CM

## 2024-07-23 PROCEDURE — 97110 THERAPEUTIC EXERCISES: CPT | Mod: GP | Performed by: PHYSICAL THERAPIST

## 2024-07-23 PROCEDURE — 97033 APP MDLTY 1+IONTPHRSIS EA 15: CPT | Mod: GP | Performed by: PHYSICAL THERAPIST

## 2024-07-24 LAB
ALBUMIN SERPL BCG-MCNC: 4.5 G/DL (ref 3.5–5.2)
ALP SERPL-CCNC: 85 U/L (ref 40–150)
ALT SERPL W P-5'-P-CCNC: 18 U/L (ref 0–50)
AST SERPL W P-5'-P-CCNC: 22 U/L (ref 0–45)
BILIRUB DIRECT SERPL-MCNC: <0.2 MG/DL (ref 0–0.3)
BILIRUB SERPL-MCNC: 0.4 MG/DL
PROT SERPL-MCNC: 7.5 G/DL (ref 6.4–8.3)

## 2024-07-28 NOTE — PROGRESS NOTES
Medication Therapy Management (MTM) Encounter    ASSESSMENT:                            Medication Adherence/Access: No issues identified    Weight Management:   Patient would benefit from : increasing weekly Semaglutide dose now to 0.5mg. --and continue with lifestyle changes.      PLAN:                                1.  Lexy- begin Semaglutide 0.5mg. weekly dose via vial now --see below for syringe instructions on dosing. Keep excellent diet/exercise lifestyle pan.      How to Inject Compounded Weight Loss Medication- Semaglutide      How it is supplied:  You will receive 1ml or 2ml vial of medicine depending on your dose. You will also be sent syringes and needles for a month supply.    Storage:   Keep your medication stored in the fridge.   The vials are to be discarded 28 days after first use or expiration date, whichever is first (even if there is remaining medication).     Injecting:  Remove your vial from the refrigerator and allow to reach room temperature by sitting out on a counter/ table.  Using the syringes provided with the medication vial, withdraw the prescribed amount of medication to fill the syringe for your dose.             Follow the instructions to fill the syringe with medicine:       Follow the guide below to inject your medication:       [facility]: [phone]                Return in about 4 weeks (around 8/28/2024), or @ 11:30 AM via telephone, for Medication Therapy Management Visit, Weight loss.  SUBJECTIVE/OBJECTIVE:                          Lexy Sinha is a 39 year old female called for a follow-up (6-5-24 )visit. She was referred to me from Perico Gaston  .      Reason for visit:   Reason for Referral:  weight        Allergies/ADRs: Reviewed in chart  Past Medical History: Reviewed in chart  Tobacco: She reports that she has never smoked. She has never been exposed to tobacco smoke. She has never used smokeless tobacco.  Alcohol: Less than 1 beverages /  "week  E-cigarette Use: none , no MJ use.  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  Personal Healthcare Goals: 130lbs.--goal wt.before covid, was on ozempic then.  Activity: pickleball but injures her ankle , take it easy right now.      Medication Adherence/Access: no issues reported    Weight Management (has OCD as well):  Semaglutide 0.25mg./week-home weight 148lbs.-- today will receive semaglutide 0.5mg ./week dose --she injects on Thursdays.  Tiny bit of nausea/loose stool --very manageable.  Eats proteins first, lots of water daily.  Exercise--lots of pickleball.      Off all phentermine now. 7-5-24.    Previously :  Phentermine 37.5 mg -1/2 tab /day all she could tolerate. (Anxiety on full tab).  Ozempic 0.5mg./week-was on it for wt.loss and too stop food noise -loved it but HP ins. Cut her off it -none since mid march 2024.  Patient reports no current medication side effects. When on Ozempic loose stools and heartburn)  Nutrition/Eating Habits:   Exercise/Activity: pickleball if ankle not injured.  Medication History:  Phentermine:    Ozempic: 0.5mg/week worked well  when      Wt Readings from Last 4 Encounters:   07/03/24 151 lb (68.5 kg)   05/31/24 151 lb (68.5 kg)   05/19/24 150 lb (68 kg)   05/16/24 151 lb 9.6 oz (68.8 kg)     Estimated body mass index is 25.92 kg/m  as calculated from the following:    Height as of 5/19/24: 5' 4\" (1.626 m).    Weight as of 7/3/24: 151 lb (68.5 kg).      BP Readings from Last 3 Encounters:   07/03/24 122/80   05/31/24 118/80   05/19/24 114/79     ------------------------------------------------------------------------------------------------------------------    I spent 15 minutes with this patient today. All changes were made via collaborative practice agreement with Perico Gaston MD. A copy of the visit note was provided to the patient's " provider(s).    A summary of these recommendations was sent via [employer].    Abby Srinivasan [facility].  Medication Therapy Management Provider  [phone]      Telemedicine Visit Details  Type of service:  Telephone visit  Start Time: 9 AM  End Time: 9:15 AM     Medication Therapy Recommendations  Overweight (BMI 25.0-29.9)    Current Medication: COMPOUNDED NON-CONTROLLED SUBSTANCE (CMPD RX) - PHARMACY TO MIX COMPOUNDED MEDICATION   Rationale: Dose too low - Dosage too low - Effectiveness   Recommendation: Increase Dose - inject semaglutide 0.5mg /week dose now.   Status: Accepted per CPA

## 2024-07-31 ENCOUNTER — VIRTUAL VISIT (OUTPATIENT)
Dept: PHARMACY | Facility: CLINIC | Age: 39
End: 2024-07-31
Payer: COMMERCIAL

## 2024-07-31 DIAGNOSIS — E66.3 OVERWEIGHT (BMI 25.0-29.9): Primary | ICD-10-CM

## 2024-07-31 PROCEDURE — 99606 MTMS BY PHARM EST 15 MIN: CPT | Mod: 93 | Performed by: PHARMACIST

## 2024-07-31 NOTE — PATIENT INSTRUCTIONS
"Recommendations from today's MTM visit:                                                         1.  Lexy- begin Semaglutide 0.5mg. weekly dose via vial now --see below for syringe instructions on dosing. Keep excellent diet/exercise lifestyle pan.      How to Inject Compounded Weight Loss Medication- Semaglutide      How it is supplied:  You will receive 1ml or 2ml vial of medicine depending on your dose. You will also be sent syringes and needles for a month supply.    Storage:   Keep your medication stored in the fridge.   The vials are to be discarded 28 days after first use or expiration date, whichever is first (even if there is remaining medication).     Injecting:  Remove your vial from the refrigerator and allow to reach room temperature by sitting out on a counter/ table.  Using the syringes provided with the medication vial, withdraw the prescribed amount of medication to fill the syringe for your dose.             Follow the instructions to fill the syringe with medicine:       Follow the guide below to inject your medication:       [facility]: [phone]        Follow-up: Return in about 4 weeks (around 8/28/2024), or @ 11:30 AM via telephone, for Medication Therapy Management Visit, Weight loss.    It was great speaking with you today.  I value your experience and would be very thankful for your time in providing feedback in our clinic survey. In the next few days, you may receive an email or text message from [employer] with a link to a survey related to your  clinical pharmacist.\"     To schedule another MTM appointment, please call the clinic directly or you may call the MTM scheduling line at [phone] or toll-free at [phone].     My Clinical Pharmacist's contact information:                                                      Please feel free to contact me with any questions or concerns you have.      Abby Srinivasan [facility].  Medication Therapy Management " Provider  [phone]

## 2024-07-31 NOTE — Clinical Note
Perico--kevin--ezio doing well- good start with fv cmpd. Semaglutide --will increase dose now to 0.5mg./week --f/up in 4 weeks with Abigail

## 2024-08-07 ENCOUNTER — THERAPY VISIT (OUTPATIENT)
Dept: PHYSICAL THERAPY | Facility: CLINIC | Age: 39
End: 2024-08-07
Payer: COMMERCIAL

## 2024-08-07 DIAGNOSIS — M25.572 CHRONIC ANKLE PAIN, BILATERAL: Primary | ICD-10-CM

## 2024-08-07 DIAGNOSIS — M25.571 CHRONIC ANKLE PAIN, BILATERAL: Primary | ICD-10-CM

## 2024-08-07 DIAGNOSIS — G89.29 CHRONIC ANKLE PAIN, BILATERAL: Primary | ICD-10-CM

## 2024-08-07 DIAGNOSIS — M21.6X1 EQUINUS DEFORMITY OF BOTH FEET: ICD-10-CM

## 2024-08-07 DIAGNOSIS — M76.62 ACHILLES TENDONITIS, BILATERAL: ICD-10-CM

## 2024-08-07 DIAGNOSIS — M76.61 ACHILLES TENDONITIS, BILATERAL: ICD-10-CM

## 2024-08-07 DIAGNOSIS — M21.6X2 EQUINUS DEFORMITY OF BOTH FEET: ICD-10-CM

## 2024-08-07 PROCEDURE — 97033 APP MDLTY 1+IONTPHRSIS EA 15: CPT | Mod: GP | Performed by: PHYSICAL THERAPIST

## 2024-08-07 PROCEDURE — 97140 MANUAL THERAPY 1/> REGIONS: CPT | Mod: GP | Performed by: PHYSICAL THERAPIST

## 2024-08-22 ENCOUNTER — THERAPY VISIT (OUTPATIENT)
Dept: PHYSICAL THERAPY | Facility: CLINIC | Age: 39
End: 2024-08-22
Payer: COMMERCIAL

## 2024-08-22 DIAGNOSIS — M21.6X2 EQUINUS DEFORMITY OF BOTH FEET: ICD-10-CM

## 2024-08-22 DIAGNOSIS — M76.62 ACHILLES TENDONITIS, BILATERAL: ICD-10-CM

## 2024-08-22 DIAGNOSIS — G89.29 CHRONIC ANKLE PAIN, BILATERAL: Primary | ICD-10-CM

## 2024-08-22 DIAGNOSIS — M21.6X1 EQUINUS DEFORMITY OF BOTH FEET: ICD-10-CM

## 2024-08-22 DIAGNOSIS — M25.572 CHRONIC ANKLE PAIN, BILATERAL: Primary | ICD-10-CM

## 2024-08-22 DIAGNOSIS — M25.571 CHRONIC ANKLE PAIN, BILATERAL: Primary | ICD-10-CM

## 2024-08-22 DIAGNOSIS — M76.61 ACHILLES TENDONITIS, BILATERAL: ICD-10-CM

## 2024-08-22 PROCEDURE — 97110 THERAPEUTIC EXERCISES: CPT | Mod: GP | Performed by: PHYSICAL THERAPIST

## 2024-08-22 PROCEDURE — 20560 NDL INSJ W/O NJX 1 OR 2 MUSC: CPT | Performed by: PHYSICAL THERAPIST

## 2024-08-22 PROCEDURE — 97112 NEUROMUSCULAR REEDUCATION: CPT | Mod: GP | Performed by: PHYSICAL THERAPIST

## 2024-08-27 NOTE — PROGRESS NOTES
Medication Therapy Management (MTM) Encounter    ASSESSMENT:                            Medication Adherence/Access: No issues identified    Weight Management:   Patient would benefit from : increasing weekly Semaglutide dose now to 1.0mg. --and continue with lifestyle changes. See plan for self titration over next 8 weeks.       PLAN:                            1. Good news today --home weight 149.8lbs -but not in cycle 146.6lbs --Goal weight 130lbs.--  skip 1 week of the Semaglutide for elective procedure , then when feeling well afterwards inject 0.25mg./1 week --then as tolerated over next 8 weeks --increase dose to 0.5mg./week , then 0.75mg./week=15 units on syringe , then perhaps 1mg./week if tolerated.    Ok to use remaining drug after day 28 --I am unsure if it will retain potency or not ? Maybe try it and see what you think .    Keep excellent lifestyle changes with diet, exercise and daily Culturelle probiotic.       Follow-up: Return in about 9 weeks (around 10/30/2024), or @11 AM via telephone, for Medication Therapy Management Visit, Weight loss.      SUBJECTIVE/OBJECTIVE:                          Lexy Sinha is a 39 year old female called for a follow-up (7-31-24 )visit. She was referred to me from Perico Gaston  .      Reason for visit:   Fv cmpd semaglutide f/up        Allergies/ADRs: Reviewed in chart  Past Medical History: Reviewed in chart  Tobacco: She reports that she has never smoked. She has never been exposed to tobacco smoke. She has never used smokeless tobacco.  Alcohol: Less than 1 beverages / week  E-cigarette Use: none , no MJ use.  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  Personal Healthcare Goals: 130lbs.--goal wt.before covid, was on ozempic then.  Activity: pickleball but injures her ankle , take it easy right now.      Medication Adherence/Access: no issues  "reported    Weight Management (has OCD as well):  Semaglutide 0.5mg./week-- home weight today = 149.8lbs but 8-21-24 146.6lbs --she is having period right now.  Will pause med x 1 week for elective surgery.  Takes 1 tab culturelle daily --much easier on GI system(95% better).      Previously :   Semaglutide 0.25mg./week-home weight 148lbs.-- today will receive semaglutide 0.5mg ./week dose --she injects on Thursdays.   Tiny bit of nausea/loose stool --very manageable.  Eats proteins first, lots of water daily.  Exercise--lots of pickleball.      Off all phentermine now. 7-5-24.    Previously :  Phentermine 37.5 mg -1/2 tab /day all she could tolerate. (Anxiety on full tab).  Ozempic 0.5mg./week-was on it for wt.loss and too stop food noise -loved it but HP ins. Cut her off it -none since mid march 2024.  Patient reports no current medication side effects. When on Ozempic loose stools and heartburn)  Nutrition/Eating Habits:   Exercise/Activity: pickleball if ankle not injured.  Medication History:  Phentermine:    Ozempic: 0.5mg/week worked well  when      Wt Readings from Last 4 Encounters:   07/03/24 151 lb (68.5 kg)   05/31/24 151 lb (68.5 kg)   05/19/24 150 lb (68 kg)   05/16/24 151 lb 9.6 oz (68.8 kg)     Estimated body mass index is 25.92 kg/m  as calculated from the following:    Height as of 5/19/24: 5' 4\" (1.626 m).    Weight as of 7/3/24: 151 lb (68.5 kg).      BP Readings from Last 3 Encounters:   07/03/24 122/80   05/31/24 118/80   05/19/24 114/79     ------------------------------------------------------------------------------------------------------------------    I spent 15 minutes with this patient today. All changes were made via collaborative practice agreement with Perico Gaston MD. A copy of the visit note was provided to the patient's provider(s).    A summary of these recommendations was sent via [employer].    Abby Srinivasan [facility].  Medication Therapy Management " Provider  [phone]      Telemedicine Visit Details  Type of service:  Telephone visit  Start Time: 11:30 AM  End Time: 11:45 AM     Medication Therapy Recommendations  Overweight (BMI 25.0-29.9)    Current Medication: COMPOUNDED NON-CONTROLLED SUBSTANCE (CMPD RX) - PHARMACY TO MIX COMPOUNDED MEDICATION (Discontinued)   Rationale: Dose too low - Dosage too low - Effectiveness   Recommendation: Increase Dose - COMPOUNDED NON-CONTROLLED SUBSTANCE - PHARMACY TO MIX COMPOUNDED MEDICATION - see plan for self titration to 0.5mg-1mg./week over next 8 weeks.   Status: Accepted per CPA

## 2024-08-28 ENCOUNTER — VIRTUAL VISIT (OUTPATIENT)
Dept: PHARMACY | Facility: CLINIC | Age: 39
End: 2024-08-28
Payer: COMMERCIAL

## 2024-08-28 DIAGNOSIS — E66.3 OVERWEIGHT (BMI 25.0-29.9): Primary | ICD-10-CM

## 2024-08-28 PROCEDURE — 99606 MTMS BY PHARM EST 15 MIN: CPT | Mod: 93 | Performed by: PHARMACIST

## 2024-08-28 NOTE — Clinical Note
Perico--ezio off to good start with semaglutide --will slowly increase dose over next 2 months --f/up end of October -thxpilar
MOLST Discussed/Hospice Referral/Holistic/Prognosis/Treatment Options

## 2024-08-28 NOTE — PATIENT INSTRUCTIONS
"Recommendations from today's MTM visit:                                                         1. Good news today --home weight 149.8lbs -but not in cycle 146.6lbs --Goal weight 130lbs.--  skip 1 week of the Semaglutide for elective procedure , then when feeling well afterwards inject 0.25mg./1 week --then as tolerated over next 8 weeks --increase dose to 0.5mg./week , then 0.75mg./week=15 units on syringe , then perhaps 1mg./week if tolerated.    Ok to use remaining drug after day 28 --I am unsure if it will retain potency or not ? Maybe try it and see what you think .    Keep excellent lifestyle changes with diet, exercise and daily Culturelle probiotic.       Follow-up: Return in about 9 weeks (around 10/30/2024), or @11 AM via telephone, for Medication Therapy Management Visit, Weight loss.    It was great speaking with you today.  I value your experience and would be very thankful for your time in providing feedback in our clinic survey. In the next few days, you may receive an email or text message from [employer] with a link to a survey related to your  clinical pharmacist.\"     To schedule another MTM appointment, please call the clinic directly or you may call the MTM scheduling line at [phone] or toll-free at [phone].     My Clinical Pharmacist's contact information:                                                      Please feel free to contact me with any questions or concerns you have.      Abby Srinivasan Rp.  Medication Therapy Management Provider  [phone]    "

## 2024-08-30 ENCOUNTER — OFFICE VISIT (OUTPATIENT)
Dept: FAMILY MEDICINE | Facility: CLINIC | Age: 39
End: 2024-08-30
Payer: COMMERCIAL

## 2024-08-30 VITALS
TEMPERATURE: 97.5 F | RESPIRATION RATE: 16 BRPM | HEART RATE: 60 BPM | OXYGEN SATURATION: 100 % | DIASTOLIC BLOOD PRESSURE: 82 MMHG | BODY MASS INDEX: 25.61 KG/M2 | HEIGHT: 64 IN | SYSTOLIC BLOOD PRESSURE: 120 MMHG | WEIGHT: 150 LBS

## 2024-08-30 DIAGNOSIS — I10 BENIGN ESSENTIAL HYPERTENSION: ICD-10-CM

## 2024-08-30 DIAGNOSIS — L70.0 ACNE VULGARIS: ICD-10-CM

## 2024-08-30 DIAGNOSIS — Z01.818 PREOP GENERAL PHYSICAL EXAM: Primary | ICD-10-CM

## 2024-08-30 LAB
ANION GAP SERPL CALCULATED.3IONS-SCNC: 9 MMOL/L (ref 7–15)
BUN SERPL-MCNC: 17.6 MG/DL (ref 6–20)
CALCIUM SERPL-MCNC: 9.4 MG/DL (ref 8.8–10.4)
CHLORIDE SERPL-SCNC: 105 MMOL/L (ref 98–107)
CREAT SERPL-MCNC: 0.97 MG/DL (ref 0.51–0.95)
EGFRCR SERPLBLD CKD-EPI 2021: 75 ML/MIN/1.73M2
GLUCOSE SERPL-MCNC: 89 MG/DL (ref 70–99)
HCO3 SERPL-SCNC: 24 MMOL/L (ref 22–29)
HGB BLD-MCNC: 15.4 G/DL (ref 11.7–15.7)
POTASSIUM SERPL-SCNC: 4.5 MMOL/L (ref 3.4–5.3)
SODIUM SERPL-SCNC: 138 MMOL/L (ref 135–145)

## 2024-08-30 PROCEDURE — 85018 HEMOGLOBIN: CPT | Performed by: FAMILY MEDICINE

## 2024-08-30 PROCEDURE — 99214 OFFICE O/P EST MOD 30 MIN: CPT | Performed by: FAMILY MEDICINE

## 2024-08-30 PROCEDURE — 80048 BASIC METABOLIC PNL TOTAL CA: CPT | Performed by: FAMILY MEDICINE

## 2024-08-30 PROCEDURE — 36415 COLL VENOUS BLD VENIPUNCTURE: CPT | Performed by: FAMILY MEDICINE

## 2024-08-30 NOTE — PATIENT INSTRUCTIONS

## 2024-08-30 NOTE — PROGRESS NOTES
Preoperative Evaluation  [facility]  [address]  [place] [address]  Phone: [phone]  Primary Provider: Perico Gaston MD  Pre-op Performing Provider: IAIN CALVO DO  Aug 30, 2024             8/25/2024   Surgical Information   What procedure is being done? Chin liposuction   Facility or Hospital where procedure/surgery will be performed: [facility]   Who is doing the procedure / surgery? Dr. Jay Pool   Date of surgery / procedure: 9/10/24   Time of surgery / procedure: Unknown   Where do you plan to recover after surgery? at home with family        Fax number for surgical facility: [phone]    Assessment & Plan     The proposed surgical procedure is considered LOW risk.    Problem List Items Addressed This Visit    None  Visit Diagnoses       Preop general physical exam    -  Primary    Relevant Orders    Hemoglobin    Acne vulgaris        Benign essential hypertension        Relevant Orders    Basic metabolic panel  (Ca, Cl, CO2, Creat, Gluc, K, Na, BUN)                    - No identified additional risk factors other than previously addressed    Preoperative Medication Instructions  Antiplatelet or Anticoagulation Medication Instructions   - Patient is on no antiplatelet or anticoagulation medications.    Additional Medication Instructions  Take all scheduled medications on the day of surgery EXCEPT for modifications listed below:   - Diuretics: DO NOT TAKE on the day of surgery. (Spironolactone)    Recommendation  Approval given to proceed with proposed procedure, without further diagnostic evaluation.    Ella Pugh is a 39 year old, presenting for the following:  Pre-Op Exam          8/30/2024    10:57 AM   Additional Questions   Roomed by SHIVA Olson related to upcoming procedure: chin liposuction elective        8/25/2024   Pre-Op Questionnaire   Have you ever had a heart attack or stroke? No   Have you ever had surgery  on your heart or blood vessels, such as a stent placement, a coronary artery bypass, or surgery on an artery in your head, neck, heart, or legs? No   Do you have chest pain with activity? No   Do you have a history of heart failure? No   Do you currently have a cold, bronchitis or symptoms of other infection? No   Do you have a cough, shortness of breath, or wheezing? No   Do you or anyone in your family have previous history of blood clots? No   Do you or does anyone in your family have a serious bleeding problem such as prolonged bleeding following surgeries or cuts? No   Have you ever had problems with anemia or been told to take iron pills? No   Have you had any abnormal blood loss such as black, tarry or bloody stools, or abnormal vaginal bleeding? No   Have you ever had a blood transfusion? No   Are you willing to have a blood transfusion if it is medically needed before, during, or after your surgery? Yes   Have you or any of your relatives ever had problems with anesthesia? No   Do you have sleep apnea, excessive snoring or daytime drowsiness? No   Do you have any artifical heart valves or other implanted medical devices like a pacemaker, defibrillator, or continuous glucose monitor? No   Do you have artificial joints? No   Are you allergic to latex? No        Health Care Directive  Patient does not have a Health Care Directive or Living Will: Discussed advance care planning with patient; information given to patient to review.    Preoperative Review of    reviewed - no record of controlled substances prescribed.          Patient Active Problem List    Diagnosis Date Noted    Achilles tendinitis of both lower extremities 07/19/2023     Priority: Medium    Ureteral stone 11/24/2020     Priority: Medium     Formatting of this note might be different from the original. Added automatically from request for surgery [id number]      Nephrolithiasis 02/17/2016     Priority: Medium    Dysmenorrhea 11/13/2012      "Priority: Medium    Vaginismus 11/13/2012     Priority: Medium      Past Medical History:   Diagnosis Date    Acne     Hypertension      Past Surgical History:   Procedure Laterality Date    PARTIAL HYMENECTOMY       Current Outpatient Medications   Medication Sig Dispense Refill    clindamycin (CLEOCIN T) 1 % external solution Apply topically 2 times daily 60 mL 1    COMPOUNDED NON-CONTROLLED SUBSTANCE (CMPD RX) - PHARMACY TO MIX COMPOUNDED MEDICATION Compound Semaglutide 1.0mg. sub-q weekly injection. 1 mL 3    dexAMETHasone (DECADRON) 4 MG/ML injection Apply 1 mL (4 mg) topically See Admin Instructions For physical therapy, iontophoresis 30 mL 0    ketorolac (TORADOL) 10 MG tablet Take 1 tablet (10 mg) by mouth every 6 hours as needed for moderate pain 30 tablet 5    spironolactone (ALDACTONE) 25 MG tablet Take 1 tablet (25 mg) by mouth 2 times daily for 360 days 180 tablet 3    tiZANidine (ZANAFLEX) 2 MG tablet Take 1 tablet (2 mg) by mouth 3 times daily 30 tablet 3       Allergies   Allergen Reactions    Doxycycline Rash    Nickel Rash        Social History     Tobacco Use    Smoking status: Never     Passive exposure: Never    Smokeless tobacco: Never   Substance Use Topics    Alcohol use: Yes     Comment: occ       History   Drug Use No             Review of Systems  Constitutional, HEENT, cardiovascular, pulmonary, gi and gu systems are negative, except as otherwise noted.    Objective    /82 (BP Location: Right arm, Patient Position: Chair, Cuff Size: Adult Regular)   Pulse 60   Temp 97.5  F (36.4  C) (Temporal)   Resp 16   Ht 1.626 m (5' 4\")   Wt 68 kg (150 lb)   LMP 08/23/2024 (Exact Date)   SpO2 100%   BMI 25.75 kg/m     Estimated body mass index is 25.75 kg/m  as calculated from the following:    Height as of this encounter: 1.626 m (5' 4\").    Weight as of this encounter: 68 kg (150 lb).  Physical Exam  GENERAL: alert and no distress  HENT: ear canals and TM's normal, nose and mouth " without ulcers or lesions  NECK: no adenopathy, no asymmetry, masses, or scars  RESP: lungs clear to auscultation - no rales, rhonchi or wheezes  CV: regular rate and rhythm, normal S1 S2, no S3 or S4, no murmur, click or rub, no peripheral edema  ABDOMEN: soft, nontender, no hepatosplenomegaly, no masses and bowel sounds normal  MS: no gross musculoskeletal defects noted, no edema    Recent Labs   Lab Test 07/18/24  1030 11/15/23  1128 09/15/23  1005   HGB 15.3  --   --      --   --    INR 1.09  --   --    NA  --  138 136   POTASSIUM  --  4.7 4.7   CR  --  0.93 0.91   A1C  --   --  5.1        Diagnostics  Labs pending at this time.  Results will be reviewed when available.   No EKG this visit, completed in the last 180 days.    Revised Cardiac Risk Index (RCRI)  The patient has the following serious cardiovascular risks for perioperative complications:   - No serious cardiac risks = 0 points     RCRI Interpretation: 0 points: Class I (very low risk - 0.4% complication rate)         Signed Electronically by: IAIN CALVO DO  A copy of this evaluation report is provided to the requesting physician.

## 2024-09-18 ENCOUNTER — THERAPY VISIT (OUTPATIENT)
Dept: PHYSICAL THERAPY | Facility: CLINIC | Age: 39
End: 2024-09-18
Payer: COMMERCIAL

## 2024-09-18 DIAGNOSIS — M76.61 ACHILLES TENDONITIS, BILATERAL: ICD-10-CM

## 2024-09-18 DIAGNOSIS — M25.571 CHRONIC ANKLE PAIN, BILATERAL: Primary | ICD-10-CM

## 2024-09-18 DIAGNOSIS — G89.29 CHRONIC ANKLE PAIN, BILATERAL: Primary | ICD-10-CM

## 2024-09-18 DIAGNOSIS — M21.6X2 EQUINUS DEFORMITY OF BOTH FEET: ICD-10-CM

## 2024-09-18 DIAGNOSIS — M25.572 CHRONIC ANKLE PAIN, BILATERAL: Primary | ICD-10-CM

## 2024-09-18 DIAGNOSIS — M76.62 ACHILLES TENDONITIS, BILATERAL: ICD-10-CM

## 2024-09-18 DIAGNOSIS — M21.6X1 EQUINUS DEFORMITY OF BOTH FEET: ICD-10-CM

## 2024-09-18 PROCEDURE — 97110 THERAPEUTIC EXERCISES: CPT | Mod: GP | Performed by: PHYSICAL THERAPIST

## 2024-09-18 PROCEDURE — 97033 APP MDLTY 1+IONTPHRSIS EA 15: CPT | Mod: GP | Performed by: PHYSICAL THERAPIST

## 2024-09-18 PROCEDURE — 97140 MANUAL THERAPY 1/> REGIONS: CPT | Mod: GP | Performed by: PHYSICAL THERAPIST

## 2024-09-26 ENCOUNTER — TRANSFERRED RECORDS (OUTPATIENT)
Dept: HEALTH INFORMATION MANAGEMENT | Facility: CLINIC | Age: 39
End: 2024-09-26
Payer: COMMERCIAL

## 2024-10-04 ENCOUNTER — THERAPY VISIT (OUTPATIENT)
Dept: PHYSICAL THERAPY | Facility: CLINIC | Age: 39
End: 2024-10-04
Payer: COMMERCIAL

## 2024-10-04 DIAGNOSIS — G89.29 CHRONIC ANKLE PAIN, BILATERAL: Primary | ICD-10-CM

## 2024-10-04 DIAGNOSIS — M21.6X2 EQUINUS DEFORMITY OF BOTH FEET: ICD-10-CM

## 2024-10-04 DIAGNOSIS — M25.572 CHRONIC ANKLE PAIN, BILATERAL: Primary | ICD-10-CM

## 2024-10-04 DIAGNOSIS — M76.61 ACHILLES TENDONITIS, BILATERAL: ICD-10-CM

## 2024-10-04 DIAGNOSIS — M21.6X1 EQUINUS DEFORMITY OF BOTH FEET: ICD-10-CM

## 2024-10-04 DIAGNOSIS — M25.571 CHRONIC ANKLE PAIN, BILATERAL: Primary | ICD-10-CM

## 2024-10-04 DIAGNOSIS — M76.62 ACHILLES TENDONITIS, BILATERAL: ICD-10-CM

## 2024-10-04 PROCEDURE — 97140 MANUAL THERAPY 1/> REGIONS: CPT | Mod: GP | Performed by: PHYSICAL THERAPIST

## 2024-10-04 PROCEDURE — 97033 APP MDLTY 1+IONTPHRSIS EA 15: CPT | Mod: GP | Performed by: PHYSICAL THERAPIST

## 2024-10-15 ENCOUNTER — IMMUNIZATION (OUTPATIENT)
Dept: FAMILY MEDICINE | Facility: CLINIC | Age: 39
End: 2024-10-15
Payer: COMMERCIAL

## 2024-10-15 VITALS — TEMPERATURE: 98.3 F

## 2024-10-15 PROCEDURE — 90480 ADMN SARSCOV2 VAC 1/ONLY CMP: CPT

## 2024-10-15 PROCEDURE — 91320 SARSCV2 VAC 30MCG TRS-SUC IM: CPT

## 2024-10-16 ENCOUNTER — MYC MEDICAL ADVICE (OUTPATIENT)
Dept: PHARMACY | Facility: CLINIC | Age: 39
End: 2024-10-16

## 2024-10-16 DIAGNOSIS — E66.3 OVERWEIGHT (BMI 25.0-29.9): Primary | ICD-10-CM

## 2024-10-16 RX ORDER — SYRINGE-NEEDLE,INSULIN,0.5 ML 27GX1/2"
SYRINGE, EMPTY DISPOSABLE MISCELLANEOUS
Qty: 10 EACH | Refills: 3 | Status: SHIPPED | OUTPATIENT
Start: 2024-10-16

## 2024-10-16 NOTE — TELEPHONE ENCOUNTER
Hi -     I am out of injection needles. Are you able to send a prescription for them in to:      [facility]  [address], [address], [place] [address]  Phone: [phone]  Fax: [phone]     Or let me know where to get the same ones that the compounding pharmacy was sending me?     Thank you!  Lexy Spencer will send syringe rx to  specialty/mail order [facility] .    Abby Srinivasan [facility].  Medication Therapy Management Provider  [phone]      How to Inject Compounded Weight Loss Medication- Semaglutide      How it is supplied:  You will receive 1ml or 2ml vial of medicine depending on your dose. You will also be sent syringes and needles for a month supply.    Storage:   Keep your medication stored in the fridge.   The vials are to be discarded 28 days after first use or expiration date, whichever is first (even if there is remaining medication).     Injecting:  Remove your vial from the refrigerator and allow to reach room temperature by sitting out on a counter/ table.  Using the syringes provided with the medication vial, withdraw the prescribed amount of medication to fill the syringe for your dose.

## 2024-10-27 NOTE — PROGRESS NOTES
Medication Therapy Management (MTM) Encounter    ASSESSMENT:                            Medication Adherence/Access: No issues identified    Weight Management:   Patient would benefit from : ok to stay at 1mg./semaglutide next 2 weeks then off x 1-2 weeks for elective surgery , then restart --see plan start low and increase dose weekly to highest tolerated dose that will help you lose weight and reach your goa.       PLAN:                            1. Bummer surgery was postponed and you had to go off the drug for a few weeks --good news is you didn't gain any weight --home weight 150lbs -Goal weight 130lbs.--  skip 1-2 weeks of the Semaglutide for elective procedure in November, then when feeling well afterwards inject 0.25mg./1 week --then as tolerated over next 8 weeks --increase dose to 0.5mg./week , then 0.75mg./week=15 units on syringe , then perhaps 1mg./week if tolerated, then 1.5mg. if needed.etc.     Keep excellent lifestyle changes with diet, exercise and daily Culturelle probiotic. Look for a gym-start strength training to preserve muscle mass.    How it is supplied:  You will receive 1ml (230$)or 2ml (370$) vial of medicine depending on your dose. You will also be sent syringes and needles for a month supply.  Let me know if you need a refill of either size vial before our next meeting.    Storage:   Keep your medication stored in the fridge.       Injecting:  Remove your vial from the refrigerator and allow to reach room temperature by sitting out on a counter/ table.  Using the syringes provided with the medication vial, withdraw the prescribed amount of medication to fill the syringe for your dose.           Follow-up: Return in about 13 weeks (around 1/29/2025), or @ 11 AM via telephone, for Medication Therapy Management Visit, Weight loss.        SUBJECTIVE/OBJECTIVE:                          Lexy Sinha is a 39 year old female called for a follow-up (8-28-24 )visit. She was referred to me  from Perico Gaston      Reason for visit:   Fv cmpd semaglutide f/up    Surgery now moved to [facility].      Allergies/ADRs: Reviewed in chart  Past Medical History: Reviewed in chart  Tobacco: She reports that she has never smoked. She has never been exposed to tobacco smoke. She has never used smokeless tobacco.  Alcohol: Less than 1 beverages / week  E-cigarette Use: none , no MJ use.  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  Personal Healthcare Goals: 130lbs.--goal wt.before covid, was on ozempic then.  Activity: pickleball but injures her ankle , take it easy right now.      Medication Adherence/Access: no issues reported    Weight Management (has OCD as well):  Semaglutide 1mg./week- doing well -but not losing because off x 2 weeks expecting surgery   Home weight today =150lbs.   Eats proteins first, lots of water daily.  Exercise--lots of pickleball. No strength training yet. Looking for a gym that is close to her.    Previously :   Semaglutide 0.5mg./week-- home weight today = 149.8lbs but 8-21-24 146.6lbs --she is having period right now.  Will pause med x 1 week for elective surgery.  Takes 1 tab culturelle daily --much easier on GI system(95% better).      Previously :   Semaglutide 0.25mg./week-home weight 148lbs.-- today will receive semaglutide 0.5mg ./week dose --she injects on Thursdays.   Tiny bit of nausea/loose stool --very manageable.  Eats proteins first, lots of water daily.  Exercise--lots of pickleball.      Off all phentermine now. 7-5-24.    Previously :  Phentermine 37.5 mg -1/2 tab /day all she could tolerate. (Anxiety on full tab).  Ozempic 0.5mg./week-was on it for wt.loss and too stop food noise -loved it but HP ins. Cut her off it -none since mid march 2024.  Patient reports no current medication side effects. When on Ozempic loose stools and  "heartburn)  Nutrition/Eating Habits:   Exercise/Activity: pickleball if ankle not injured.  Medication History:  Phentermine:    Ozempic: 0.5mg/week worked well  when      Wt Readings from Last 4 Encounters:   08/30/24 150 lb (68 kg)   07/03/24 151 lb (68.5 kg)   05/31/24 151 lb (68.5 kg)   05/19/24 150 lb (68 kg)     Estimated body mass index is 25.75 kg/m  as calculated from the following:    Height as of 8/30/24: 5' 4\" (1.626 m).    Weight as of 8/30/24: 150 lb (68 kg).      BP Readings from Last 3 Encounters:   08/30/24 120/82   07/03/24 122/80   05/31/24 118/80     ------------------------------------------------------------------------------------------------------------------      I spent 15 minutes with this patient today. All changes were made via collaborative practice agreement with Perico Gaston MD. A copy of the visit note was provided to the patient's provider(s).    A summary of these recommendations was sent via [employer].    Abby Srinivasan [facility].  Medication Therapy Management Provider  [phone]      Telemedicine Visit Details  The patient's medications can be safely assessed via a telemedicine encounter.  Type of service:  Telephone visit  Originating Location (pt. Location): Home    Distant Location (provider location):  Off-site  Start Time: 11:00 AM  End Time: 11:15AM     Medication Therapy Recommendations  Overweight (BMI 25.0-29.9)   1 Current Medication: COMPOUNDED NON-CONTROLLED SUBSTANCE (CMPD RX) - PHARMACY TO MIX COMPOUNDED MEDICATION   Current Medication Sig: Compound Semaglutide 1.0mg. sub-q weekly injection.   Rationale: Dose too low - Dosage too low - Effectiveness   Recommendation: Increase Dose - post surgery keep weekly dose increase see if you can get to 1.5mg./week .   Status: Accepted per CPA   Identified Date: 10/30/2024 Completed Date: 10/30/2024                       "

## 2024-10-30 ENCOUNTER — VIRTUAL VISIT (OUTPATIENT)
Dept: PHARMACY | Facility: CLINIC | Age: 39
End: 2024-10-30
Payer: COMMERCIAL

## 2024-10-30 DIAGNOSIS — E66.3 OVERWEIGHT (BMI 25.0-29.9): Primary | ICD-10-CM

## 2024-10-30 PROCEDURE — 99606 MTMS BY PHARM EST 15 MIN: CPT | Mod: 93 | Performed by: PHARMACIST

## 2024-10-30 NOTE — PATIENT INSTRUCTIONS
"Recommendations from today's MTM visit:                                                       1. Bummer surgery was postponed and you had to go off the drug for a few weeks --good news is you didn't gain any weight --home weight 150lbs -Goal weight 130lbs.--  skip 1-2 weeks of the Semaglutide for elective procedure in November, then when feeling well afterwards inject 0.25mg./1 week --then as tolerated over next 8 weeks --increase dose to 0.5mg./week , then 0.75mg./week=15 units on syringe , then perhaps 1mg./week if tolerated, then 1.5mg. if needed.etc.     Keep excellent lifestyle changes with diet, exercise and daily Culturelle probiotic. Look for a gym-start strength training to preserve muscle mass.    How it is supplied:  You will receive 1ml (230$)or 2ml (370$) vial of medicine depending on your dose. You will also be sent syringes and needles for a month supply.  Let me know if you need a refill of either size vial before our next meeting.    Storage:   Keep your medication stored in the fridge.       Injecting:  Remove your vial from the refrigerator and allow to reach room temperature by sitting out on a counter/ table.  Using the syringes provided with the medication vial, withdraw the prescribed amount of medication to fill the syringe for your dose.           Follow-up: Return in about 13 weeks (around 1/29/2025), or @ 11 AM via telephone, for Medication Therapy Management Visit, Weight loss.    It was great speaking with you today.  I value your experience and would be very thankful for your time in providing feedback in our clinic survey. In the next few days, you may receive an email or text message from [employer] with a link to a survey related to your  clinical pharmacist.\"     To schedule another MTM appointment, please call the clinic directly or you may call the MTM scheduling line at [phone] or toll-free at [phone].     My Clinical Pharmacist's contact information:                  "                                     Please feel free to contact me with any questions or concerns you have.      Abby Srinivasan Rph.  Medication Therapy Management Provider  [phone]

## 2024-10-30 NOTE — Clinical Note
Perico--ezio weight plateaued as she went off drug for surgery now surgery postponed late November--I gave her paramaters on what to do before and after --then 3 months recheck with me --arnol Melendez

## 2024-11-06 ENCOUNTER — VIRTUAL VISIT (OUTPATIENT)
Dept: FAMILY MEDICINE | Facility: CLINIC | Age: 39
End: 2024-11-06
Payer: COMMERCIAL

## 2024-11-06 DIAGNOSIS — L70.9 ACNE, UNSPECIFIED ACNE TYPE: ICD-10-CM

## 2024-11-06 DIAGNOSIS — I10 PRIMARY HYPERTENSION: ICD-10-CM

## 2024-11-06 DIAGNOSIS — H18.833 RECURRENT EROSION OF BOTH CORNEAS: Primary | ICD-10-CM

## 2024-11-06 PROCEDURE — 99213 OFFICE O/P EST LOW 20 MIN: CPT | Mod: 95 | Performed by: FAMILY MEDICINE

## 2024-11-06 PROCEDURE — G2211 COMPLEX E/M VISIT ADD ON: HCPCS | Mod: 95 | Performed by: FAMILY MEDICINE

## 2024-11-06 RX ORDER — SPIRONOLACTONE 25 MG/1
25 TABLET ORAL DAILY
Status: SHIPPED
Start: 2024-11-06

## 2024-11-06 NOTE — PROGRESS NOTES
"Lexy is a 39 year old who is being evaluated via a billable video visit.          Assessment & Plan     Recurrent erosion of both corneas  - Adult Eye  Referral; Future    Primary hypertension  - Home BP stable   - spironolactone (ALDACTONE) 25 MG tablet; Take 1 tablet (25 mg) by mouth daily.    Acne, unspecified acne type  - spironolactone (ALDACTONE) 25 MG tablet; Take 1 tablet (25 mg) by mouth daily.    The longitudinal plan of care for the diagnosis(es)/condition(s) as documented were addressed during this visit. Due to the added complexity in care, I will continue to support Lexy in the subsequent management and with ongoing continuity of care.       BMI  Estimated body mass index is 25.75 kg/m  as calculated from the following:    Height as of 8/30/24: 1.626 m (5' 4\").    Weight as of 8/30/24: 68 kg (150 lb).             Subjective   Lexy is a 39 year old, presenting for the following health issues:  No chief complaint on file.    HPI       Few months thought she got something stuck in her eye. She went to a few providers and saw provider in [place]. She was diagnosed with corneal problem. Her skin is much drier since being on spironolactone. She cut her hair off since it was being dry. She was wondering if her eye symptoms were connected to spironolactone. She then reduced her dose to 25 mg and has noted slight difference. Her BP has been slightly elevated. Her last few readings 117/83, 110/84, 117/88, 116/89               Objective           Vitals:  No vitals were obtained today due to virtual visit.    Physical Exam   GENERAL: alert and no distress  EYES: Eyes grossly normal to inspection.  No discharge or erythema, or obvious scleral/conjunctival abnormalities.  RESP: No audible wheeze, cough, or visible cyanosis.    SKIN: Visible skin clear. No significant rash, abnormal pigmentation or lesions.  NEURO: Cranial nerves grossly intact.  Mentation and speech appropriate for age.  PSYCH: " Appropriate affect, tone, and pace of words          Video-Visit Details    Type of service:  Video Visit   Originating Location (pt. Location): Home    Distant Location (provider location):  On-site  Platform used for Video Visit: [facility]  Signed Electronically by: Perico Gaston MD

## 2024-11-06 NOTE — PATIENT INSTRUCTIONS
Eye referral -     Comment: Please be aware that coverage of these services is subject to the terms and limitations of your health insurance plan.  Call member services at your health plan with any benefit or coverage questions.[facility] will call you to coordinate your care as prescribed by your provider. If you don't hear from a representative within 2 business days, please call [phone].

## 2024-11-14 ENCOUNTER — MYC MEDICAL ADVICE (OUTPATIENT)
Dept: FAMILY MEDICINE | Facility: CLINIC | Age: 39
End: 2024-11-14
Payer: COMMERCIAL

## 2024-11-14 DIAGNOSIS — I10 PRIMARY HYPERTENSION: Primary | ICD-10-CM

## 2024-11-15 RX ORDER — LOSARTAN POTASSIUM 25 MG/1
25 TABLET ORAL DAILY
Status: CANCELLED | OUTPATIENT
Start: 2024-11-15

## 2024-11-15 NOTE — TELEPHONE ENCOUNTER
Dr. Gaston: recent value entered     Since lowering my Spironolactone dose (now doing 25mg 1x/day) my blood pressure is going up. The last two readings were 125/98 and 117/96. You told me to let you know if it went over 90.    Brittani FULLER, BSN, PHN, RN-[facility]  [phone]

## 2024-11-19 RX ORDER — LISINOPRIL 10 MG/1
10 TABLET ORAL DAILY
Qty: 90 TABLET | Refills: 0 | Status: SHIPPED | OUTPATIENT
Start: 2024-11-19

## 2024-11-20 DIAGNOSIS — Z01.812 PRE-OPERATIVE LABORATORY EXAMINATION: Primary | ICD-10-CM

## 2024-11-20 NOTE — TELEPHONE ENCOUNTER
Interaction between spironolactone and losartan  I sent in prescription for lisinopril 10 mg daily   DM

## 2024-11-20 NOTE — TELEPHONE ENCOUNTER
Writer responded via [email or web address].  Brittani FULLER, BSN, PHN, RN-[facility]  [phone]

## 2024-11-30 ENCOUNTER — HEALTH MAINTENANCE LETTER (OUTPATIENT)
Age: 39
End: 2024-11-30

## 2024-11-30 DIAGNOSIS — I10 PRIMARY HYPERTENSION: ICD-10-CM

## 2024-11-30 DIAGNOSIS — L70.9 ACNE, UNSPECIFIED ACNE TYPE: ICD-10-CM

## 2024-12-04 RX ORDER — SPIRONOLACTONE 25 MG/1
TABLET ORAL
Qty: 180 TABLET | Refills: 3 | OUTPATIENT
Start: 2024-12-04

## 2024-12-19 ENCOUNTER — VIRTUAL VISIT (OUTPATIENT)
Dept: FAMILY MEDICINE | Facility: CLINIC | Age: 39
End: 2024-12-19
Payer: COMMERCIAL

## 2024-12-19 DIAGNOSIS — I10 PRIMARY HYPERTENSION: ICD-10-CM

## 2024-12-19 RX ORDER — LISINOPRIL 10 MG/1
10 TABLET ORAL DAILY
Qty: 90 TABLET | Refills: 3 | Status: SHIPPED | OUTPATIENT
Start: 2024-12-19

## 2024-12-19 NOTE — PROGRESS NOTES
"Lexy is a 39 year old who is being evaluated via a billable video visit.    How would you like to obtain your AVS? MyChart  If the video visit is dropped, the invitation should be resent by: Send to e-mail at: [email or web address]  Will anyone else be joining your video visit? No      Assessment & Plan     Primary hypertension  - Home BP stable, continue current regimen  - Repeat BMP - will schedule lab only visit   - Basic metabolic panel  (Ca, Cl, CO2, Creat, Gluc, K, Na, BUN); Future  - lisinopril (ZESTRIL) 10 MG tablet; Take 1 tablet (10 mg) by mouth daily.  - PRIMARY CARE FOLLOW-UP SCHEDULING; Future      The longitudinal plan of care for the diagnosis(es)/condition(s) as documented were addressed during this visit. Due to the added complexity in care, I will continue to support Lexy in the subsequent management and with ongoing continuity of care.     BMI  Estimated body mass index is 25.75 kg/m  as calculated from the following:    Height as of 8/30/24: 1.626 m (5' 4\").    Weight as of 8/30/24: 68 kg (150 lb).             Subjective   Lexy is a 39 year old, presenting for the following health issues:  RECHECK (Blood pressure)        12/19/2024    11:00 AM   Additional Questions   Roomed by Delores HERNANDEZ     History of Present Illness       Hypertension: She presents for follow up of hypertension.  She does check blood pressure  regularly outside of the clinic. Outside blood pressures have been over 140/90. She does not follow a low salt diet.     She eats 2-3 servings of fruits and vegetables daily.She consumes 0 sweetened beverage(s) daily.She exercises with enough effort to increase her heart rate 20 to 29 minutes per day.  She exercises with enough effort to increase her heart rate 3 or less days per week.   She is taking medications regularly.     Today /80.             Objective    Vitals - Patient Reported  Weight (Patient Reported): 68 kg (150 lb)  Height (Patient Reported): 162.6 cm (5' 4\")  BMI " (Based on Pt Reported Ht/Wt): 25.75      Vitals:  No vitals were obtained today due to virtual visit.    Physical Exam   GENERAL: alert and no distress  EYES: Eyes grossly normal to inspection.  No discharge or erythema, or obvious scleral/conjunctival abnormalities.  RESP: No audible wheeze, cough, or visible cyanosis.    SKIN: Visible skin clear. No significant rash, abnormal pigmentation or lesions.  NEURO: Cranial nerves grossly intact.  Mentation and speech appropriate for age.  PSYCH: Appropriate affect, tone, and pace of words          Video-Visit Details    Type of service:  Video Visit   Originating Location (pt. Location): Home    Distant Location (provider location):  On-site  Platform used for Video Visit: [facility]  Signed Electronically by: Perico Gaston MD

## 2025-01-12 ENCOUNTER — OFFICE VISIT (OUTPATIENT)
Dept: URGENT CARE | Facility: URGENT CARE | Age: 40
End: 2025-01-12
Payer: COMMERCIAL

## 2025-01-12 VITALS
SYSTOLIC BLOOD PRESSURE: 124 MMHG | WEIGHT: 150 LBS | OXYGEN SATURATION: 97 % | TEMPERATURE: 99.2 F | DIASTOLIC BLOOD PRESSURE: 86 MMHG | HEART RATE: 81 BPM | BODY MASS INDEX: 25.75 KG/M2

## 2025-01-12 DIAGNOSIS — J01.90 ACUTE BACTERIAL SINUSITIS: Primary | ICD-10-CM

## 2025-01-12 DIAGNOSIS — B96.89 ACUTE BACTERIAL SINUSITIS: Primary | ICD-10-CM

## 2025-01-12 DIAGNOSIS — R68.89 FLU-LIKE SYMPTOMS: ICD-10-CM

## 2025-01-12 PROCEDURE — 99213 OFFICE O/P EST LOW 20 MIN: CPT

## 2025-01-12 RX ORDER — AZITHROMYCIN 500 MG/1
500 TABLET, FILM COATED ORAL DAILY
Qty: 3 TABLET | Refills: 0 | Status: SHIPPED | OUTPATIENT
Start: 2025-01-12 | End: 2025-01-15

## 2025-01-12 NOTE — PROGRESS NOTES
Assessment & Plan     Acute bacterial sinusitis  Flu-like symptoms  Discussed with Lexy that given she has had 10 days of symptoms including intermittent fevers and congestion, I will send in short course of antibiotics to cover for acute bacterial sinusitis and also for community-acquired pneumonia.  There were no focal changes on auscultation of the chest today.  She is vitally stable in clinic and afebrile.  No signs of respiratory distress. Recommend continued good hydration and utilization of alternating ibuprofen and Tylenol on an as-needed basis.  Discussed with patient that if symptoms worsen or fail to improve with management as outlined above, I recommend they follow-up on an as-needed basis.  They verbalized clear understanding and agreement to this treatment plan and had no further questions or concerns at this time.  - azithromycin (ZITHROMAX) 500 MG tablet  Dispense: 3 tablet; Refill: 0    Return if symptoms worsen or fail to improve.    Placido Saini DO  [facility] [place]    Subjective     Lexy is a 39 year old female who presents to clinic today for the following health issues:  Chief Complaint   Patient presents with    Urgent Care     - 1 Week  - Coughing Excessively  - Fever  - Concerned of Bronchitis       HPI  Lexy presents today with intermittent fevers, nasal congestion, productive cough since 1/3/2025.  She reports that her temperature taken at home today was 101.2.  She reports that her voice is changed somewhat as well.  Her partner works as a .  She denies headache, nausea, vomiting, shortness of breath, change in bowel or bladder patterns.  Reports that Robitussin has been helpful at reducing her symptoms.  She reports no additional concerns at this time.        Review of Systems  Constitutional, HEENT, cardiovascular, pulmonary, gi and gu systems are negative, except as otherwise noted.  ROS reviewed, see HPI for pertinent positives and  negatives.  Placido Saini         Objective    /86   Pulse 81   Temp 99.2  F (37.3  C) (Oral)   Wt 68 kg (150 lb)   LMP 12/01/2024 (Exact Date)   SpO2 97%   BMI 25.75 kg/m    Physical Exam  Constitutional:       General: She is not in acute distress.     Appearance: She is not toxic-appearing or diaphoretic.   HENT:      Head: Normocephalic.      Right Ear: Tympanic membrane, ear canal and external ear normal.      Left Ear: Tympanic membrane, ear canal and external ear normal.      Nose: Congestion present.      Mouth/Throat:      Pharynx: No oropharyngeal exudate or posterior oropharyngeal erythema.   Eyes:      General: No scleral icterus.     Conjunctiva/sclera: Conjunctivae normal.   Cardiovascular:      Rate and Rhythm: Normal rate and regular rhythm.      Heart sounds: No murmur heard.     No friction rub. No gallop.   Pulmonary:      Effort: Pulmonary effort is normal. No respiratory distress.      Breath sounds: No wheezing or rales.   Abdominal:      General: There is no distension.   Skin:     Findings: No rash.   Neurological:      Mental Status: She is alert and oriented to person, place, and time.   Psychiatric:         Mood and Affect: Mood normal.         Behavior: Behavior normal.            Placido Saini DO

## 2025-01-16 ENCOUNTER — LAB (OUTPATIENT)
Dept: LAB | Facility: CLINIC | Age: 40
End: 2025-01-16
Attending: FAMILY MEDICINE
Payer: COMMERCIAL

## 2025-01-16 DIAGNOSIS — I10 PRIMARY HYPERTENSION: ICD-10-CM

## 2025-01-16 LAB
ANION GAP SERPL CALCULATED.3IONS-SCNC: 11 MMOL/L (ref 7–15)
BUN SERPL-MCNC: 17.9 MG/DL (ref 6–20)
CALCIUM SERPL-MCNC: 9.5 MG/DL (ref 8.8–10.4)
CHLORIDE SERPL-SCNC: 103 MMOL/L (ref 98–107)
CREAT SERPL-MCNC: 0.93 MG/DL (ref 0.51–0.95)
EGFRCR SERPLBLD CKD-EPI 2021: 79 ML/MIN/1.73M2
GLUCOSE SERPL-MCNC: 88 MG/DL (ref 70–99)
HCO3 SERPL-SCNC: 23 MMOL/L (ref 22–29)
POTASSIUM SERPL-SCNC: 4.2 MMOL/L (ref 3.4–5.3)
SODIUM SERPL-SCNC: 137 MMOL/L (ref 135–145)

## 2025-01-25 NOTE — PROGRESS NOTES
Medication Therapy Management (MTM) Encounter    ASSESSMENT:                            Medication Adherence/Access: No issues identified    Weight Management:   Patient would benefit from : patient doing well --ok to keep self titrating semaglutide weekly dose as needed --see plan for details.       PLAN:                              1. Great news -home weight is 146lbs.--Goal weight 130lbs.---continue to self increase your Semaglutide weekly dose every 2-4 weeks as tolerated --your current dose is 20 units /week =1mg./week , at 25 units weekly dose  the 1ml vial you have will last exactly 4 weeks.      Keep excellent lifestyle changes with diet, exercise and daily Culturelle probiotic.  Glad to hear you started at [employer] and started strength training.    How it is supplied:  You will receive 1ml (230$)or 2ml (370$) vial of medicine depending on your dose. You will also be sent syringes and needles for a month supply.  Let me know if you need a refill of either size vial before our next meeting.    Storage:   Keep your medication stored in the fridge.       Injecting:  Remove your vial from the refrigerator and allow to reach room temperature by sitting out on a counter/ table.  Using the syringes provided with the medication vial, withdraw the prescribed amount of medication to fill the syringe for your dose.                 Follow-up: Return in about 13 weeks (around 4/30/2025), or @ 11 AM via telephone, for Medication Therapy Management Visit, Weight loss.        SUBJECTIVE/OBJECTIVE:                          Lexy Sinha is a 39 year old female called for a follow-up (10-30-24 )visit. She was referred to me from Perico Gaston  .      Reason for visit:   Fv cmpd semaglutide f/up      Allergies/ADRs: Reviewed in chart  Past Medical History: Reviewed in chart  Tobacco: She reports that she has never smoked. She has never been exposed to tobacco smoke. She has never used smokeless tobacco.  Alcohol: Less  than 1 beverages / week  E-cigarette Use: none , no MJ use.  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  Personal Healthcare Goals: 130lbs.--goal wt.before covid, was on ozempic then.  Activity: pickleball but injures her ankle , take it easy right now.      Medication Adherence/Access: no issues reported    Weight Management (has OCD as well):  Current semaglutide weekly dose is 1mg. --3rd dose this week .  Home weight is 146lbs. --feels good now.    Eats proteins first, lots of water daily. Just started at gym - [employer] - started strength training now.      Previously :   Semaglutide 1mg./week- doing well -but not losing because off x 2 weeks expecting surgery   Home weight today =150lbs.   Eats proteins first, lots of water daily.  Exercise--lots of pickleball. No strength training yet. Looking for a gym that is close to her.    Previously :   Semaglutide 0.5mg./week-- home weight today = 149.8lbs but 8-21-24 146.6lbs --she is having period right now.  Will pause med x 1 week for elective surgery.  Takes 1 tab culturelle daily --much easier on GI system(95% better).      Previously :   Semaglutide 0.25mg./week-home weight 148lbs.-- today will receive semaglutide 0.5mg ./week dose --she injects on Thursdays.   Tiny bit of nausea/loose stool --very manageable.  Eats proteins first, lots of water daily.  Exercise--lots of pickleball.      Off all phentermine now. 7-5-24.    Previously :  Phentermine 37.5 mg -1/2 tab /day all she could tolerate. (Anxiety on full tab).  Ozempic 0.5mg./week-was on it for wt.loss and too stop food noise -loved it but HP ins. Cut her off it -none since mid march 2024.  Patient reports no current medication side effects. When on Ozempic loose stools and heartburn)  Nutrition/Eating Habits:   Exercise/Activity: pickleball if ankle not injured.  Medication  "History:  Phentermine:    Ozempic: 0.5mg/week worked well  when      Wt Readings from Last 4 Encounters:   01/12/25 150 lb (68 kg)   08/30/24 150 lb (68 kg)   07/03/24 151 lb (68.5 kg)   05/31/24 151 lb (68.5 kg)     Estimated body mass index is 25.75 kg/m  as calculated from the following:    Height as of 8/30/24: 5' 4\" (1.626 m).    Weight as of 1/12/25: 150 lb (68 kg).      BP Readings from Last 3 Encounters:   01/12/25 124/86   08/30/24 120/82   07/03/24 122/80     ------------------------------------------------------------------------------------------------------------------      I spent 15 minutes with this patient today. All changes were made via collaborative practice agreement with Perico Gaston MD. A copy of the visit note was provided to the patient's provider(s).    A summary of these recommendations was sent via [employer].    Abby Srinivasan Rph.  Medication Therapy Management Provider  [phone]      Telemedicine Visit Details  The patient's medications can be safely assessed via a telemedicine encounter.  Type of service:  Telephone visit  Originating Location (pt. Location): Home    Distant Location (provider location):  Off-site  Start Time: 11:00 AM  End Time: 11:15AM     Medication Therapy Recommendations  Overweight (BMI 25.0-29.9)   1 Current Medication: COMPOUNDED NON-CONTROLLED SUBSTANCE (CMPD RX) - PHARMACY TO MIX COMPOUNDED MEDICATION   Current Medication Sig: Compound Semaglutide 1.0mg. sub-q weekly injection.   Rationale: Dose too low - Dosage too low - Effectiveness   Recommendation: Increase Dose   Status: Accepted per CPA   Identified Date: 1/29/2025 Completed Date: 1/29/2025                         "

## 2025-01-29 ENCOUNTER — VIRTUAL VISIT (OUTPATIENT)
Dept: PHARMACY | Facility: CLINIC | Age: 40
End: 2025-01-29
Payer: COMMERCIAL

## 2025-01-29 DIAGNOSIS — E66.3 OVERWEIGHT (BMI 25.0-29.9): Primary | ICD-10-CM

## 2025-01-29 NOTE — PATIENT INSTRUCTIONS
"Recommendations from today's MTM visit:                                                         1. Great news -home weight is 146lbs.--Goal weight 130lbs.---continue to self increase your Semaglutide weekly dose every 2-4 weeks as tolerated --your current dose is 20 units /week =1mg./week , at 25 units weekly dose  the 1ml vial you have will last exactly 4 weeks.      Keep excellent lifestyle changes with diet, exercise and daily Culturelle probiotic.  Glad to hear you started at [employer] and started strength training.    How it is supplied:  You will receive 1ml (230$)or 2ml (370$) vial of medicine depending on your dose. You will also be sent syringes and needles for a month supply.  Let me know if you need a refill of either size vial before our next meeting.    Storage:   Keep your medication stored in the fridge.       Injecting:  Remove your vial from the refrigerator and allow to reach room temperature by sitting out on a counter/ table.  Using the syringes provided with the medication vial, withdraw the prescribed amount of medication to fill the syringe for your dose.                 Follow-up: Return in about 13 weeks (around 4/30/2025), or @ 11 AM via telephone, for Medication Therapy Management Visit, Weight loss.    It was great speaking with you today.  I value your experience and would be very thankful for your time in providing feedback in our clinic survey. In the next few days, you may receive an email or text message from [employer] with a link to a survey related to your  clinical pharmacist.\"     To schedule another MTM appointment, please call the clinic directly or you may call the MTM scheduling line at [phone] or toll-free at [phone].     My Clinical Pharmacist's contact information:                                                      Please feel free to contact me with any questions or concerns you have.      Abby Srinivasan Rph.  Medication Therapy Management " Provider  [phone]

## 2025-01-29 NOTE — Clinical Note
Perico--ezio doing well on fv cmpd. Semaglutide --she will keep slowly titrating dose to get to sweet spot to hopefully this year reach her goal wt.  Al

## 2025-02-06 ENCOUNTER — OFFICE VISIT (OUTPATIENT)
Dept: OBGYN | Facility: CLINIC | Age: 40
End: 2025-02-06
Payer: COMMERCIAL

## 2025-02-06 VITALS
OXYGEN SATURATION: 100 % | WEIGHT: 148.2 LBS | HEART RATE: 62 BPM | BODY MASS INDEX: 25.3 KG/M2 | HEIGHT: 64 IN | DIASTOLIC BLOOD PRESSURE: 72 MMHG | SYSTOLIC BLOOD PRESSURE: 107 MMHG

## 2025-02-06 DIAGNOSIS — N94.2 VAGINISMUS: ICD-10-CM

## 2025-02-06 DIAGNOSIS — N94.6 DYSMENORRHEA: Primary | ICD-10-CM

## 2025-02-06 PROCEDURE — 99214 OFFICE O/P EST MOD 30 MIN: CPT | Performed by: OBSTETRICS & GYNECOLOGY

## 2025-02-06 RX ORDER — KETOROLAC TROMETHAMINE 10 MG/1
10 TABLET, FILM COATED ORAL EVERY 6 HOURS PRN
Qty: 30 TABLET | Refills: 2 | Status: SHIPPED | OUTPATIENT
Start: 2025-02-06

## 2025-02-06 NOTE — PROGRESS NOTES
"GYN Clinic Visit  Date of visit: 2025   Chief Complaint: pelvic pain    HPI:   Lexy Sinha is a 39 year old  female who presents to discuss pelvic pain.  Patient would like to discuss:  Pelvic pain. Bad episode of pain over xmas, on vacation with family. A lot of vomiting during her period. Pain no longer controlled with ketorolac and tizanidine. If run out of ketorolac, then takes aleve. Takes tylenol too. Pelvic pain occurs around period. Usually 2 days, one of the days is so severe she is not functional. 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 Menses lasts 3-4d, occurs regularly. Nausea and vomiting. Occasional diarrhea. Baths helps.   Things she has tried in the past: OCPs, IUD, patch in the past. An IUD (maybe 5yrs ago) was \"horrific\", was extremely painful the entire time.     Obstetric History:   OB History    Para Term  AB Living   0 0 0 0 0 0   SAB IAB Ectopic Multiple Live Births   0 0 0 0 0       Past Medical History:  Past Medical History:   Diagnosis Date    Acne     Hypertension        Past Surgical History:  Past Surgical History:   Procedure Laterality Date    PARTIAL HYMENECTOMY           Medications:  Current Outpatient Medications   Medication Sig Dispense Refill    clindamycin (CLEOCIN T) 1 % external solution Apply topically 2 times daily 60 mL 1    COMPOUNDED NON-CONTROLLED SUBSTANCE (CMPD RX) - PHARMACY TO MIX COMPOUNDED MEDICATION Compound Semaglutide 1.0mg. sub-q weekly injection. 1 mL 3    cycloSPORINE (RESTASIS) 0.05 % ophthalmic emulsion Place 1 drop into both eyes 2 times daily. 60 each 11    insulin syringe-needle U-100 (31G X 5/16\" 0.5 ML) 31G X 5/16\" 0.5 ML miscellaneous Use 1 syringe weekly or as directed with semaglutide rx. 10 each 3    lisinopril (ZESTRIL) 10 MG tablet Take 1 tablet (10 mg) by mouth daily. 90 tablet 3     No current facility-administered medications for this visit.       Allergy:  Allergies " "  Allergen Reactions    Doxycycline Rash    Nickel Rash         Physical Exam:  Vitals:    25 1353   BP: 107/72   BP Location: Right arm   Patient Position: Sitting   Cuff Size: Adult Regular   Pulse: 62   SpO2: 100%   Weight: 67.2 kg (148 lb 3.2 oz)   Height: 1.626 m (5' 4\")     Estimated body mass index is 25.44 kg/m  as calculated from the following:    Height as of this encounter: 1.626 m (5' 4\").    Weight as of this encounter: 67.2 kg (148 lb 3.2 oz).     Gen: healthy, alert, active, no distress        Assessment:  Lexy Sinha is a 39 year old  who presents in consultation for dysmenorrhea.     Plan:  1. Dysmenorrhea (Primary)  Discussed likely due to endometriosis  Discussed role of surgery in management of endometriosis  Discussed usually recommend ongoing medical management after surgery  Discussed hysterectomy versus diagnostic laparoscopy with excision/fulguration of endometriosis  Pt prefers diagnostic laparoscopy at this time  The risks, benefits, alternatives and indications diagnostic laparoscopy were discussed with the patient in detail. The risks specifically addressed were infection, most commonly occurring at the level of the skin incision, bleeding which could result in additional surgical procedures, or a blood transfusion, and injury to other organs like her bowel and bladder. The patient also understands that this procedure would require general anesthesia and be associated with risks with general anesthesia. The patient does desire to proceed with a diagnostic laparoscopy which was discussed in detail with the patient. She understands the procedure lasts approximately 30 to 60 minutes and she should expect an approximately 2-week recovery from the procedure afterwards. The patient understands this is an outpatient procedure. She will present on the day of surgery and be discharged home later that day, need some one to drive her home and stay overnight.   - Case Request: " LAPAROSCOPY, possible excision and fulguration of endometriosis  - ketorolac (TORADOL) 10 MG tablet; Take 1 tablet (10 mg) by mouth every 6 hours as needed for moderate pain.  Dispense: 30 tablet; Refill: 2  - US Transvaginal Pelvic Non-OB; Future    2. Vaginismus  - Physical Therapy  Referral; Future        Ivette Valenzuela MD

## 2025-02-10 ENCOUNTER — TELEPHONE (OUTPATIENT)
Dept: OBGYN | Facility: CLINIC | Age: 40
End: 2025-02-10
Payer: COMMERCIAL

## 2025-02-10 NOTE — TELEPHONE ENCOUNTER
Type of surgery: gyn  Location of surgery: [facility] OR  Date and time of surgery: 04/25/2025 12:10PM  Surgeon: Dr. Ivette Valenzuela  Pre-Op Appt Date: 04/04/2025  Post-Op Appt Date: 2 weeks, waiting for schedule   Packet sent out: Yes  Pre-cert/Authorization completed:  Not Applicable  Date: 02/10/2025

## 2025-02-12 ENCOUNTER — OFFICE VISIT (OUTPATIENT)
Dept: DERMATOLOGY | Facility: CLINIC | Age: 40
End: 2025-02-12
Payer: COMMERCIAL

## 2025-02-12 DIAGNOSIS — L82.0 INFLAMED SEBORRHEIC KERATOSIS: Primary | ICD-10-CM

## 2025-02-12 DIAGNOSIS — D22.9 MULTIPLE BENIGN NEVI: ICD-10-CM

## 2025-02-12 DIAGNOSIS — L81.4 LENTIGINES: ICD-10-CM

## 2025-02-12 NOTE — LETTER
2/12/2025      Lexy Sinha  [address]  [facility] [place] [address]      Dear Colleague,    Thank you for referring your patient, Lexy Sinha, to the [facility]. Please see a copy of my visit note below.    [facility] Dermatology Note    Encounter Date: Feb 12, 2025    Dermatology Problem List:    ______________________________________    Impression/Plan:  Lexy was seen today for derm problem.    Diagnoses and all orders for this visit:    Inflamed seborrheic keratosis  -     DESTRUCT BENIGN LESION, UP TO 14  - x1 R back     Multiple benign nevi  Lentigines  - Reviewed the compounding benefits of incremental changes to sun protective clothing behaviors including increased frequency of sunscreen and sun protective clothing like broad brimmed hats and longsleeved UPF containing clothing        Cryotherapy procedure note: After verbal consent and discussion of risks and benefits including but no limited to dyspigmentation/scar, blister, and pain, 1 Isk r backwas(were) treated with 1-2mm freeze border for 2 cycles with liquid nitrogen. Post cryotherapy instructions were provided.     Follow-up PRN.       Staff Involved:  Staff Only    Van Conrad MD   of Dermatology  Department of Dermatology  [facility]      CC:   Chief Complaint   Patient presents with     Derm Problem     Spot check-lesions on back-present for many months-asymptomatic-has not tried any medication to the areas       History of Present Illness:  Ms. Lexy Sinha is a 39 year old female who presents as a new patient.    Pt presents today for concerns about spots on trunk and extremities       Labs:      Physical exam:  Vitals: LMP 01/19/2025 (Exact Date)   GEN: well developed, well-nourished, in no acute distress, in a pleasant mood.     SKIN: Chavez phototype 1  - Waist-up skin, which includes the head/face, neck, both  "arms, chest, back, abdomen, digits and/or nails was examined.  - Flat brown macules and patches in a sun exposed areas on face and extremities  - scattered brown papules on trunk and extremities   - Stuck on brown papules on trunk and extremities   - No other lesions of concern on areas examined.     Past Medical History:   Past Medical History:   Diagnosis Date     Acne      Hypertension      Past Surgical History:   Procedure Laterality Date     PARTIAL HYMENECTOMY         Social History:   reports that she has never smoked. She has never been exposed to tobacco smoke. She has never used smokeless tobacco. She reports current alcohol use. She reports that she does not use drugs.    Family History:  Family History   Problem Relation Age of Onset     Cerebrovascular Disease Mother      Hypertension Mother      Cancer Father      Hypertension Father      Macular Degeneration Maternal Grandmother      Glaucoma Maternal Grandmother      Melanoma Paternal Aunt        Medications:  Current Outpatient Medications   Medication Sig Dispense Refill     clindamycin (CLEOCIN T) 1 % external solution Apply topically 2 times daily 60 mL 1     COMPOUNDED NON-CONTROLLED SUBSTANCE (CMPD RX) - PHARMACY TO MIX COMPOUNDED MEDICATION Compound Semaglutide 1.0mg. sub-q weekly injection. 1 mL 3     cycloSPORINE (RESTASIS) 0.05 % ophthalmic emulsion Place 1 drop into both eyes 2 times daily. 60 each 11     insulin syringe-needle U-100 (31G X 5/16\" 0.5 ML) 31G X 5/16\" 0.5 ML miscellaneous Use 1 syringe weekly or as directed with semaglutide rx. 10 each 3     ketorolac (TORADOL) 10 MG tablet Take 1 tablet (10 mg) by mouth every 6 hours as needed for moderate pain. 30 tablet 2     lisinopril (ZESTRIL) 10 MG tablet Take 1 tablet (10 mg) by mouth daily. 90 tablet 3     Allergies   Allergen Reactions     Doxycycline Rash     Nickel Rash                 Again, thank you for allowing me to participate in the care of your patient.  "       Sincerely,        Van Conrad MD    Electronically signed

## 2025-02-12 NOTE — PROGRESS NOTES
[facility] Dermatology Note    Encounter Date: Feb 12, 2025    Dermatology Problem List:    ______________________________________    Impression/Plan:  Lexy was seen today for derm problem.    Diagnoses and all orders for this visit:    Inflamed seborrheic keratosis  -     DESTRUCT BENIGN LESION, UP TO 14  - x1 R back     Multiple benign nevi  Lentigines  - Reviewed the compounding benefits of incremental changes to sun protective clothing behaviors including increased frequency of sunscreen and sun protective clothing like broad brimmed hats and longsleeved UPF containing clothing        Cryotherapy procedure note: After verbal consent and discussion of risks and benefits including but no limited to dyspigmentation/scar, blister, and pain, 1 Isk r backwas(were) treated with 1-2mm freeze border for 2 cycles with liquid nitrogen. Post cryotherapy instructions were provided.     Follow-up PRN.       Staff Involved:  Staff Only    Van Conrad MD   of Dermatology  Department of Dermatology  [facility]      CC:   Chief Complaint   Patient presents with    Derm Problem     Spot check-lesions on back-present for many months-asymptomatic-has not tried any medication to the areas       History of Present Illness:  Ms. Lexy Sinha is a 39 year old female who presents as a new patient.    Pt presents today for concerns about spots on trunk and extremities       Labs:      Physical exam:  Vitals: LMP 01/19/2025 (Exact Date)   GEN: well developed, well-nourished, in no acute distress, in a pleasant mood.     SKIN: Chavez phototype 1  - Waist-up skin, which includes the head/face, neck, both arms, chest, back, abdomen, digits and/or nails was examined.  - Flat brown macules and patches in a sun exposed areas on face and extremities  - scattered brown papules on trunk and extremities   - Stuck on brown papules on trunk and extremities   - No  "other lesions of concern on areas examined.     Past Medical History:   Past Medical History:   Diagnosis Date    Acne     Hypertension      Past Surgical History:   Procedure Laterality Date    PARTIAL HYMENECTOMY         Social History:   reports that she has never smoked. She has never been exposed to tobacco smoke. She has never used smokeless tobacco. She reports current alcohol use. She reports that she does not use drugs.    Family History:  Family History   Problem Relation Age of Onset    Cerebrovascular Disease Mother     Hypertension Mother     Cancer Father     Hypertension Father     Macular Degeneration Maternal Grandmother     Glaucoma Maternal Grandmother     Melanoma Paternal Aunt        Medications:  Current Outpatient Medications   Medication Sig Dispense Refill    clindamycin (CLEOCIN T) 1 % external solution Apply topically 2 times daily 60 mL 1    COMPOUNDED NON-CONTROLLED SUBSTANCE (CMPD RX) - PHARMACY TO MIX COMPOUNDED MEDICATION Compound Semaglutide 1.0mg. sub-q weekly injection. 1 mL 3    cycloSPORINE (RESTASIS) 0.05 % ophthalmic emulsion Place 1 drop into both eyes 2 times daily. 60 each 11    insulin syringe-needle U-100 (31G X 5/16\" 0.5 ML) 31G X 5/16\" 0.5 ML miscellaneous Use 1 syringe weekly or as directed with semaglutide rx. 10 each 3    ketorolac (TORADOL) 10 MG tablet Take 1 tablet (10 mg) by mouth every 6 hours as needed for moderate pain. 30 tablet 2    lisinopril (ZESTRIL) 10 MG tablet Take 1 tablet (10 mg) by mouth daily. 90 tablet 3     Allergies   Allergen Reactions    Doxycycline Rash    Nickel Rash                 "

## 2025-02-12 NOTE — PROGRESS NOTES
[facility] Dermatology Note    Encounter Date: Feb 12, 2025    Dermatology Problem List:  - ***    Major PMHx  -   ______________________________________    Impression/Plan:  There are no diagnoses linked to this encounter.    {Procedure:852153}    Follow-up in ***.       Staff Involved:  Staff Only    Van Conrad MD   of Dermatology  Department of Dermatology  [facility]      CC:   Chief Complaint   Patient presents with    Derm Problem     Spot check-lesions on back-present for many months-asymptomatic-has not tried any medication to the areas       History of Present Illness:  Ms. Lexy Sinha is a 39 year old female who presents as a new patient.    Pt presents today for concerns about spots on trunk and extremities. Mole on back getting bigger, is bothersome.       Labs:      Physical exam:  Vitals: LMP 01/19/2025 (Exact Date)   GEN: well developed, well-nourished, in no acute distress, in a pleasant mood.     SKIN: Chavez phototype ***  - {Skin Exam:474108}  ***  - No other lesions of concern on areas examined.     Past Medical History:   Past Medical History:   Diagnosis Date    Acne     Hypertension      Past Surgical History:   Procedure Laterality Date    PARTIAL HYMENECTOMY         Social History:   reports that she has never smoked. She has never been exposed to tobacco smoke. She has never used smokeless tobacco. She reports current alcohol use. She reports that she does not use drugs.    Family History:  Family History   Problem Relation Age of Onset    Cerebrovascular Disease Mother     Hypertension Mother     Cancer Father     Hypertension Father     Macular Degeneration Maternal Grandmother     Glaucoma Maternal Grandmother     Melanoma Paternal Aunt        Medications:  Current Outpatient Medications   Medication Sig Dispense Refill    clindamycin (CLEOCIN T) 1 % external solution Apply topically 2 times daily 60  "mL 1    COMPOUNDED NON-CONTROLLED SUBSTANCE (CMPD RX) - PHARMACY TO MIX COMPOUNDED MEDICATION Compound Semaglutide 1.0mg. sub-q weekly injection. 1 mL 3    cycloSPORINE (RESTASIS) 0.05 % ophthalmic emulsion Place 1 drop into both eyes 2 times daily. 60 each 11    insulin syringe-needle U-100 (31G X 5/16\" 0.5 ML) 31G X 5/16\" 0.5 ML miscellaneous Use 1 syringe weekly or as directed with semaglutide rx. 10 each 3    ketorolac (TORADOL) 10 MG tablet Take 1 tablet (10 mg) by mouth every 6 hours as needed for moderate pain. 30 tablet 2    lisinopril (ZESTRIL) 10 MG tablet Take 1 tablet (10 mg) by mouth daily. 90 tablet 3     Allergies   Allergen Reactions    Doxycycline Rash    Nickel Rash               "

## 2025-02-18 ENCOUNTER — OFFICE VISIT (OUTPATIENT)
Dept: URGENT CARE | Facility: URGENT CARE | Age: 40
End: 2025-02-18
Payer: COMMERCIAL

## 2025-02-18 ENCOUNTER — ANCILLARY PROCEDURE (OUTPATIENT)
Dept: GENERAL RADIOLOGY | Facility: CLINIC | Age: 40
End: 2025-02-18
Attending: NURSE PRACTITIONER
Payer: COMMERCIAL

## 2025-02-18 VITALS
SYSTOLIC BLOOD PRESSURE: 111 MMHG | OXYGEN SATURATION: 97 % | DIASTOLIC BLOOD PRESSURE: 77 MMHG | RESPIRATION RATE: 18 BRPM | TEMPERATURE: 98 F | HEART RATE: 103 BPM | HEIGHT: 64 IN | WEIGHT: 150 LBS | BODY MASS INDEX: 25.61 KG/M2

## 2025-02-18 DIAGNOSIS — R11.2 NAUSEA AND VOMITING, UNSPECIFIED VOMITING TYPE: ICD-10-CM

## 2025-02-18 DIAGNOSIS — B96.89 BACTERIAL RESPIRATORY INFECTION: Primary | ICD-10-CM

## 2025-02-18 DIAGNOSIS — J98.8 BACTERIAL RESPIRATORY INFECTION: Primary | ICD-10-CM

## 2025-02-18 DIAGNOSIS — R05.3 PERSISTENT COUGH: ICD-10-CM

## 2025-02-18 LAB
DEPRECATED S PYO AG THROAT QL EIA: NEGATIVE
FLUAV AG SPEC QL IA: NEGATIVE
FLUBV AG SPEC QL IA: NEGATIVE
S PYO DNA THROAT QL NAA+PROBE: NOT DETECTED

## 2025-02-18 PROCEDURE — 87804 INFLUENZA ASSAY W/OPTIC: CPT | Performed by: NURSE PRACTITIONER

## 2025-02-18 PROCEDURE — 99214 OFFICE O/P EST MOD 30 MIN: CPT | Performed by: NURSE PRACTITIONER

## 2025-02-18 PROCEDURE — 87635 SARS-COV-2 COVID-19 AMP PRB: CPT | Performed by: NURSE PRACTITIONER

## 2025-02-18 PROCEDURE — 87651 STREP A DNA AMP PROBE: CPT | Performed by: NURSE PRACTITIONER

## 2025-02-18 PROCEDURE — 71046 X-RAY EXAM CHEST 2 VIEWS: CPT | Mod: TC | Performed by: INTERNAL MEDICINE

## 2025-02-18 RX ORDER — ONDANSETRON 4 MG/1
4 TABLET, ORALLY DISINTEGRATING ORAL EVERY 8 HOURS PRN
Qty: 10 TABLET | Refills: 0 | Status: SHIPPED | OUTPATIENT
Start: 2025-02-18 | End: 2025-02-21

## 2025-02-18 RX ORDER — ONDANSETRON 4 MG/1
4 TABLET, ORALLY DISINTEGRATING ORAL ONCE
Status: COMPLETED | OUTPATIENT
Start: 2025-02-18 | End: 2025-02-18

## 2025-02-18 RX ADMIN — ONDANSETRON 4 MG: 4 TABLET, ORALLY DISINTEGRATING ORAL at 10:57

## 2025-02-18 ASSESSMENT — PAIN SCALES - GENERAL: PAINLEVEL_OUTOF10: MILD PAIN (3)

## 2025-02-18 NOTE — PROGRESS NOTES
Assessment & Plan      Diagnosis Comments   1. Bacterial respiratory infection  amoxicillin-clavulanate (AUGMENTIN) 875-125 MG tablet   Acute onset of symptoms with recent history of illness one month ago. There is potential this could be a new illness or a continuation of illness from last month as she feels she did not fully improve from that illness from this. Influenza and strep were negative in clinic, strep test sent for culture and awaiting COVID result.     Given this and overall ill appearance, opted to treat with amoxicillin-clavulanate as patient is allergic to doxycycline.  Given small supply of zofran for nausea. Home care reviewed. Patient and  verbalized understanding; will monitor symptoms closely. Reviewed s/e to medications.     Follow up with primary care in 1 week if symptoms not improving.       2. Nausea and vomiting, unspecified vomiting type  ondansetron (ZOFRAN ODT) ODT tab 4 mg, XR Chest 2 Views, Influenza A & B Antigen - Clinic Collect, Streptococcus A Rapid Screen w/Reflex to PCR - Clinic Collect, COVID-19 Virus (Coronavirus) by PCR Nose, Group A Streptococcus PCR Throat Swab, ondansetron (ZOFRAN ODT) 4 MG ODT tab   Given recent course      3. Persistent cough  XR Chest 2 Views, Influenza A & B Antigen - Clinic Collect, Streptococcus A Rapid Screen w/Reflex to PCR - Clinic Collect, Group A Streptococcus PCR Throat Swab          Ulysses Chin DNP Student 2/18/2025 12:41 PM    I saw and evaluated the patient and agree with the findings and plan of care as documented in the note.    Edna Fernández [facility] CARE [place]    Ella Pugh is a 39 year old female who presents to clinic today for the following health issues:  Chief Complaint   Patient presents with    Urgent Care    Respiratory Problems     Pt in clinic to have eval for nausea, wheezing  and chest pain when coughing.     HPI    URI Adult    Onset of symptoms was 1 day(s) ago, however  "last month she was seen and treated on 01/12/2025 for acute bacterial sinusitis/ flu-like symptoms for which she completed a three day course of azithromycin. She notes an improvement in symptoms since completion of the course, but sudden worsening beginning yesterday along with new intermittent nausea and and upper respiratory pain that does not radiate to shoulders or back. Patient had episode of emesis in clinic today.  Current and Associated symptoms: chills, runny nose, stuffy nose, cough - productive of thick sputum unsure what color, wheezing, shortness of breath, sore throat, body aches, fatigue, nausea, and vomiting  Treatment measures tried include Tylenol/Ibuprofen, OTC Cough med, Fluids, and Rest.  Predisposing factors include  works as a , although no known illness exposures. At home COVID-19 test yesterday was negative.    Review of Systems  Constitutional, HEENT, cardiovascular, pulmonary, gi and gu systems are negative, except as otherwise noted.      Objective    /77   Pulse 103   Temp 98  F (36.7  C) (Temporal)   Resp 18   Ht 1.626 m (5' 4\")   Wt 68 kg (150 lb)   LMP 02/05/2025 (Exact Date)   SpO2 97%   BMI 25.75 kg/m    Physical Exam   GENERAL: alert, no distress, and pale  HENT: right ear: normal: no effusions, no erythema, normal landmarks, left ear: TM slightly pink, oropharynx clear, and oral mucous membranes moist  RESP: lungs clear to auscultation - no rales, rhonchi or wheezes  CV: tachycardia, normal S1 S2, no S3 or S4, and no murmur, click or rub  MS: no gross musculoskeletal defects noted, no edema  PSYCH: mentation appears normal, affect flat, and fatigued  LYMPH: no cervical adenopathy      Results for orders placed or performed in visit on 02/18/25   XR Chest 2 Views     Status: None    Narrative    EXAM: XR CHEST 2 VIEWS  LOCATION: [facility]  DATE: 2/18/2025    INDICATION:  Nausea and vomiting, unspecified " vomiting type, Persistent cough  COMPARISON: None.      Impression    IMPRESSION: Negative chest.   Influenza A & B Antigen - Clinic Collect     Status: Normal    Specimen: Nose; Swab   Result Value Ref Range    Influenza A antigen Negative Negative    Influenza B antigen Negative Negative    Narrative    Test results must be correlated with clinical data. If necessary, results should be confirmed by a molecular assay or viral culture.   Streptococcus A Rapid Screen w/Reflex to PCR - Clinic Collect     Status: Normal    Specimen: Throat; Swab   Result Value Ref Range    Group A Strep antigen Negative Negative

## 2025-02-18 NOTE — RESULT ENCOUNTER NOTE
Results discussed with patient in clinic. States understanding of these results.    Edna Fernández CNP

## 2025-02-19 LAB — SARS-COV-2 RNA RESP QL NAA+PROBE: NEGATIVE

## 2025-02-26 ENCOUNTER — OFFICE VISIT (OUTPATIENT)
Dept: OPHTHALMOLOGY | Facility: CLINIC | Age: 40
End: 2025-02-26
Attending: OPHTHALMOLOGY
Payer: COMMERCIAL

## 2025-02-26 DIAGNOSIS — H04.123 DRY EYE SYNDROME OF BOTH EYES: Primary | ICD-10-CM

## 2025-02-26 PROCEDURE — 99211 OFF/OP EST MAY X REQ PHY/QHP: CPT | Performed by: OPHTHALMOLOGY

## 2025-02-26 ASSESSMENT — TONOMETRY
IOP_METHOD: ICARE
OD_IOP_MMHG: 20
OS_IOP_MMHG: 17

## 2025-02-26 ASSESSMENT — SLIT LAMP EXAM - LIDS
COMMENTS: 2+ MEIBOMIAN GLAND DYSFUNCTION
COMMENTS: 2+ MEIBOMIAN GLAND DYSFUNCTION

## 2025-02-26 ASSESSMENT — REFRACTION_WEARINGRX
SPECS_TYPE: SVL
OS_SPHERE: -4.00
OS_AXIS: 007
OD_CYLINDER: +0.75
OS_CYLINDER: +0.50
OD_SPHERE: -4.25
OD_AXIS: 019

## 2025-02-26 ASSESSMENT — VISUAL ACUITY
CORRECTION_TYPE: GLASSES
OS_CC: 20/20
OD_CC: 20/20
METHOD: SNELLEN - LINEAR
OD_CC+: -1

## 2025-02-26 NOTE — PROGRESS NOTES
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  Charactertized as  blurred vision.  Severity is mild.  It is worse when reading.  Associated symptoms include Negative for dryness.  Pain was noted as 0/10.             Comments    Lexy Sinha is being seen for a consult today by the request of Dr. Gaston for recurrent corneal erosion.      Pt has also seen  [facility] a few [facility] over a 2 month period (last time she was there was about two months ago).  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      She has worn contacts for about 20 years and never had problems until this summer.  She started taking spirinolactone for acne and HTN prior to this summer and noticed a lot of hair and skin dryness (now on 25mg every day), so she is wondering if this medication could be contributing to the RCE.      She's used steroid gtts, gel at night, regular gtts, and tyrvaya - she still can't tolerate wearing a CL for more than one day and then has to take a two week break.      She's worn CL's once within the past two months now.    She doesn't feel she has dry eye symptoms.  She only notices issues when removing her contacts, has immediate sharp pain.      Current oc meds:  Retaine servando at bedtime each eye     She has been noticing some blur in her left eye particularly when reading.  She feels her distance vision is okay.      For CL's she was using acuvue dailies and has always used opti free to rinse the CL's prior to putting them in.  She's never tried CL's without using optifree.      She states her MGM had glaucoma.  Lexy herself states she has \"both genetic markers for macular degeneration\" (data from ancestry DNA)    RUEL Ortega 8:06 AM 11/22/2024                    " "    Interval hx 02/26/2025  Chief Complaint(s) and History of Present Illness(es)       Follow Up    Since onset it is gradually worsening.  Associated symptoms include dryness.  Negative for eye pain, headache, flashes, floaters and discharge.             Comments    Pt feels her left eye is really bad, especially near. It's been worse for a while, not sure when noticed. She reports the main reason she is here is for dry eyes. She's only tried wearing contacts once since last exam and they were very difficult to remove.     Ocular Meds:   Restasis BID OU    Jane Mancia OA 11:09 AM February 26, 2025                      Past ocular history   Prior eye surgery/laser/Trauma: no  CTL wearer:yes  Glasses : Yes  Family Hx of eye disease: maternal grandmother: glaucoma;     PMH     Past Medical History:   Diagnosis Date    Acne     Hypertension        PSH     Past Surgical History:   Procedure Laterality Date    PARTIAL HYMENECTOMY         Meds     Current Outpatient Medications   Medication Sig Dispense Refill    clindamycin (CLEOCIN T) 1 % external solution Apply topically 2 times daily 60 mL 1    cycloSPORINE (RESTASIS) 0.05 % ophthalmic emulsion Place 1 drop into both eyes 2 times daily. 60 each 11    COMPOUNDED NON-CONTROLLED SUBSTANCE (CMPD RX) - PHARMACY TO MIX COMPOUNDED MEDICATION Compound Semaglutide 1.0mg. sub-q weekly injection. 1 mL 3    insulin syringe-needle U-100 (31G X 5/16\" 0.5 ML) 31G X 5/16\" 0.5 ML miscellaneous Use 1 syringe weekly or as directed with semaglutide rx. (Patient not taking: Reported on 2/18/2025) 10 each 3    ketorolac (TORADOL) 10 MG tablet Take 1 tablet (10 mg) by mouth every 6 hours as needed for moderate pain. (Patient not taking: Reported on 2/18/2025) 30 tablet 2    lisinopril (ZESTRIL) 10 MG tablet Take 1 tablet (10 mg) by mouth daily. (Patient not taking: Reported on 2/18/2025) 90 tablet 3     No current facility-administered medications for this visit.           Drops " Currently Taking   Retain Ointment at night    Assessment/Plan 02/26/2025   #dry eye syndrome each eye  -contact lens usage, did not use since last visit  -already on AT  -tried tyrvaya   S/p plugs (collagen)  Still had intolerance with soft lenses and would consider scleral lenses    Plan:  -------  restasis BID OU  - WC and lid scrubs  - PFAT TiD OU  Refer to Dr Santos  Do not recommend LASIK but ICL can be a better option    Follow up:  6 months or sooner if needed    Attending Physician Attestation:  Complete documentation of historical and exam elements from today's encounter can be found in the full encounter summary report (not reduplicated in this progress note).  I personally obtained the chief complaint(s) and history of present illness.  I confirmed and edited as necessary the review of systems, past medical/surgical history, family history, social history, and examination findings as documented by others; and I examined the patient myself.  I personally reviewed the relevant tests, images, and reports as documented above.  I formulated and edited as necessary the assessment and plan and discussed the findings and management plan with the patient and family. - Nila Jackson MD

## 2025-02-26 NOTE — NURSING NOTE
Chief Complaints and History of Present Illnesses   Patient presents with    Follow Up     Chief Complaint(s) and History of Present Illness(es)       Follow Up              Course: gradually worsening    Associated symptoms: dryness.  Negative for eye pain, headache, flashes, floaters and discharge              Comments    Pt feels her left eye is really bad, especially near. It's been worse for a while, not sure when noticed. She reports the main reason she is here is for dry eyes. She's only tried wearing contacts once since last exam and they were very difficult to remove.     Ocular Meds:   Restasis BID OU    Jane Mancia OA 11:09 AM February 26, 2025

## 2025-03-13 ENCOUNTER — TELEPHONE (OUTPATIENT)
Dept: FAMILY MEDICINE | Facility: CLINIC | Age: 40
End: 2025-03-13
Payer: COMMERCIAL

## 2025-03-13 NOTE — TELEPHONE ENCOUNTER
PRIOR AUTHORIZATION DENIED    Medication: SEMAGLUTIDE-WEIGHT MANAGEMENT 1 MG/0.5ML SC SOAJ  Insurance Company: [employer] - Phone [phone] Fax [phone]  Denial Date: 3/13/2025  Denial Reason(s):     Appeal Information:     Patient Notified: No

## 2025-04-01 ENCOUNTER — ANCILLARY PROCEDURE (OUTPATIENT)
Dept: MAMMOGRAPHY | Facility: CLINIC | Age: 40
End: 2025-04-01
Attending: FAMILY MEDICINE
Payer: COMMERCIAL

## 2025-04-01 DIAGNOSIS — Z12.31 VISIT FOR SCREENING MAMMOGRAM: ICD-10-CM

## 2025-04-01 PROCEDURE — 77063 BREAST TOMOSYNTHESIS BI: CPT | Mod: TC | Performed by: RADIOLOGY

## 2025-04-01 PROCEDURE — 77067 SCR MAMMO BI INCL CAD: CPT | Mod: TC | Performed by: RADIOLOGY

## 2025-04-07 ENCOUNTER — MYC MEDICAL ADVICE (OUTPATIENT)
Dept: PHARMACY | Facility: CLINIC | Age: 40
End: 2025-04-07

## 2025-04-07 NOTE — TELEPHONE ENCOUNTER
Denton Mora -     I was informed by the [facility] that they are no longer going to be making compounded semaglutide injections after 4/22. My doctor said I should message you to see what we can do.     Thank you,  Lexy       Order another bottle soon fv cmpd --them see me 4-30-35 for options.    pilar

## 2025-04-15 ENCOUNTER — OFFICE VISIT (OUTPATIENT)
Dept: OPTOMETRY | Facility: CLINIC | Age: 40
End: 2025-04-15
Payer: COMMERCIAL

## 2025-04-15 DIAGNOSIS — H52.10 MYOPIA WITH REGULAR ASTIGMATISM: ICD-10-CM

## 2025-04-15 DIAGNOSIS — H18.833 RECURRENT EROSION OF BOTH CORNEAS: ICD-10-CM

## 2025-04-15 DIAGNOSIS — H52.229 MYOPIA WITH REGULAR ASTIGMATISM: ICD-10-CM

## 2025-04-15 DIAGNOSIS — H04.129 DRY EYE: Primary | ICD-10-CM

## 2025-04-15 ASSESSMENT — REFRACTION_WEARINGRX
OS_CYLINDER: +0.50
SPECS_TYPE: SVL
OD_CYLINDER: +0.75
OD_SPHERE: -4.25
OS_SPHERE: -4.00
OS_AXIS: 007
OD_AXIS: 019

## 2025-04-15 ASSESSMENT — VISUAL ACUITY
OS_CC+: -1
OS_CC: 20/20
METHOD: SNELLEN - LINEAR
CORRECTION_TYPE: GLASSES
OD_CC: 20/20

## 2025-04-15 ASSESSMENT — TONOMETRY
OS_IOP_MMHG: 15
OD_IOP_MMHG: 17
IOP_METHOD: ICARE

## 2025-04-15 ASSESSMENT — REFRACTION_CURRENTRX
OS_SPHERE: -2.50
OD_SPHERE: -2.50
OD_BRAND: ONEFIT
OD_BASECURVE: 7.7
OS_DIAMETER: 15.2
OS_BASECURVE: 7.8
OD_DIAMETER: 15.2

## 2025-04-15 NOTE — PROGRESS NOTES
A/P  1.) Dry Eye with possible RCE OU  -Intolerant to soft lenses (eye pain/irritation on removal that lasts for several days). Issue x several years  -Now on dry eye therapy, referred for scleral lens eval to possibly resume contact lens wear  -Good response today. Corrects well with OR pushing plus  -Reviewed findings with pt including fitting process and adaptation. She would like to proceed    Order lenses and HOLD for lens dispense, I&R    I have confirmed the patient's CC, HPI and reviewed Past Medical History, Past Surgical History, Social History, Family History, Problem List, Medication List and agree with Tech note.     Janell Guerra, OD [facility]

## 2025-04-15 NOTE — NURSING NOTE
Chief Complaints and History of Present Illnesses   Patient presents with    Contact Lens Evaluation     Pt here for contact lens evaluation.     Chief Complaint(s) and History of Present Illness(es)       Contact Lens Evaluation              Laterality: both eyes    Comments: Pt here for contact lens evaluation.              Comments    Pt wears soft contact lenses. She has been unable to wear them due to dryness. Vision is largely unchanged since last exam.     Delmi Tabares, COT on 4/15/2025 at 8:22 AM

## 2025-04-22 LAB
ABO + RH BLD: NORMAL
BLD GP AB SCN SERPL QL: NEGATIVE
SPECIMEN EXP DATE BLD: NORMAL

## 2025-04-23 ENCOUNTER — LAB (OUTPATIENT)
Dept: LAB | Facility: CLINIC | Age: 40
End: 2025-04-23
Payer: COMMERCIAL

## 2025-04-23 DIAGNOSIS — Z01.818 PRE-OP EXAM: ICD-10-CM

## 2025-04-23 DIAGNOSIS — N94.6 DYSMENORRHEA: ICD-10-CM

## 2025-04-23 LAB
ERYTHROCYTE [DISTWIDTH] IN BLOOD BY AUTOMATED COUNT: 11.8 % (ref 10–15)
HCT VFR BLD AUTO: 43 % (ref 35–47)
HGB BLD-MCNC: 14.5 G/DL (ref 11.7–15.7)
MCH RBC QN AUTO: 31.3 PG (ref 26.5–33)
MCHC RBC AUTO-ENTMCNC: 33.7 G/DL (ref 31.5–36.5)
MCV RBC AUTO: 93 FL (ref 78–100)
PLATELET # BLD AUTO: 263 10E3/UL (ref 150–450)
RBC # BLD AUTO: 4.63 10E6/UL (ref 3.8–5.2)
WBC # BLD AUTO: 10.6 10E3/UL (ref 4–11)

## 2025-04-23 PROCEDURE — 36415 COLL VENOUS BLD VENIPUNCTURE: CPT

## 2025-04-23 PROCEDURE — 85027 COMPLETE CBC AUTOMATED: CPT

## 2025-04-23 PROCEDURE — 86901 BLOOD TYPING SEROLOGIC RH(D): CPT

## 2025-04-23 PROCEDURE — 86900 BLOOD TYPING SEROLOGIC ABO: CPT

## 2025-04-23 PROCEDURE — 86850 RBC ANTIBODY SCREEN: CPT

## 2025-04-25 ENCOUNTER — HOSPITAL ENCOUNTER (OUTPATIENT)
Facility: CLINIC | Age: 40
Discharge: HOME | End: 2025-04-25
Attending: OBSTETRICS & GYNECOLOGY | Admitting: OBSTETRICS & GYNECOLOGY
Payer: COMMERCIAL

## 2025-04-25 VITALS
OXYGEN SATURATION: 97 % | BODY MASS INDEX: 25.74 KG/M2 | WEIGHT: 150.79 LBS | HEART RATE: 84 BPM | HEIGHT: 64 IN | RESPIRATION RATE: 16 BRPM | TEMPERATURE: 97.5 F | DIASTOLIC BLOOD PRESSURE: 74 MMHG | SYSTOLIC BLOOD PRESSURE: 115 MMHG

## 2025-04-25 DIAGNOSIS — G89.18 ACUTE POST-OPERATIVE PAIN: Primary | ICD-10-CM

## 2025-04-25 LAB — HCG UR QL: NEGATIVE

## 2025-04-25 PROCEDURE — 360N000076 HC SURGERY LEVEL 3, PER MIN: Performed by: OBSTETRICS & GYNECOLOGY

## 2025-04-25 PROCEDURE — 710N000012 HC RECOVERY PHASE 2, PER MINUTE: Performed by: OBSTETRICS & GYNECOLOGY

## 2025-04-25 PROCEDURE — 999N000141 HC STATISTIC PRE-PROCEDURE NURSING ASSESSMENT: Performed by: OBSTETRICS & GYNECOLOGY

## 2025-04-25 PROCEDURE — 272N000001 HC OR GENERAL SUPPLY STERILE: Performed by: OBSTETRICS & GYNECOLOGY

## 2025-04-25 PROCEDURE — 250N000011 HC RX IP 250 OP 636: Performed by: OBSTETRICS & GYNECOLOGY

## 2025-04-25 PROCEDURE — 88305 TISSUE EXAM BY PATHOLOGIST: CPT | Mod: TC | Performed by: OBSTETRICS & GYNECOLOGY

## 2025-04-25 PROCEDURE — 250N000025 HC SEVOFLURANE, PER MIN: Performed by: OBSTETRICS & GYNECOLOGY

## 2025-04-25 PROCEDURE — 250N000013 HC RX MED GY IP 250 OP 250 PS 637: Performed by: ANESTHESIOLOGY

## 2025-04-25 PROCEDURE — 58662 LAPAROSCOPY EXCISE LESIONS: CPT | Mod: GC | Performed by: OBSTETRICS & GYNECOLOGY

## 2025-04-25 PROCEDURE — 81025 URINE PREGNANCY TEST: CPT | Performed by: OBSTETRICS & GYNECOLOGY

## 2025-04-25 PROCEDURE — 999N000040 HC STATISTIC CONSULT NO CHARGE VASC ACCESS

## 2025-04-25 PROCEDURE — 250N000011 HC RX IP 250 OP 636: Performed by: ANESTHESIOLOGY

## 2025-04-25 PROCEDURE — 250N000013 HC RX MED GY IP 250 OP 250 PS 637: Performed by: OBSTETRICS & GYNECOLOGY

## 2025-04-25 PROCEDURE — 88305 TISSUE EXAM BY PATHOLOGIST: CPT | Mod: 26 | Performed by: PATHOLOGY

## 2025-04-25 PROCEDURE — 999N000127 HC STATISTIC PERIPHERAL IV START W US GUIDANCE

## 2025-04-25 PROCEDURE — 710N000010 HC RECOVERY PHASE 1, LEVEL 2, PER MIN: Performed by: OBSTETRICS & GYNECOLOGY

## 2025-04-25 PROCEDURE — 370N000017 HC ANESTHESIA TECHNICAL FEE, PER MIN: Performed by: OBSTETRICS & GYNECOLOGY

## 2025-04-25 RX ORDER — FENTANYL CITRATE 50 UG/ML
50 INJECTION, SOLUTION INTRAMUSCULAR; INTRAVENOUS EVERY 5 MIN PRN
Status: DISCONTINUED | OUTPATIENT
Start: 2025-04-25 | End: 2025-04-25 | Stop reason: HOSPADM

## 2025-04-25 RX ORDER — FENTANYL CITRATE 50 UG/ML
25 INJECTION, SOLUTION INTRAMUSCULAR; INTRAVENOUS EVERY 5 MIN PRN
Status: DISCONTINUED | OUTPATIENT
Start: 2025-04-25 | End: 2025-04-25 | Stop reason: HOSPADM

## 2025-04-25 RX ORDER — LABETALOL HYDROCHLORIDE 5 MG/ML
10 INJECTION, SOLUTION INTRAVENOUS
Status: DISCONTINUED | OUTPATIENT
Start: 2025-04-25 | End: 2025-04-25 | Stop reason: HOSPADM

## 2025-04-25 RX ORDER — APREPITANT 40 MG/1
40 CAPSULE ORAL ONCE
Status: COMPLETED | OUTPATIENT
Start: 2025-04-25 | End: 2025-04-25

## 2025-04-25 RX ORDER — SODIUM CHLORIDE, SODIUM LACTATE, POTASSIUM CHLORIDE, CALCIUM CHLORIDE 600; 310; 30; 20 MG/100ML; MG/100ML; MG/100ML; MG/100ML
INJECTION, SOLUTION INTRAVENOUS CONTINUOUS
Status: DISCONTINUED | OUTPATIENT
Start: 2025-04-25 | End: 2025-04-25 | Stop reason: HOSPADM

## 2025-04-25 RX ORDER — ONDANSETRON 4 MG/1
4 TABLET, ORALLY DISINTEGRATING ORAL EVERY 30 MIN PRN
Status: DISCONTINUED | OUTPATIENT
Start: 2025-04-25 | End: 2025-04-25 | Stop reason: HOSPADM

## 2025-04-25 RX ORDER — ONDANSETRON 2 MG/ML
4 INJECTION INTRAMUSCULAR; INTRAVENOUS EVERY 30 MIN PRN
Status: DISCONTINUED | OUTPATIENT
Start: 2025-04-25 | End: 2025-04-25 | Stop reason: HOSPADM

## 2025-04-25 RX ORDER — ONDANSETRON 4 MG/1
4 TABLET, ORALLY DISINTEGRATING ORAL EVERY 8 HOURS PRN
Qty: 4 TABLET | Refills: 0 | Status: SHIPPED | OUTPATIENT
Start: 2025-04-25 | End: 2025-04-30

## 2025-04-25 RX ORDER — HYDROMORPHONE HYDROCHLORIDE 1 MG/ML
0.4 INJECTION, SOLUTION INTRAMUSCULAR; INTRAVENOUS; SUBCUTANEOUS EVERY 5 MIN PRN
Status: DISCONTINUED | OUTPATIENT
Start: 2025-04-25 | End: 2025-04-25 | Stop reason: HOSPADM

## 2025-04-25 RX ORDER — LIDOCAINE 40 MG/G
CREAM TOPICAL
Status: DISCONTINUED | OUTPATIENT
Start: 2025-04-25 | End: 2025-04-25 | Stop reason: HOSPADM

## 2025-04-25 RX ORDER — OXYCODONE HYDROCHLORIDE 5 MG/1
5 TABLET ORAL
Status: DISCONTINUED | OUTPATIENT
Start: 2025-04-25 | End: 2025-04-25 | Stop reason: HOSPADM

## 2025-04-25 RX ORDER — AMOXICILLIN 250 MG
1-2 CAPSULE ORAL 2 TIMES DAILY PRN
Qty: 30 TABLET | Refills: 0 | Status: SHIPPED | OUTPATIENT
Start: 2025-04-25 | End: 2025-04-30

## 2025-04-25 RX ORDER — BUPIVACAINE HYDROCHLORIDE 2.5 MG/ML
INJECTION, SOLUTION INFILTRATION; PERINEURAL PRN
Status: DISCONTINUED | OUTPATIENT
Start: 2025-04-25 | End: 2025-04-25 | Stop reason: HOSPADM

## 2025-04-25 RX ORDER — ACETAMINOPHEN 325 MG/1
975 TABLET ORAL ONCE
Status: COMPLETED | OUTPATIENT
Start: 2025-04-25 | End: 2025-04-25

## 2025-04-25 RX ORDER — KETOROLAC TROMETHAMINE 10 MG/1
10 TABLET, FILM COATED ORAL EVERY 6 HOURS PRN
Qty: 30 TABLET | Refills: 0 | Status: SHIPPED | OUTPATIENT
Start: 2025-04-25 | End: 2025-04-30

## 2025-04-25 RX ORDER — DEXAMETHASONE SODIUM PHOSPHATE 4 MG/ML
4 INJECTION, SOLUTION INTRA-ARTICULAR; INTRALESIONAL; INTRAMUSCULAR; INTRAVENOUS; SOFT TISSUE
Status: DISCONTINUED | OUTPATIENT
Start: 2025-04-25 | End: 2025-04-25 | Stop reason: HOSPADM

## 2025-04-25 RX ORDER — OXYCODONE HYDROCHLORIDE 5 MG/1
5 TABLET ORAL EVERY 4 HOURS PRN
Qty: 10 TABLET | Refills: 0 | Status: SHIPPED | OUTPATIENT
Start: 2025-04-25 | End: 2025-04-30

## 2025-04-25 RX ORDER — ACETAMINOPHEN 325 MG/1
975 TABLET ORAL ONCE
Status: DISCONTINUED | OUTPATIENT
Start: 2025-04-25 | End: 2025-04-25 | Stop reason: HOSPADM

## 2025-04-25 RX ORDER — NALOXONE HYDROCHLORIDE 0.4 MG/ML
0.1 INJECTION, SOLUTION INTRAMUSCULAR; INTRAVENOUS; SUBCUTANEOUS
Status: DISCONTINUED | OUTPATIENT
Start: 2025-04-25 | End: 2025-04-25 | Stop reason: HOSPADM

## 2025-04-25 RX ORDER — DIPHENHYDRAMINE HYDROCHLORIDE 50 MG/ML
25 INJECTION, SOLUTION INTRAMUSCULAR; INTRAVENOUS EVERY 6 HOURS PRN
Status: DISCONTINUED | OUTPATIENT
Start: 2025-04-25 | End: 2025-04-25 | Stop reason: HOSPADM

## 2025-04-25 RX ORDER — HYDROMORPHONE HYDROCHLORIDE 1 MG/ML
0.2 INJECTION, SOLUTION INTRAMUSCULAR; INTRAVENOUS; SUBCUTANEOUS EVERY 5 MIN PRN
Status: DISCONTINUED | OUTPATIENT
Start: 2025-04-25 | End: 2025-04-25 | Stop reason: HOSPADM

## 2025-04-25 RX ORDER — OXYCODONE HYDROCHLORIDE 5 MG/1
5 TABLET ORAL
Status: COMPLETED | OUTPATIENT
Start: 2025-04-25 | End: 2025-04-25

## 2025-04-25 RX ORDER — DIPHENHYDRAMINE HCL 25 MG
25 CAPSULE ORAL EVERY 6 HOURS PRN
Status: DISCONTINUED | OUTPATIENT
Start: 2025-04-25 | End: 2025-04-25 | Stop reason: HOSPADM

## 2025-04-25 RX ORDER — OXYCODONE HYDROCHLORIDE 10 MG/1
10 TABLET ORAL
Status: DISCONTINUED | OUTPATIENT
Start: 2025-04-25 | End: 2025-04-25 | Stop reason: HOSPADM

## 2025-04-25 RX ADMIN — HYDROMORPHONE HYDROCHLORIDE 0.2 MG: 1 INJECTION, SOLUTION INTRAMUSCULAR; INTRAVENOUS; SUBCUTANEOUS at 16:00

## 2025-04-25 RX ADMIN — ACETAMINOPHEN 975 MG: 325 TABLET, FILM COATED ORAL at 13:03

## 2025-04-25 RX ADMIN — OXYCODONE 5 MG: 5 TABLET ORAL at 17:00

## 2025-04-25 RX ADMIN — HYDROMORPHONE HYDROCHLORIDE 0.4 MG: 1 INJECTION, SOLUTION INTRAMUSCULAR; INTRAVENOUS; SUBCUTANEOUS at 16:31

## 2025-04-25 RX ADMIN — APREPITANT 40 MG: 40 CAPSULE ORAL at 13:04

## 2025-04-25 RX ADMIN — HYDROMORPHONE HYDROCHLORIDE 0.2 MG: 1 INJECTION, SOLUTION INTRAMUSCULAR; INTRAVENOUS; SUBCUTANEOUS at 17:15

## 2025-04-25 ASSESSMENT — ACTIVITIES OF DAILY LIVING (ADL)
ADLS_ACUITY_SCORE: 35

## 2025-04-25 NOTE — CONSULTS
"Consult received for Vascular access care.  See LDA for details. For additional needs place \"Nursing to Consult for Vascular Access\" [id number] order in EPIC.  "

## 2025-04-25 NOTE — DISCHARGE INSTRUCTIONS
To contact a doctor, call Dr Valenzuela  [phone] or:     [phone] and ask for the Resident On Call for:          Gynecology (answered 24 hours a day)   Emergency Departments:  [facility]: [phone]     [facility]: [phone]

## 2025-04-25 NOTE — BRIEF OP NOTE
[facility]    Brief Operative Note    Pre-operative diagnosis: Dysmenorrhea [N94.6]  Post-operative diagnosis Endometriosis     Procedure: LAPAROSCOPY,, N/A - Abdomen  EXCISION AND FULGURATION OF ENDOMETRIOSIS, N/A - Abdomen    Surgeon: Surgeons and Role:     * Ivette Valenzuela MD - Primary     * Ivette Fair MD - Resident - Assisting  Anesthesia: General   Estimated Blood Loss: 10 mL  IVF: 1000 mL   UOP: 150 mL     Specimens:   ID Type Source Tests Collected by Time Destination   1 : Right Abdominal Sidewall Tissue Abdomen SURGICAL PATHOLOGY EXAM Ivette Valenzuela MD 2025  3:12 PM    2 : Right Ovarian Fossa Tissue Abdomen SURGICAL PATHOLOGY EXAM Ivette Valenzuela MD 2025  3:14 PM    3 : right uterosacral Tissue Abdomen SURGICAL PATHOLOGY EXAM Ivette Valenzuela MD 2025  3:17 PM    4 : Left Abdominal Wall Tissue Abdomen SURGICAL PATHOLOGY EXAM Ivette Valenzuela MD 2025  3:19 PM      Findings: Exam under anesthesia reveals small anteverted uterus with moderate mobility. No palpable adnexal masses or fullness. No evidence of injury upon laparoscopic entry. Few white vesicular endometriotic implants on bilateral diaphragmatic hemispheres otherwise normal upper abdominal survey including normal liver edge, gallbladder and stomach. Visually normal small bowel and appendix. Evidence of parietal peritoneal implants of endometriosis includin. Powder burn lesions on right abdominal sidewall at pelvic brim 2. White vesicular lesion in right ovarian fossa 3. White vesicular lesion on left aspect of anterior abdominal wall. The aforementioned areas were all excised. Significant distortion of pelvic anatomy secondary to adhesive disease. Obliteration of anterior cul de sac with bladder and anterior abdominal wall adherent to anterior uterine wall to the level of the fundus. Sigmoid colon densely adherent to this  anterior uterine wall obscuring visualization of the left adnexa. Distal end of left fallopian tube and left ovary visualized under sigmoid colon in left ovarian fossa otherwise left fallopian tube unable to be seen. Left ovary visually normal. Right fallopian tube distended at cornua consistent with mild hydrosalpinx. Normal right ovary. Powder burn endometriotic implants along bilateral uterosacral ligaments- these areas were excised and fulgurated. All excision sites hemostatic at completion of the case.    Ivette Fair MD  Ob/Gyn PGY-3  04/25/25 3:49 PM

## 2025-04-25 NOTE — OP NOTE
[facility]  Operative Note  Name: Lexy Sinha  MRN: [id number]  : 1985     Date of Surgery: 2025    Surgeon: Ivette Valenzuela MD    Assistants: Ivette Fair MD PGY-3    Preoperative diagnosis:  Dysmenorrhea, concern for endometriosis     Postoperative diagnosis:  Severe endometriosis    Procedure(s): Pelvic exam under anesthesia, diagnostic laparoscopy, excision and fulguration of endometriosis, lysis of adhesions    Anesthesia: GETA    EBL: 10 mL  IVF: 43007 mL  UOP: 150 mL clear urine    Complications: None apparent    Specimens:   ID Type Source Tests Collected by Time Destination   1 : Right Abdominal Sidewall Tissue Abdomen SURGICAL PATHOLOGY EXAM Ivette Valenzuela MD 2025  3:12 PM     2 : Right Ovarian Fossa Tissue Abdomen SURGICAL PATHOLOGY EXAM Ivette Valenzuela MD 2025  3:14 PM     3 : right uterosacral Tissue Abdomen SURGICAL PATHOLOGY EXAM Ivette Valenzuela MD 2025  3:17 PM     4 : Left Abdominal Wall Tissue Abdomen SURGICAL PATHOLOGY EXAM Ivette Valenzuela MD 2025  3:19 PM       Indications: Leyx Sinha is a 40 year old  with a history of dysmenorrhea unresponsive to medical management including OCPs and IUD. Given clinical concern for endometriosis, above stated procedure was recommended.  Risks, benefits, and alternatives to the procedure were discussed with the patient who elected to proceed.  All questions were answered and an informed consent was obtained.    Findings:  Exam under anesthesia reveals small anteverted uterus with moderate mobility. No palpable adnexal masses or fullness. No evidence of injury upon laparoscopic entry. Few white vesicular endometriotic implants on bilateral diaphragmatic hemispheres otherwise normal upper abdominal survey including normal liver edge, gallbladder and stomach. Visually normal small bowel. Possible small powder burn lesion on appendix. Evidence of  parietal peritoneal implants of endometriosis includin. Powder burn lesion on right abdominal sidewall at pelvic brim 2. White vesicular lesion in right ovarian fossa with a window 3. White vesicular lesion on left aspect of anterior abdominal wall. The aforementioned areas were all excised and sent to pathology. Significant distortion of pelvic anatomy secondary to adhesive disease. Obliteration of anterior cul de sac with bladder and anterior abdominal wall adherent to anterior uterine wall to the level of the fundus on the left. Sigmoid colon densely adherent to this conglomerate at the left fundus obscuring visualization of the left adnexa. Using camera in right lower quadrant port, distal end of left fallopian tube and left ovary visualized under the sigmoid colon in the left ovarian fossa otherwise left fallopian tube unable to be seen. Left ovary visually normal. Right fallopian tube distended at cornua consistent with mild hydrosalpinx. Normal appearing right ovary. Powder burn endometriotic implants along bilateral uterosacral ligaments- these areas were excised and fulgurated. Adhesions of descending colon epiploica to left abdominal wall. All excision sites hemostatic at completion of the case. Right ureter visualized through the peritoneum and vermiculation noted. Unable to visualize left ureter due to extensive adhesions.     Procedure in detail:   The patient was taken to the operating room where GETA was administered and found to adequate. She was prepped and draped in usual sterile manner in the dorsal lithotomy position.  A time out was performed. SCDs were in place for VTE prophylaxis.     A wei catheter was placed. Attention was then turned to the abdomen where 0.25% Marcaine was used to infiltrate the inferior aspect of the umbilicus. An 11-blade scalpel was used to make a 5 mm vertical incision in the umbilicus. A 5 mm trocar was placed under direct visual guidance. The CO2 gas was  attached with an opening pressure of 1 mmHg.  The abdomen was insufflated to a pressure of 15 mmHg. An additional 5 mm port was placed in the right lower quadrant and 5 mm port in the left lower quadrant under direct visualization. The abdomen and pelvis were explored with the above noted findings.     Lysis of adhesions was performed to release the descending colon from the left anterior abdominal wall using the Ligasure device. Gently blunt lysis of adhesions was attempted in the area of the conglomerate of the sigmoid colon, left adnexa, and bladder however this area was densely adherent and the tissue was very friable so attempts to restore normal anatomy were aborted. The above stated endometriotic lesions on the peritoneum were excised by grasping with a blunt tipped grasped and sharply transected with south. The endometriosis implants along the uterosacral ligaments were in part excised. The remaining lesions were fulgurated using the Ligasure device.     The pelvis was irrigated. The surgical sites were inspected and hemostasis was observed. The abdomen was desufflated and the ports were removed. The skin of port sites was closed with 4-0 Vicryl and dermabond. The wei catheter was removed.     Sponge and needle counts were correct x2. The patient tolerated the procedure well and was taken to the recovery area in stable condition. Dr. Valenzuela was present and scrubbed for the procedure.    Ivette Fair MD  Ob/Gyn PGY-3  04/25/25 4:00 PM      ATTESTATION:   I was scrubbed and present for the entire procedure. I agree with the above note which I have edited to reflect my findings.   Ivette Valenzuela MD

## 2025-04-27 ENCOUNTER — OFFICE VISIT (OUTPATIENT)
Dept: URGENT CARE | Facility: URGENT CARE | Age: 40
End: 2025-04-27
Payer: COMMERCIAL

## 2025-04-27 ENCOUNTER — NURSE TRIAGE (OUTPATIENT)
Dept: NURSING | Facility: CLINIC | Age: 40
End: 2025-04-27
Payer: COMMERCIAL

## 2025-04-27 VITALS
DIASTOLIC BLOOD PRESSURE: 74 MMHG | HEART RATE: 77 BPM | BODY MASS INDEX: 26.26 KG/M2 | RESPIRATION RATE: 18 BRPM | TEMPERATURE: 98.3 F | WEIGHT: 153 LBS | OXYGEN SATURATION: 96 % | SYSTOLIC BLOOD PRESSURE: 111 MMHG

## 2025-04-27 DIAGNOSIS — J02.9 ACUTE PHARYNGITIS, UNSPECIFIED ETIOLOGY: Primary | ICD-10-CM

## 2025-04-27 DIAGNOSIS — R11.0 NAUSEA: ICD-10-CM

## 2025-04-27 PROCEDURE — 3074F SYST BP LT 130 MM HG: CPT | Performed by: FAMILY MEDICINE

## 2025-04-27 PROCEDURE — 99214 OFFICE O/P EST MOD 30 MIN: CPT | Performed by: FAMILY MEDICINE

## 2025-04-27 PROCEDURE — 3078F DIAST BP <80 MM HG: CPT | Performed by: FAMILY MEDICINE

## 2025-04-27 RX ORDER — PREDNISONE 20 MG/1
40 TABLET ORAL DAILY
Qty: 6 TABLET | Refills: 0 | Status: SHIPPED | OUTPATIENT
Start: 2025-04-27 | End: 2025-04-30

## 2025-04-27 RX ORDER — ONDANSETRON 4 MG/1
4 TABLET, ORALLY DISINTEGRATING ORAL EVERY 8 HOURS PRN
Qty: 10 TABLET | Refills: 0 | Status: SHIPPED | OUTPATIENT
Start: 2025-04-27

## 2025-04-27 NOTE — PROGRESS NOTES
Assessment & Plan     Acute pharyngitis, unspecified etiology  - predniSONE (DELTASONE) 20 MG tablet  Dispense: 6 tablet; Refill: 0    Nausea  - ondansetron (ZOFRAN ODT) 4 MG ODT tab  Dispense: 10 tablet; Refill: 0     No evidence of abscess cellulitis or retropharyngeal infection.  Low suspicion for epiglottitis based on the reported history and examination.     We discussed likely has inflammation from recent intubation/extubation (laryngeal edema) although showing no red flag symptoms at this time --  thus we will try prednisone for a few days to help with inflammation I have recommended that she start ibuprofen at anti-inflammatory doses. Vitals reassuring.     See AVS summary for additional recommendations reviewed with patient during this visit.     Kel Aragon MD   [facility]    Ella Pugh is a 40 year old female who presents to clinic today for the following health issues:  Chief Complaint   Patient presents with    Pharyngitis     X since  Friday   Reports surgery on Friday, sore throat afterwards  Today worse   White patches on left side   Denies fevers or exposure     HPI  About 40 hours ago patient had procedure done for endometriosis she was under general anesthesia and was intubated since being extubated she has had ongoing left lower throat discomfort which makes swallowing painful.  No difficulty with opening her mouth.  Voice is slightly hoarse    No recent history of strep throat.  No fevers, headache or bodyaches or other rash present.  No exposures to strep throat.    She also has ongoing nausea from the procedure she reports no acute abdominal pain.    She was given narcotic prescription oxycodone to help with her postprocedural pain this has been helpful also with her left sided throat discomfort.    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 Added automatically from request for surgery [id number]      Nephrolithiasis 02/17/2016     Priority: Medium    Dysmenorrhea 11/13/2012     Priority: Medium    Vaginismus 11/13/2012     Priority: Medium       Current Outpatient Medications   Medication Sig Dispense Refill    clindamycin (CLEOCIN T) 1 % external solution Apply topically 2 times daily 60 mL 1    cycloSPORINE (RESTASIS) 0.05 % ophthalmic emulsion Place 1 drop into both eyes 2 times daily. 60 each 11    insulin syringe-needle U-100 (31G X 5/16\" 0.5 ML) 31G X 5/16\" 0.5 ML miscellaneous Use 1 syringe weekly or as directed with semaglutide rx. 10 each 3    ketorolac (TORADOL) 10 MG tablet Take 1 tablet (10 mg) by mouth every 6 hours as needed for moderate pain. 30 tablet 0    ondansetron (ZOFRAN ODT) 4 MG ODT tab Take 1 tablet (4 mg) by mouth every 8 hours as needed for nausea. 10 tablet 0    ondansetron (ZOFRAN ODT) 4 MG ODT tab Take 1 tablet (4 mg) by mouth every 8 hours as needed for nausea. 4 tablet 0    oxyCODONE (ROXICODONE) 5 MG tablet Take 1 tablet (5 mg) by mouth every 4 hours as needed for moderate to severe pain. 10 tablet 0    predniSONE (DELTASONE) 20 MG tablet Take 2 tablets (40 mg) by mouth daily for 3 days. 6 tablet 0    Semaglutide-Weight Management (WEGOVY) 1 MG/0.5ML pen Inject 1 mg subcutaneously once a week. 2 mL 5    senna-docusate (SENOKOT-S/PERICOLACE) 8.6-50 MG tablet Take 1-2 tablets by mouth 2 times daily as needed for constipation (While on oral opioids.). 30 tablet 0     No current facility-administered medications for this visit.           Objective    /74   Pulse 77   Temp 98.3  F (36.8  C) (Temporal)   Resp 18   Wt 69.4 kg (153 lb)   LMP 04/23/2025 (Exact Date)   SpO2 96%   BMI 26.26 kg/m    Physical Exam   As noted above and including:   GEN: NAD, MMM, good " historian  No large tonsils, no present exudates, no lesions or ulcers.  No trismus and uvula midline  Neck: no enlarged masses or lymphadenopathy  CV: HDS  Pulm: non-labored, absent of stridor, no audible wheezing. No tripoding.   No results found for any visits on 04/27/25.                  The use of Dragon/Flashstock dictation services may have been used to construct the content in this note; any grammatical or spelling errors are non-intentional. Please contact the author of this note directly if you are in need of any clarification.

## 2025-04-27 NOTE — PROGRESS NOTES
Urgent Care Clinic Visit    Chief Complaint   Patient presents with    Pharyngitis     X Friday   Today worse   White patches on left side   Denies fevers                4/27/2025     1:26 PM   Additional Questions   Roomed by tea thomson   Accompanied by      No  Does the patient have a sore throat and either history of fever >100.4 in the previous 24 hours without a cough or recent exposure to a known case of strep throat? No

## 2025-04-27 NOTE — PATIENT INSTRUCTIONS
Ibuprofen 600 mg every 6 hours as needed for throat discomfort      Stay hydrated drink 70 ounces of water/fluids a day      Prednisone 40 mg a day for the next 3 days to help with inflammation      If symptoms worsen we have difficulty with swallowing, difficulty opening her mouth, new fever, neck swelling or difficulty with moving the neck --then please seek immediate medical attention      If no significant improvement by Tuesday morning please reach out to your doctors office

## 2025-04-27 NOTE — TELEPHONE ENCOUNTER
Pt's spouse calling with pt. Verbal consent given to communicate. Pt had enodmetriosis, laparoscopic surgery on Friday,   Post surgery on Friday pt had some throat discomfort from the breathing tube and today throat pain have worsen. Pain is more on left side of throat. Denies cold symptoms or fever. Back of throat on Left side is red and irritated, has some red and white pimple  Denies problem with breathing,     Reports no concerns with Surgery incision site.    Triage see HCP within 24 hours, care advice given    Kendal Kilgore, RN, BSN  4/27/2025 at 11:31 AM  [facility]      Reason for Disposition   SEVERE (e.g., excruciating) throat pain    Additional Information   Negative: SEVERE difficulty breathing (e.g., struggling for each breath, speaks in single words, stridor)   Negative: Sounds like a life-threatening emergency to the triager   Negative: [1] Drooling or spitting out saliva (because can't swallow) AND [2] normal breathing   Negative: Unable to open mouth completely   Negative: [1] Difficulty breathing AND [2] not severe   Negative: Fever > 104 F (40 C)   Negative: [1] Refuses to drink anything AND [2] for > 12 hours   Negative: [1] Drinking very little AND [2] dehydration suspected (e.g., no urine > 12 hours, very dry mouth, very lightheaded)   Negative: Patient sounds very sick or weak to the triager    Protocols used: Sore Throat-A-

## 2025-04-27 NOTE — PROGRESS NOTES
Medication Therapy Management (MTM) Encounter    ASSESSMENT:                            Medication Adherence/Access: No issues identified    Weight Management:   Patient would benefit from : patient doing well --ok to keep self titrating semaglutide weekly dose until last vial is finished--then convert (see plan) to sublingual daily version --see plan for details.      PLAN:                              1. Hope your recovering well from recent surgery --and you've restarted weekly semaglutide weekly dose- ok to keep ramping up from 10-20 units /week as tolerated--after you finish the last vial of semaglutide --see information below- I sent in rx for DAILY under the tongue version of Semaglutide --if you decide to fill this rx call [facility] pharmacy at [phone] .  Start at daily dose of 0.5ml=1mg./day dose and in 2-4 weeks as tolerated you can increase daily dose to 0.75ml =1.5mg./day dose.    We can increase dose every 2-4 weeks  from there as needed going forward.        Sublingual Semaglutide at [facility] is now offering sublingual semaglutide. This is an alternative option for patients who may not have access to or insurance coverage for commercially available semaglutide products.  Currently, the FDA-approved forms of semaglutide include:  Rybelsus (oral tablet) - approved for Type 2 Diabetes  Ozempic (injectable) - approved for Type 2 Diabetes  Wegovy (injectable) - approved for chronic weight management and cardiovascular risk reduction in certain populations  Unlike FDA-approved products, sublingual semaglutide has not been studied in clinical trials, so its effectiveness and safety in this form are not fully known. However, it may be a helpful alternative when other formulations are not accessible.    Sublingual means the medication is placed under the tongue, where it dissolves and is absorbed directly into the bloodstream. At [facility], sublingual semaglutide is made by combining commercially available Rybelsus tablets with a base called Submagna to create a liquid that can be absorbed under the tongue.    Compounding is permitted when a medication is not available in the needed form, like sublingual semaglutide, and a provider writes a prescription for a specific patient. [facility] follows strict safety and quality standards, including United [place] Pharmacopeia (USP) 795 guidelines for non-sterile compounding. Since sublingual semaglutide is not available commercially, it is eligible for compounding under these guidelines.     Availability:  Sublingual semaglutide is expected to be available long term.    Dosing:  Available doses include 0.5 mg, 1 mg, 1.5 mg, 2 mg and 2.5 mg. Further dose escalation above 2.5 mg can be can discussed with your provider based on your individual response and if appropriate.     Sublingual Semaglutide 2 mg/mL   mg mL   0.5 mg 0.25 mL   1 mg 0.5 mL   1.5 mg 0.75 mL   2 mg 1 mL   2.5 mg 1.25 mL     New Start: Begin with 0.5 mg (0.25 mL) once daily for 2-4 weeks. If tolerated, increase to 1 mg (0.5 mL) once daily for at least 4 weeks. If necessary thereafter, dose can be increased by 0.5 mg (0.25 mL) every 4 weeks (e.g. 1.5 mg, then 2 mg, then 2.5 mg). Follow the dosing and escalation recommendations prescribed by your provider. Do not escalate dosing further than prescribed by your provider.     From injectable Semaglutide: There are no official studies comparing injectable and sublingual semaglutide doses. If you are switching from injectable to sublingual semaglutide, your provider will help choose the most appropriate dose for you.    Administration:  Sublingual semaglutide is administered once daily. Flavor is spearmint similar to [place] double mint gum or spearmint toothpaste.     Shake well before each use (product has a thick, honey-like consistency)  Use syringe to draw your dose  from bottle   Place medication under your tongue and hold under tongue a long as possible (at least 60-90 seconds), then swallow  If the volume feels like too much, you may split the dose into two parts, taken 5-10 minutes apart  Avoid eating, drinking, or smoking for 30 minutes afterward    Helpful Routine Tip: Brush your teeth and rinse your mouth, take your medication, then shower or get ready for the day.    Storage:  Store at room temperature. The medication remains stable for 90 days.    Common Side Effects:  Side effects may be similar to FDA-approved semaglutide products (Rybelsus, Wegovy, Ozempic), though the sublingual version has not been formally studied. Common side effects include: nausea, diarrhea, constipation, headache, indigestion, tiredness (fatigue), stomach upset or abdominal pain. Less commonly, semaglutide can cause low blood sugar (symptoms: shaky, dizzy, sweaty, agitation). Contact your care team if side effects are bothersome or unmanageable or if you are concerned for low blood sugar.     Tips to reduce side effects:   Eat regular meals (don t skip meals)  Stop eating when feeling satiated/satisfied.  Stay hydrated--aim for at least 64 oz of water daily    Obtaining Medication From Pharmacy:   You will receive an automated call once the pharmacy receives your prescription. You must call back and speak to a team member to confirm name, product, shipping, and cost. If you have not received a call within 3 business days, call the pharmacy directly.    [facility] Phone:  [phone]    Shipping available to: [place], [place], [place], [place], [place], [place], [place].     Managing Refills:  Call [phone]   Download the [employer] brett Download  Online:  [email or web address]    Cost:   0.5 mg and 1 mg doses  ~$180 for 30-day supply  ~$450 for 90-day supply   1.5 mg dose  ~$200-$300 for a 30-day supply   May have cost savings for 90 day supply  2 mg dose  ~$300 for a 30-day  supply  May have cost savings for 90 day supply  Dosing higher than 2 mg will be determine by [facility]          Follow-up: Return in about 6 weeks (around 6/11/2025), or @1:30 Pm via telephone, for Medication Therapy Management Visit, Weight loss.    SUBJECTIVE/OBJECTIVE:                          Lexy Sinha is a 40 year old female called for a follow-up (1-29-25 )visit. She was referred to me from Perico Gaston  .      Reason for visit:   Fv cmpd semaglutide f/up      Allergies/ADRs: Reviewed in chart  Past Medical History: Reviewed in chart  Tobacco: She reports that she has never smoked. She has never been exposed to tobacco smoke. She has never used smokeless tobacco.  Alcohol: Less than 1 beverages / week  E-cigarette Use: none , no MJ use.  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  Personal Healthcare Goals: 130lbs.--goal wt.before covid, was on ozempic then.  Activity: pickleball but injures her ankle , take it easy right now.      Medication Adherence/Access: no issues reported    Weight Management (has OCD as well):  Fv cmpd. Semaglutide = 20 units /week --but stopped it for  endometriosis surgery --now has restarted last vial of semaglutide at 10 units /week dose and will ramp up weekly until vial is finished  and doing well last clinic weight with shoes =153lbs.--probably -148lbs. at home.      Eats proteins first, lots of water daily. Just started at gym - [employer] - started strength training now.          Previously :   Current semaglutide weekly dose is 1mg. --3rd dose this week .  Home weight is 146lbs. --feels good now.    Eats proteins first, lots of water daily. Just started at gym - [employer] - started strength training now.  Previously :   Semaglutide 1mg./week- doing well -but not losing because off x 2 weeks expecting surgery   Home weight today =150lbs.   Eats  "proteins first, lots of water daily.  Exercise--lots of pickleball. No strength training yet. Looking for a gym that is close to her.    Previously :   Semaglutide 0.5mg./week-- home weight today = 149.8lbs but 8-21-24 146.6lbs --she is having period right now.  Will pause med x 1 week for elective surgery.  Takes 1 tab culturelle daily --much easier on GI system(95% better).      Previously :   Semaglutide 0.25mg./week-home weight 148lbs.-- today will receive semaglutide 0.5mg ./week dose --she injects on Thursdays.   Tiny bit of nausea/loose stool --very manageable.  Eats proteins first, lots of water daily.  Exercise--lots of pickleball.      Off all phentermine now. 7-5-24.    Previously :  Phentermine 37.5 mg -1/2 tab /day all she could tolerate. (Anxiety on full tab).  Ozempic 0.5mg./week-was on it for wt.loss and too stop food noise -loved it but HP ins. Cut her off it -none since mid march 2024.  Patient reports no current medication side effects. When on Ozempic loose stools and heartburn)  Nutrition/Eating Habits:   Exercise/Activity: pickleball if ankle not injured.  Medication History:  Phentermine:    Ozempic: 0.5mg/week worked well  when      Wt Readings from Last 4 Encounters:   04/27/25 153 lb (69.4 kg)   04/25/25 150 lb 12.7 oz (68.4 kg)   04/04/25 151 lb 4.8 oz (68.6 kg)   02/18/25 150 lb (68 kg)     Estimated body mass index is 26.26 kg/m  as calculated from the following:    Height as of 4/25/25: 5' 4\" (1.626 m).    Weight as of 4/27/25: 153 lb (69.4 kg).      ------------------------------------------------------------------------------------------------------------------      I spent 15 minutes with this patient today. All changes were made via collaborative practice agreement with Perico Gaston MD. A copy of the visit note was provided to the patient's provider(s).    A summary of these recommendations was sent via [employer].    Abby Srinivasan Rph.  Medication Therapy Management " Provider  [phone]      Telemedicine Visit Details  The patient's medications can be safely assessed via a telemedicine encounter.  Type of service:  Telephone visit  Originating Location (pt. Location): Home    Distant Location (provider location):  Off-site  Start Time: 11:00 AM  End Time: 11:15AM     Medication Therapy Recommendations  Overweight (BMI 25.0-29.9)   1 Current Medication: COMPOUNDED NON-CONTROLLED SUBSTANCE (CMPD RX) - PHARMACY TO MIX COMPOUNDED MEDICATION   Current Medication Sig: Compound Sublingual semaglutide 2mg./ml -Take 0.5ml (1mg.) SL daily x 2-4 weeks , then as tolerated increase daily dose to 0.75ml(1.5mg) daily.   Rationale: Dosage form inappropriate - Ineffective medication - Effectiveness   Recommendation: Change Medication Formulation  - converting from weekly semaglutide to daily sublingual version.   Status: Accepted per CPA   Identified Date: 4/30/2025 Completed Date: 4/30/2025

## 2025-04-28 ENCOUNTER — TELEPHONE (OUTPATIENT)
Dept: OPTOMETRY | Facility: CLINIC | Age: 40
End: 2025-04-28

## 2025-04-28 NOTE — TELEPHONE ENCOUNTER
[facility] Call Center    Phone Message    May a detailed message be left on voicemail: yes     Reason for Call: Other: Patient called needing to reschedule her visit for tomorrow as she is having a fitting and lens . Please call to advise.      Action Taken: Message routed to:  [facility] ([facility] eye    Travel Screening: Not Applicable

## 2025-04-28 NOTE — TELEPHONE ENCOUNTER
Called and spoke to Lexy     R/S her appointment with Dr. MCKEON for next available ( June ) Lexy was ok with time / date - I added appointment to the wait list     Vita Liu Communication Facilitator on 4/28/2025 at 9:58 AM

## 2025-04-29 ENCOUNTER — TELEPHONE (OUTPATIENT)
Dept: OBGYN | Facility: CLINIC | Age: 40
End: 2025-04-29
Payer: COMMERCIAL

## 2025-04-29 NOTE — TELEPHONE ENCOUNTER
Lexy Sinha is a 40 year old who underwent diagnostic laparoscopy for endometriosis on 4/25. I called patient to follow up to see how she is feeling after the procedure. No answer, left voicemail.  Looks like her post op appt needs to be rescheduled.     Ivette Valenzuela MD

## 2025-04-30 ENCOUNTER — VIRTUAL VISIT (OUTPATIENT)
Dept: PHARMACY | Facility: CLINIC | Age: 40
End: 2025-04-30
Payer: COMMERCIAL

## 2025-04-30 DIAGNOSIS — E66.3 OVERWEIGHT (BMI 25.0-29.9): Primary | ICD-10-CM

## 2025-04-30 LAB
PATH REPORT.COMMENTS IMP SPEC: NORMAL
PATH REPORT.COMMENTS IMP SPEC: NORMAL
PATH REPORT.FINAL DX SPEC: NORMAL
PATH REPORT.GROSS SPEC: NORMAL
PATH REPORT.MICROSCOPIC SPEC OTHER STN: NORMAL
PATH REPORT.RELEVANT HX SPEC: NORMAL
PHOTO IMAGE: NORMAL

## 2025-04-30 PROCEDURE — 99606 MTMS BY PHARM EST 15 MIN: CPT | Mod: 93 | Performed by: PHARMACIST

## 2025-04-30 NOTE — PATIENT INSTRUCTIONS
Recommendations from today's MTM visit:                                                         1. Hope your recovering well from recent surgery --and you've restarted weekly semaglutide weekly dose- ok to keep ramping up from 10-20 units /week as tolerated--after you finish the last vial of semaglutide --see information below- I sent in rx for DAILY under the tongue version of Semaglutide --if you decide to fill this rx call [facility] pharmacy at [phone] .  Start at daily dose of 0.5ml=1mg./day dose and in 2-4 weeks as tolerated you can increase daily dose to 0.75ml =1.5mg./day dose.    We can increase dose every 2-4 weeks  from there as needed going forward.        Sublingual Semaglutide at [facility] is now offering sublingual semaglutide. This is an alternative option for patients who may not have access to or insurance coverage for commercially available semaglutide products.  Currently, the FDA-approved forms of semaglutide include:  Rybelsus (oral tablet) - approved for Type 2 Diabetes  Ozempic (injectable) - approved for Type 2 Diabetes  Wegovy (injectable) - approved for chronic weight management and cardiovascular risk reduction in certain populations  Unlike FDA-approved products, sublingual semaglutide has not been studied in clinical trials, so its effectiveness and safety in this form are not fully known. However, it may be a helpful alternative when other formulations are not accessible.    Sublingual means the medication is placed under the tongue, where it dissolves and is absorbed directly into the bloodstream. At [facility], sublingual semaglutide is made by combining commercially available Rybelsus tablets with a base called Submagna to create a liquid that can be absorbed under the tongue.    Compounding is permitted when a medication is not available in the needed form, like sublingual semaglutide, and a provider writes a  prescription for a specific patient. [facility] follows strict safety and quality standards, including United [place] Pharmacopeia (USP) 795 guidelines for non-sterile compounding. Since sublingual semaglutide is not available commercially, it is eligible for compounding under these guidelines.     Availability:  Sublingual semaglutide is expected to be available long term.    Dosing:  Available doses include 0.5 mg, 1 mg, 1.5 mg, 2 mg and 2.5 mg. Further dose escalation above 2.5 mg can be can discussed with your provider based on your individual response and if appropriate.     Sublingual Semaglutide 2 mg/mL   mg mL   0.5 mg 0.25 mL   1 mg 0.5 mL   1.5 mg 0.75 mL   2 mg 1 mL   2.5 mg 1.25 mL     New Start: Begin with 0.5 mg (0.25 mL) once daily for 2-4 weeks. If tolerated, increase to 1 mg (0.5 mL) once daily for at least 4 weeks. If necessary thereafter, dose can be increased by 0.5 mg (0.25 mL) every 4 weeks (e.g. 1.5 mg, then 2 mg, then 2.5 mg). Follow the dosing and escalation recommendations prescribed by your provider. Do not escalate dosing further than prescribed by your provider.     From injectable Semaglutide: There are no official studies comparing injectable and sublingual semaglutide doses. If you are switching from injectable to sublingual semaglutide, your provider will help choose the most appropriate dose for you.    Administration:  Sublingual semaglutide is administered once daily. Flavor is spearmint similar to [place] double mint gum or spearmint toothpaste.     Shake well before each use (product has a thick, honey-like consistency)  Use syringe to draw your dose from bottle   Place medication under your tongue and hold under tongue a long as possible (at least 60-90 seconds), then swallow  If the volume feels like too much, you may split the dose into two parts, taken 5-10 minutes apart  Avoid eating, drinking, or smoking for 30 minutes afterward    Helpful Routine Tip:  Brush your teeth and rinse your mouth, take your medication, then shower or get ready for the day.    Storage:  Store at room temperature. The medication remains stable for 90 days.    Common Side Effects:  Side effects may be similar to FDA-approved semaglutide products (Rybelsus, Wegovy, Ozempic), though the sublingual version has not been formally studied. Common side effects include: nausea, diarrhea, constipation, headache, indigestion, tiredness (fatigue), stomach upset or abdominal pain. Less commonly, semaglutide can cause low blood sugar (symptoms: shaky, dizzy, sweaty, agitation). Contact your care team if side effects are bothersome or unmanageable or if you are concerned for low blood sugar.     Tips to reduce side effects:   Eat regular meals (don t skip meals)  Stop eating when feeling satiated/satisfied.  Stay hydrated--aim for at least 64 oz of water daily    Obtaining Medication From Pharmacy:   You will receive an automated call once the pharmacy receives your prescription. You must call back and speak to a team member to confirm name, product, shipping, and cost. If you have not received a call within 3 business days, call the pharmacy directly.    [facility] Phone:  [phone]    Shipping available to: [place], [place], [place], [place], [place], [place], [place].     Managing Refills:  Call [phone]   Download the [employer] brett Download  Online:  [email or web address]    Cost:   0.5 mg and 1 mg doses  ~$180 for 30-day supply  ~$450 for 90-day supply   1.5 mg dose  ~$200-$300 for a 30-day supply   May have cost savings for 90 day supply  2 mg dose  ~$300 for a 30-day supply  May have cost savings for 90 day supply  Dosing higher than 2 mg will be determine by [facility]          Follow-up: Return in about 6 weeks (around 6/11/2025), or @1:30 Pm via telephone, for Medication Therapy Management Visit, Weight loss.    It was great speaking with you today.  I value your  "experience and would be very thankful for your time in providing feedback in our clinic survey. In the next few days, you may receive an email or text message from [facility] [employer] with a link to a survey related to your  clinical pharmacist.\"     To schedule another MTM appointment, please call the clinic directly or you may call the MTM scheduling line at [phone] or toll-free at [phone].     My Clinical Pharmacist's contact information:                                                      Please feel free to contact me with any questions or concerns you have.      Abby Srinivasan Rph.  Medication Therapy Management Provider  [phone]    "

## 2025-05-06 ENCOUNTER — VIRTUAL VISIT (OUTPATIENT)
Dept: OBGYN | Facility: CLINIC | Age: 40
End: 2025-05-06
Payer: COMMERCIAL

## 2025-05-06 DIAGNOSIS — N80.9 ENDOMETRIOSIS: Primary | ICD-10-CM

## 2025-05-06 PROCEDURE — 99024 POSTOP FOLLOW-UP VISIT: CPT | Mod: 93 | Performed by: OBSTETRICS & GYNECOLOGY

## 2025-05-06 NOTE — Clinical Note
Can you call Dr. Glen Kruse's office and find out if pts need a formal referral order to see him for an endometriosis surgery consult ? If so can you put in an order for this pt and give her scheduling instructions? Thanks Ivette Valenzuela MD

## 2025-05-06 NOTE — PROGRESS NOTES
"Lexy is a 40 year old who is being evaluated via a billable telephone visit.    What phone number would you like to be contacted at? [phone]   How would you like to obtain your AVS? MyChart  Originating Location (pt. Location): Home    Distant Location (provider location):  On-site  Telephone visit completed due to the patient did not consent to a video visit.    Assessment & Plan     Endometriosis  Discussed intra operative findings of stage 4 endometriosis  Discussed medication management versus referral to a specialist for surgical management (Dr. Glen Kruse at [place] or Dr. Amaya Tejada at [place])  Will send referral if needed for Dr. Kruse      BMI  Estimated body mass index is 26.26 kg/m  as calculated from the following:    Height as of 25: 1.626 m (5' 4\").    Weight as of 25: 69.4 kg (153 lb).     Ivette Valenzuela MD       Subjective   Lexy is a 40 year old, presenting for the following health issues:  Follow Up (2025, LAPAROSCOPY,)    HPI    Lexy Sinha is a 40 year old   who is 2 weeks post op status post Pelvic exam under anesthesia, diagnostic laparoscopy, excision and fulguration of endometriosis, lysis of adhesions on .     Overall doing okay  Traveled for work   Still having some pain, pain meds help  Low appetite but able to eat, occ nausea, no vomiting  Incisions are scabbing over  Able to go to the bathroom and ambulate without difficulty    Tried in the past: OCPs, IUD, patch in the past. An IUD (maybe 5yrs ago) was \"horrific\", was extremely painful the entire time.     A. Right Abdominal Sidewall, biopsy:  - Fibroadipose tissue with endometriosis  - Negative for malignancy     B. Right Ovarian Fossa, biopsy:  - Fibrovascular tissue with no significant histologic abnormality      C. Right uterosacral, biopsy:  - Fibrovascular tissue and mesothelium with no significant histologic abnormality      D. Left Abdominal Wall, biopsy:  - Fibroadipose tissue with " endometriosis  - Negative for malignancy                  Objective           Vitals:  No vitals were obtained today due to virtual visit.    Physical Exam   General: Alert and no distress //Respiratory: No audible wheeze, cough, or shortness of breath // Psychiatric:  Appropriate affect, tone, and pace of words            Phone call duration: 20 minutes (11:11-11:31)  Signed Electronically by: Ivette Valenzuela MD

## 2025-05-06 NOTE — PATIENT INSTRUCTIONS
[place]   [email or web address]    [place]   [email or web address]      Glen Kruse DO  Located in: [facility]  Address: [address] [place] [address] [address], [place] [address]  Phone: [phone]      [email or web address]  GABRIELE BravoBSandroS.  Gynecologist  Reproductive Endocrinologist      [place], [place]  Central Appointment Office  [phone]  7 a.m. to 6 p.m. Central time  Monday through Friday

## 2025-05-07 ENCOUNTER — MYC MEDICAL ADVICE (OUTPATIENT)
Dept: OBGYN | Facility: CLINIC | Age: 40
End: 2025-05-07
Payer: COMMERCIAL

## 2025-05-07 DIAGNOSIS — N94.6 DYSMENORRHEA: Primary | ICD-10-CM

## 2025-05-07 NOTE — TELEPHONE ENCOUNTER
Received message from Dr. Nicolas Valenzuela, Ivette Ortez MD  [facility] Triage  Can you call Dr. Glen Kruse's office and find out if pts need a formal referral order to see him for an endometriosis surgery consult ? If so can you put in an order for this pt and give her scheduling instructions?    RN called -Dr. Glen Kruse's office-works for [facility]t [facility]   They do not require referral to schedule pt for consult but MD is currently booking out 5-6 months for endometriosis patients (he only sees so many per day) his schedule is released to August and is all booked but pt can call to get added to wait list to schedule when his new openings open up    Clio message sent to pt with scheduling information/above info.   Mariana Gill RN on 5/7/2025 at 1:17 PM

## 2025-05-09 NOTE — TELEPHONE ENCOUNTER
BRIAN completed by pt for Eric GUZMAN  BRIAN faxed to imaging department to push imaging  BRIAN and records of OV, labs, imaging report [facility] and pt updated.   Mariana Gill RN on 5/9/2025 at 10:25 AM

## 2025-05-12 RX ORDER — KETOROLAC TROMETHAMINE 10 MG/1
10 TABLET, FILM COATED ORAL EVERY 6 HOURS PRN
Qty: 30 TABLET | Refills: 4 | Status: SHIPPED | OUTPATIENT
Start: 2025-05-12

## 2025-05-12 NOTE — TELEPHONE ENCOUNTER
Rx for ketorolac sent with refills  1. Dysmenorrhea (Primary)    - ketorolac (TORADOL) 10 MG tablet; Take 1 tablet (10 mg) by mouth every 6 hours as needed for moderate pain.  Dispense: 30 tablet; Refill: 4      /Ivette Valenzuela MD

## 2025-05-19 ENCOUNTER — OFFICE VISIT (OUTPATIENT)
Dept: OBGYN | Facility: CLINIC | Age: 40
End: 2025-05-19
Payer: COMMERCIAL

## 2025-05-19 VITALS
TEMPERATURE: 98.2 F | SYSTOLIC BLOOD PRESSURE: 124 MMHG | WEIGHT: 145 LBS | HEART RATE: 86 BPM | DIASTOLIC BLOOD PRESSURE: 86 MMHG | BODY MASS INDEX: 24.89 KG/M2

## 2025-05-19 DIAGNOSIS — N80.9 ENDOMETRIOSIS: Primary | ICD-10-CM

## 2025-05-19 DIAGNOSIS — N94.19 DYSPAREUNIA DUE TO MEDICAL CONDITION IN FEMALE: ICD-10-CM

## 2025-05-19 PROCEDURE — 3074F SYST BP LT 130 MM HG: CPT | Performed by: OBSTETRICS & GYNECOLOGY

## 2025-05-19 PROCEDURE — 99213 OFFICE O/P EST LOW 20 MIN: CPT | Mod: 24 | Performed by: OBSTETRICS & GYNECOLOGY

## 2025-05-19 PROCEDURE — 3079F DIAST BP 80-89 MM HG: CPT | Performed by: OBSTETRICS & GYNECOLOGY

## 2025-05-19 RX ORDER — GABAPENTIN 100 MG/1
100 CAPSULE ORAL 3 TIMES DAILY PRN
Qty: 60 CAPSULE | Refills: 3 | Status: SHIPPED | OUTPATIENT
Start: 2025-05-19

## 2025-05-19 NOTE — PROGRESS NOTES
GYN Clinic Visit  2025  CC:  Endometriosis    HPI:  Lexy Sinha is a 40 year old  who presents 4 weeks status post Pelvic exam under anesthesia, diagnostic laparoscopy, excision and fulguration of endometriosis, lysis of adhesions on .     Still having some pain in LLQ. Feeling tired.     Ketorolac and tylenol for last menses.     Stopped gluten, sugar and dairy. Appetite lower. On semaglutide.   Bowel movements are tough. Used to have fecal urgency and loose stool. Now stool is harder. Massaging left side helps.     Pain with period and sex. H/o pelvic floor dysfunction and vaginal pain in addition to deep dyspareunia. Interested in working with pelvic floor PT again.     She has appt with Dr. Kruse in Aug and prefers to not start any hormonal or hormone blocker meds at this time.     Reviewed PMH, PSH, Meds and allergies.    Objective:  Vitals:    25 0959   BP: 124/86   Pulse: 86   Temp: 98.2  F (36.8  C)   Weight: 65.8 kg (145 lb)     Body mass index is 24.89 kg/m .    General: alert, oriented, no distress  Abd: soft, nontender, nondistended  Incision: healing, well approximated laparoscopic incisions. LLQ and umbilicus still with some glue.    Assessment:  40 year old  with laparoscopy proven endometriosis.     Plan:   1. Endometriosis (Primary)  Discussed GnRH agonists and antagonists. She is interested in working with Dr. Kruse and getting surgery. In the meantime will continue ketorolac, acetaminophen and add gabapentin for when the pain is burning  - gabapentin (NEURONTIN) 100 MG capsule; Take 1 capsule (100 mg) by mouth 3 times daily as needed for neuropathic pain.  Dispense: 60 capsule; Refill: 3    2. Dyspareunia due to medical condition in female  Interested in working with pelvic floor PT.   Discussed endometriosis is cause of deep dyspareunia but PT can help with pelvic floor dysfunction   - Physical Therapy  Referral; Future        Ivette Valenzuela,  MD

## 2025-05-19 NOTE — PATIENT INSTRUCTIONS
[email or web address]  [email or web address]     [facility]   [email or web address]    Depo Lupron - shot 1 mo or 3mo

## 2025-06-03 ENCOUNTER — OFFICE VISIT (OUTPATIENT)
Dept: INTERNAL MEDICINE | Facility: CLINIC | Age: 40
End: 2025-06-03
Payer: COMMERCIAL

## 2025-06-03 ENCOUNTER — PATIENT OUTREACH (OUTPATIENT)
Dept: CARE COORDINATION | Facility: CLINIC | Age: 40
End: 2025-06-03

## 2025-06-03 VITALS
SYSTOLIC BLOOD PRESSURE: 116 MMHG | HEIGHT: 64 IN | OXYGEN SATURATION: 99 % | DIASTOLIC BLOOD PRESSURE: 72 MMHG | TEMPERATURE: 97.6 F | RESPIRATION RATE: 16 BRPM | WEIGHT: 143.2 LBS | HEART RATE: 90 BPM | BODY MASS INDEX: 24.45 KG/M2

## 2025-06-03 DIAGNOSIS — R53.83 OTHER FATIGUE: ICD-10-CM

## 2025-06-03 DIAGNOSIS — Z11.4 SCREENING FOR HIV (HUMAN IMMUNODEFICIENCY VIRUS): ICD-10-CM

## 2025-06-03 DIAGNOSIS — N80.9 ENDOMETRIOSIS: Primary | ICD-10-CM

## 2025-06-03 DIAGNOSIS — Z11.59 ENCOUNTER FOR HCV SCREENING TEST FOR LOW RISK PATIENT: ICD-10-CM

## 2025-06-03 LAB
ALBUMIN SERPL BCG-MCNC: 4.6 G/DL (ref 3.5–5.2)
ALP SERPL-CCNC: 84 U/L (ref 40–150)
ALT SERPL W P-5'-P-CCNC: 13 U/L (ref 0–50)
ANION GAP SERPL CALCULATED.3IONS-SCNC: 13 MMOL/L (ref 7–15)
AST SERPL W P-5'-P-CCNC: 17 U/L (ref 0–45)
BASOPHILS # BLD AUTO: 0 10E3/UL (ref 0–0.2)
BASOPHILS NFR BLD AUTO: 0 %
BILIRUB SERPL-MCNC: 0.3 MG/DL
BUN SERPL-MCNC: 24.3 MG/DL (ref 6–20)
CALCIUM SERPL-MCNC: 9.7 MG/DL (ref 8.8–10.4)
CHLORIDE SERPL-SCNC: 106 MMOL/L (ref 98–107)
CREAT SERPL-MCNC: 0.88 MG/DL (ref 0.51–0.95)
EGFRCR SERPLBLD CKD-EPI 2021: 84 ML/MIN/1.73M2
EOSINOPHIL # BLD AUTO: 0 10E3/UL (ref 0–0.7)
EOSINOPHIL NFR BLD AUTO: 0 %
ERYTHROCYTE [DISTWIDTH] IN BLOOD BY AUTOMATED COUNT: 11.7 % (ref 10–15)
EST. AVERAGE GLUCOSE BLD GHB EST-MCNC: 103 MG/DL
ESTRADIOL SERPL-MCNC: 108 PG/ML
FERRITIN SERPL-MCNC: 116 NG/ML (ref 6–175)
FSH SERPL IRP2-ACNC: 4.5 MIU/ML
GLUCOSE SERPL-MCNC: 107 MG/DL (ref 70–99)
HBA1C MFR BLD: 5.2 % (ref 0–5.6)
HCO3 SERPL-SCNC: 20 MMOL/L (ref 22–29)
HCT VFR BLD AUTO: 44.6 % (ref 35–47)
HCV AB SERPL QL IA: NONREACTIVE
HGB BLD-MCNC: 15.3 G/DL (ref 11.7–15.7)
HIV 1+2 AB+HIV1 P24 AG SERPL QL IA: NONREACTIVE
IMM GRANULOCYTES # BLD: 0 10E3/UL
IMM GRANULOCYTES NFR BLD: 0 %
IRON BINDING CAPACITY (ROCHE): 249 UG/DL (ref 240–430)
IRON SATN MFR SERPL: 36 % (ref 15–46)
IRON SERPL-MCNC: 89 UG/DL (ref 37–145)
LH SERPL-ACNC: 2.4 MIU/ML
LYMPHOCYTES # BLD AUTO: 0.9 10E3/UL (ref 0.8–5.3)
LYMPHOCYTES NFR BLD AUTO: 5 %
MCH RBC QN AUTO: 31.2 PG (ref 26.5–33)
MCHC RBC AUTO-ENTMCNC: 34.3 G/DL (ref 31.5–36.5)
MCV RBC AUTO: 91 FL (ref 78–100)
MONOCYTES # BLD AUTO: 0.6 10E3/UL (ref 0–1.3)
MONOCYTES NFR BLD AUTO: 3 %
NEUTROPHILS # BLD AUTO: 16.8 10E3/UL (ref 1.6–8.3)
NEUTROPHILS NFR BLD AUTO: 91 %
PLATELET # BLD AUTO: 280 10E3/UL (ref 150–450)
POTASSIUM SERPL-SCNC: 4.4 MMOL/L (ref 3.4–5.3)
PROT SERPL-MCNC: 7.9 G/DL (ref 6.4–8.3)
RBC # BLD AUTO: 4.9 10E6/UL (ref 3.8–5.2)
SODIUM SERPL-SCNC: 139 MMOL/L (ref 135–145)
TSH SERPL DL<=0.005 MIU/L-ACNC: 0.83 UIU/ML (ref 0.3–4.2)
VIT B12 SERPL-MCNC: 1015 PG/ML (ref 232–1245)
VIT D+METAB SERPL-MCNC: 31 NG/ML (ref 20–50)
WBC # BLD AUTO: 18.4 10E3/UL (ref 4–11)

## 2025-06-03 PROCEDURE — 82670 ASSAY OF TOTAL ESTRADIOL: CPT | Performed by: INTERNAL MEDICINE

## 2025-06-03 PROCEDURE — 82607 VITAMIN B-12: CPT | Performed by: INTERNAL MEDICINE

## 2025-06-03 PROCEDURE — 84443 ASSAY THYROID STIM HORMONE: CPT | Performed by: INTERNAL MEDICINE

## 2025-06-03 PROCEDURE — 83002 ASSAY OF GONADOTROPIN (LH): CPT | Performed by: INTERNAL MEDICINE

## 2025-06-03 PROCEDURE — 3074F SYST BP LT 130 MM HG: CPT | Performed by: INTERNAL MEDICINE

## 2025-06-03 PROCEDURE — 36415 COLL VENOUS BLD VENIPUNCTURE: CPT | Performed by: INTERNAL MEDICINE

## 2025-06-03 PROCEDURE — 83540 ASSAY OF IRON: CPT | Performed by: INTERNAL MEDICINE

## 2025-06-03 PROCEDURE — 83550 IRON BINDING TEST: CPT | Performed by: INTERNAL MEDICINE

## 2025-06-03 PROCEDURE — 83036 HEMOGLOBIN GLYCOSYLATED A1C: CPT | Performed by: INTERNAL MEDICINE

## 2025-06-03 PROCEDURE — 85025 COMPLETE CBC W/AUTO DIFF WBC: CPT | Performed by: INTERNAL MEDICINE

## 2025-06-03 PROCEDURE — 82728 ASSAY OF FERRITIN: CPT | Performed by: INTERNAL MEDICINE

## 2025-06-03 PROCEDURE — 3078F DIAST BP <80 MM HG: CPT | Performed by: INTERNAL MEDICINE

## 2025-06-03 PROCEDURE — 82306 VITAMIN D 25 HYDROXY: CPT | Performed by: INTERNAL MEDICINE

## 2025-06-03 PROCEDURE — 99213 OFFICE O/P EST LOW 20 MIN: CPT | Mod: 24 | Performed by: INTERNAL MEDICINE

## 2025-06-03 PROCEDURE — 87389 HIV-1 AG W/HIV-1&-2 AB AG IA: CPT | Performed by: INTERNAL MEDICINE

## 2025-06-03 PROCEDURE — 80053 COMPREHEN METABOLIC PANEL: CPT | Performed by: INTERNAL MEDICINE

## 2025-06-03 PROCEDURE — 83001 ASSAY OF GONADOTROPIN (FSH): CPT | Performed by: INTERNAL MEDICINE

## 2025-06-03 PROCEDURE — 86803 HEPATITIS C AB TEST: CPT | Performed by: INTERNAL MEDICINE

## 2025-06-03 RX ORDER — DEXAMETHASONE SODIUM PHOSPHATE 4 MG/ML
INJECTION, SOLUTION INTRAMUSCULAR; INTRAVENOUS
COMMUNITY
Start: 2024-07-03

## 2025-06-03 RX ORDER — GATIFLOXACIN 5 MG/ML
SOLUTION/ DROPS OPHTHALMIC
COMMUNITY
Start: 2024-07-08

## 2025-06-03 RX ORDER — TRETINOIN 0.5 MG/G
CREAM TOPICAL
COMMUNITY
Start: 2025-01-21

## 2025-06-03 RX ORDER — PREDNISONE 10 MG/1
TABLET ORAL
COMMUNITY
Start: 2025-05-29

## 2025-06-03 NOTE — ASSESSMENT & PLAN NOTE
Dx 4/2025 on exploratory laparotomy. She has appt with specialist in August to discuss surgical management. Requests hormone check today. We discussed that checking estrogen really just tells us where she is at in her menstrual cycle, she is understanding but would like the labs checked.   - Ordered CMP, CBC, estradiol, FSH and LH today

## 2025-06-03 NOTE — PROGRESS NOTES
Assessment & Plan   Problem List Items Addressed This Visit          Other    Endometriosis - Primary    Dx 4/2025 on exploratory laparotomy. She has appt with specialist in August to discuss surgical management. Requests hormone check today. We discussed that checking estrogen really just tells us where she is at in her menstrual cycle, she is understanding but would like the labs checked.   - Ordered CMP, CBC, estradiol, FSH and LH today          Relevant Orders    Comprehensive metabolic panel (BMP + Alb, Alk Phos, ALT, AST, Total. Bili, TP)    CBC with platelets and differential    Estradiol    Follicle stimulating hormone    Luteinizing Hormone    Other fatigue    Chronic fatigue, maybe worse in last year.  - Check vit D, vitamin B12, iron, ferritin, CBC         Relevant Orders    TSH with free T4 reflex    Iron and iron binding capacity    Ferritin    Vitamin D Deficiency    Vitamin B12    Hemoglobin A1c     Other Visit Diagnoses         Screening for HIV (human immunodeficiency virus)        Relevant Orders    HIV Antigen Antibody Combo      Encounter for HCV screening test for low risk patient        Relevant Orders    Hepatitis C Screen Reflex to HCV RNA Quant and Genotype           Follow up with GYN and PCP       Subjective   Lexy is a 40 year old, presenting for the following health issues:  Endometriosis and Requesting Labs (BMP, Thyroid, Hormone)        6/3/2025    12:54 PM   Additional Questions   Roomed by Syl Cavanaugh CMA   Accompanied by Self       History of Present Illness     Reason for visit:  Metabolic panel request, thyroid test, possible hormone level check    Endometriosis: Dx on ex lap 4/25/25. Following with GYN, last visit 5/19/25. She is considering surgery. Current pain regimen toradol, APAP and gabapentin. Referred for PFPT for dyspareunia as well.   - Periods are regular. 26 days. Very painful, worse throughout her life likely in 2/2 endometriosis. Will see surgeon end of August  "for full excision.   - LMP 5/13/25    Obesity: wegovy 1 mg weekly     Wondering about basic lab checks - iron, vitamin D, vitamin B12, \"hormone levels\" for estrogen dominance and thyroid.     Does note fatigue. Maybe worse in last few months.     She is working on autoimmune diet as well. Not vegan or vegetarian.     Is taking vitamin D3 2000 international units and NAC. No other supplements or multivitamins.     Is on prednisone for L ankle sprain.     She eats 2-3 servings of fruits and vegetables daily.She consumes 0 sweetened beverage(s) daily.She exercises with enough effort to increase her heart rate 30 to 60 minutes per day.  She exercises with enough effort to increase her heart rate 3 or less days per week.   She is taking medications regularly.            Review of Systems  Constitutional, neuro, ENT, endocrine, pulmonary, cardiac, gastrointestinal, genitourinary, musculoskeletal, integument and psychiatric systems are negative, except as otherwise noted.      Objective    /72 (BP Location: Right arm, Patient Position: Sitting, Cuff Size: Adult Regular)   Pulse 90   Temp 97.6  F (36.4  C) (Oral)   Resp 16   Ht 1.626 m (5' 4\")   Wt 65 kg (143 lb 3.2 oz)   LMP 05/13/2025 (Exact Date)   SpO2 99%   BMI 24.58 kg/m    Body mass index is 24.58 kg/m .    Physical Exam   GENERAL: alert and no distress  EYES: Eyes grossly normal to inspection and conjunctivae and sclerae normal  HENT: nose and mouth without ulcers or lesions  NECK: no adenopathy, no asymmetry, masses, or scars  RESP: lungs clear to auscultation - no rales, rhonchi or wheezes  CV: regular rate and rhythm, normal S1 S2, no S3 or S4, no murmur, click or rub, no peripheral edema  MS: no gross musculoskeletal defects noted, no edema  SKIN: no suspicious lesions or rashes on exposed skin   NEURO: No focal deficits, mentation intact and speech normal  PSYCH: mentation appears normal, affect normal/bright    Results for orders placed or " performed in visit on 06/03/25 (from the past 24 hours)   CBC with platelets and differential    Narrative    The following orders were created for panel order CBC with platelets and differential.  Procedure                               Abnormality         Status                     ---------                               -----------         ------                     CBC with platelets and ...[[id number]]                      In process                   Please view results for these tests on the individual orders.           Signed Electronically by: Marj Gomez MD

## 2025-06-04 ENCOUNTER — RESULTS FOLLOW-UP (OUTPATIENT)
Dept: INTERNAL MEDICINE | Facility: CLINIC | Age: 40
End: 2025-06-04

## 2025-06-07 NOTE — PROGRESS NOTES
Medication Therapy Management (MTM) Encounter    ASSESSMENT:                            Medication Adherence/Access: No issues identified    Weight Management:   Patient would benefit from : she is finishing up her fv cmpd. Semaglutide this week --she did purchase 1 years worth semaglutide thru [employer]- and will start that next week at her current 1.25mg /week dose.  Has new pcp appt. For 8-1-25 .      PLAN:                            1. Lexy--thanks for the info today --per our discussion --finish last week's worth of [place] compound semaglutide then try the [email or web address] weekly formula at 1.25mg./week like your current dose.    Let me know if any issues arise.      Follow-up:  8-1-25 with your new doctor Blake Moore.      SUBJECTIVE/OBJECTIVE:                          Lexy Sinha is a 40 year old female called for a follow-up (4-30-25 )visit. She was referred to me from Perico Gaston--will see new pcp Dr. Moore 8-1-25.  .      Reason for visit:   Fv cmpd semaglutide f/up--went hers route instead.      Allergies/ADRs: Reviewed in chart  Past Medical History: Reviewed in chart  Tobacco: She reports that she has never smoked. She has never been exposed to tobacco smoke. She has never used smokeless tobacco.  Alcohol: Less than 1 beverages / week  E-cigarette Use: none , no MJ use.  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  Personal Healthcare Goals: 130lbs.--goal wt.before covid, was on ozempic then.  Activity: pickleball but injures her ankle , take it easy right now.      Medication Adherence/Access: no issues reported    Weight Management (has OCD as well):  Didn't start sublingual semaglutide daily dose on 4-30-25- went to [email or web address]--bought a 12 month plan- $1,900.00.   Semaglutide vials. -1.25mg./first 2 vials --then she reports back to them online [facility]- hers .  Ship vials /needles--and  anti-nausea med with it. She will start this next week [facility]     Current home weight =143lbs.     -------------------------------------------------------------------------------------------------------------------------------------  [facility] = 20 units /week --but stopped it for  endometriosis surgery --now has restarted last vial of semaglutide at 10 units /week dose and will ramp up weekly until vial is finished  and doing well last clinic weight with shoes =153lbs.--probably -148lbs. at home.      Eats proteins first, lots of water daily. Just started at gym - [employer] - started strength training now.          Previously :   Current semaglutide weekly dose is 1mg. --3rd dose this week .  Home weight is 146lbs. --feels good now.    Eats proteins first, lots of water daily. Just started at gym - [employer] - started strength training now.  Previously :   Semaglutide 1mg./week- doing well -but not losing because off x 2 weeks expecting surgery   Home weight today =150lbs.   Eats proteins first, lots of water daily.  Exercise--lots of pickleball. No strength training yet. Looking for a gym that is close to her.    Previously :   Semaglutide 0.5mg./week-- home weight today = 149.8lbs but 8-21-24 146.6lbs --she is having period right now.  Will pause med x 1 week for elective surgery.  Takes 1 tab culturelle daily --much easier on GI system(95% better).      Previously :   Semaglutide 0.25mg./week-home weight 148lbs.-- today will receive semaglutide 0.5mg ./week dose --she injects on Thursdays.   Tiny bit of nausea/loose stool --very manageable.  Eats proteins first, lots of water daily.  Exercise--lots of pickleball.      Off all phentermine now. 7-5-24.    Previously :  Phentermine 37.5 mg -1/2 tab /day all she could tolerate. (Anxiety on full tab).  Ozempic 0.5mg./week-was on it for wt.loss and too stop food noise -loved it but HP ins. Cut her off it -none  "since mid march 2024.  Patient reports no current medication side effects. When on Ozempic loose stools and heartburn)  Nutrition/Eating Habits:   Exercise/Activity: pickleball if ankle not injured.  Medication History:  Phentermine:    Ozempic: 0.5mg/week worked well when      Wt Readings from Last 4 Encounters:   06/03/25 143 lb 3.2 oz (65 kg)   05/19/25 145 lb (65.8 kg)   04/27/25 153 lb (69.4 kg)   04/25/25 150 lb 12.7 oz (68.4 kg)     Estimated body mass index is 24.58 kg/m  as calculated from the following:    Height as of 6/3/25: 5' 4\" (1.626 m).    Weight as of 6/3/25: 143 lb 3.2 oz (65 kg).      ------------------------------------------------------------------------------------------------------------------      I spent 15 minutes with this patient today. All changes were made via collaborative practice agreement with Perico Gaston MD. A copy of the visit note was provided to the patient's provider(s).    A summary of these recommendations was sent via [employer].    Abby Srinivasan Rph.  Medication Therapy Management Provider  [phone]      Telemedicine Visit Details  The patient's medications can be safely assessed via a telemedicine encounter.  Type of service:  Telephone visit  Originating Location (pt. Location): Home    Distant Location (provider location):  Off-site  Start Time: 1:30  PM  End Time: 1:45 PM     Medication Therapy Recommendations  No medication therapy recommendations to display                    "

## 2025-06-11 ENCOUNTER — VIRTUAL VISIT (OUTPATIENT)
Dept: PHARMACY | Facility: CLINIC | Age: 40
End: 2025-06-11
Payer: COMMERCIAL

## 2025-06-11 DIAGNOSIS — E66.3 OVERWEIGHT (BMI 25.0-29.9): Primary | ICD-10-CM

## 2025-06-11 PROCEDURE — 99606 MTMS BY PHARM EST 15 MIN: CPT | Mod: 93 | Performed by: PHARMACIST

## 2025-06-11 NOTE — PATIENT INSTRUCTIONS
"Recommendations from today's MTM visit:                                                         1. Lexy--thanks for the info today --per our discussion --finish last week's worth of [place] compound semaglutide then try the [email or web address] weekly formula at 1.25mg./week like your current dose.    Let me know if any issues arise.        Follow-up:  8-1-25 with your new doctor Blake Moore.    It was great speaking with you today.  I value your experience and would be very thankful for your time in providing feedback in our clinic survey. In the next few days, you may receive an email or text message from [employer] with a link to a survey related to your  clinical pharmacist.\"     To schedule another MTM appointment, please call the clinic directly or you may call the MTM scheduling line at [phone] or toll-free at [phone].     My Clinical Pharmacist's contact information:                                                      Please feel free to contact me with any questions or concerns you have.      Abby Srinivasan Rph.  Medication Therapy Management Provider  [phone]    "

## 2025-06-11 NOTE — Clinical Note
Blake--I spoke with ezio today --she will see you 8-1-25 to establish care as new pcp --just a heads up she has purchased 1 years worth semaglutide from [email or web address] and will start that next week.  -Abby

## 2025-06-26 ENCOUNTER — ALLIED HEALTH/NURSE VISIT (OUTPATIENT)
Dept: OPTOMETRY | Facility: CLINIC | Age: 40
End: 2025-06-26
Payer: COMMERCIAL

## 2025-06-26 ENCOUNTER — OFFICE VISIT (OUTPATIENT)
Dept: OPTOMETRY | Facility: CLINIC | Age: 40
End: 2025-06-26
Payer: COMMERCIAL

## 2025-06-26 DIAGNOSIS — H04.129 DRY EYE: Primary | ICD-10-CM

## 2025-06-26 DIAGNOSIS — H18.833 RECURRENT EROSION OF BOTH CORNEAS: ICD-10-CM

## 2025-06-26 RX ORDER — SODIUM CHLORIDE FOR INHALATION 0.9 %
3 VIAL, NEBULIZER (ML) INHALATION 3 TIMES DAILY
Qty: 300 ML | Refills: 11 | Status: SHIPPED | OUTPATIENT
Start: 2025-06-26

## 2025-06-26 ASSESSMENT — REFRACTION_CURRENTRX
OD_BASECURVE: 7.1
OD_DIAMETER: 14.9
OD_BASECURVE: 7.1
OS_ADDL_SPECS: OPT EXTRA BLUE HYDRAPEG
OD_SPHERE: -3.37
OS_BRAND: ONEFIT
OD_ADDL_SPECS: OPT EXTRA CLEAR HYDRAPEG
OS_SPHERE: -3.50
OD_ADDL_SPECS: OPT EXTRA CLEAR HYDRAPEG
OS_DIAMETER: 14.9
OD_BRAND: ONEFIT
OD_DIAMETER: 14.9
OS_ADDL_SPECS: OPT EXTRA BLUE HYDRAPEG
OD_BRAND: ONEFIT
OS_SPHERE: -3.50
OS_BASECURVE: 7.1
OS_DIAMETER: 14.9
OS_BASECURVE: 7.1
OS_BRAND: ONEFIT
OD_SPHERE: -3.37

## 2025-06-26 ASSESSMENT — VISUAL ACUITY
CORRECTION_TYPE: CONTACTS
OD_CC: 20/20
METHOD: SNELLEN - LINEAR
OS_CC: 20/20

## 2025-06-26 NOTE — PROGRESS NOTES
No office visit. Billing for lenses dispensed at today's [employer] visit.    Contact Lens Billing  V-Code:  - GP scleral  Final Contact Lens Rx         Brand Base Curve Diameter Sphere Lens Addl. Specs    Right Onefit 7.1 14.9 -3.37 1 flat x 1 steep Opt Extra clear HydraPEG    Left Onefit 7.1 14.9 -3.50 1 flat x 1 steep Opt Extra blue HydraPEG           # of units: 2  Price per Unit: $235  + $100 lighted /stand - pt is aware this is self pay     This patient requires contact lenses that are medically necessary for support and protection of the ocular/corneal surface.    Encounter Diagnoses   Name Primary?    Dry eye Yes    Recurrent erosion of both corneas         Date of last eye exam: 4/15/25, tech I&R only today

## 2025-07-02 ENCOUNTER — OFFICE VISIT (OUTPATIENT)
Dept: OPTOMETRY | Facility: CLINIC | Age: 40
End: 2025-07-02
Payer: COMMERCIAL

## 2025-07-02 DIAGNOSIS — H52.10 MYOPIA WITH REGULAR ASTIGMATISM: ICD-10-CM

## 2025-07-02 DIAGNOSIS — H04.129 DRY EYE: Primary | ICD-10-CM

## 2025-07-02 DIAGNOSIS — H18.833 RECURRENT EROSION OF BOTH CORNEAS: ICD-10-CM

## 2025-07-02 DIAGNOSIS — H52.229 MYOPIA WITH REGULAR ASTIGMATISM: ICD-10-CM

## 2025-07-02 ASSESSMENT — VISUAL ACUITY
OS_CC: 20/20
OD_CC: 20/20
METHOD: SNELLEN - LINEAR
CORRECTION_TYPE: CONTACTS

## 2025-07-02 ASSESSMENT — REFRACTION_CURRENTRX
OS_BASECURVE: 7.1
OD_ADDL_SPECS: OPT EXTRA CLEAR HYDRAPEG
OS_BRAND: ONEFIT
OS_DIAMETER: 14.9
OD_BRAND: ONEFIT
OS_ADDL_SPECS: OPT EXTRA BLUE HYDRAPEG
OD_SPHERE: -3.37
OD_BASECURVE: 7.1
OS_SPHERE: -3.50
OD_DIAMETER: 14.9

## 2025-07-02 ASSESSMENT — REFRACTION_WEARINGRX
SPECS_TYPE: SVL
OD_CYLINDER: +0.75
OS_CYLINDER: +0.50
OS_SPHERE: -4.00
OD_AXIS: 019
OD_SPHERE: -4.25
OS_AXIS: 007

## 2025-07-02 ASSESSMENT — SLIT LAMP EXAM - LIDS
COMMENTS: 2+ MEIBOMIAN GLAND DYSFUNCTION
COMMENTS: 2+ MEIBOMIAN GLAND DYSFUNCTION

## 2025-07-02 ASSESSMENT — TONOMETRY
OS_IOP_MMHG: CL
IOP_METHOD: ICARE
OD_IOP_MMHG: CL

## 2025-07-02 NOTE — PROGRESS NOTES
A/P  1.) Dry Eye with possible RCE OU  -Intolerant to soft lenses (eye pain/irritation on removal that lasts for several days). Issue x several years  -Now on dry eye therapy, referred for scleral lens eval to possibly resume contact lens wear  -Generally good start with scleral lens. I&R improving. Left eye good comfort/fit. Right eye inferior edge awareness, with tighten and do quad PC  -Can push more plus OU but does not tolerate on distance trial - she is okay with current Rx and would like to keep it    Order new right and mail direct. Continue with current left lens. Can follow-up prn with lens concerns, otherwise 1 year    I have confirmed the patient's CC, HPI and reviewed Past Medical History, Past Surgical History, Social History, Family History, Problem List, Medication List and agree with Tech note.     Janell Guerra, OD [facility]

## 2025-07-10 ENCOUNTER — THERAPY VISIT (OUTPATIENT)
Dept: PHYSICAL THERAPY | Facility: CLINIC | Age: 40
End: 2025-07-10
Payer: COMMERCIAL

## 2025-07-10 DIAGNOSIS — N94.19 DYSPAREUNIA DUE TO MEDICAL CONDITION IN FEMALE: ICD-10-CM

## 2025-07-10 NOTE — PROGRESS NOTES
PHYSICAL THERAPY EVALUATION  Type of Visit: Evaluation       Fall Risk Screen:  Have you fallen 2 or more times in the past year?: No  Have you fallen and had an injury in the past year?: No    Subjective  - Pt notes that she had a partial endometriosis excision in April 25th 2025, Pt notes that this has made a big change in her ability to sit for prolonged amount of time. Pt notes that she has had deep penetration pain over the last 25 years or so. Pt notes that she has not tried sexual activity since this procedure. Pt notes that if she is not doing anything the pain is not noticeable. Only time noticing pelvic pain is with penetration. Pt has dilators but does not use them anymore.         Presenting condition or subjective complaint: Endometriosis  Date of onset: 05/19/25    Relevant medical history:     Dates & types of surgery: Laparoscopy 04/25, partial hymenectomy 20 years ago    Prior diagnostic imaging/testing results: Other Laparoscopy   Prior therapy history for the same diagnosis, illness or injury: Yes Talk therapy, Pelvic floor therapy (several times over 10-15 years)    Living Environment  Social support: With a significant other or spouse   Type of home: House   Stairs to enter the home: Yes   Is there a railing: Yes     Ramp: No   Stairs inside the home: Yes   Is there a railing: Yes     Help at home: None  Equipment owned:       Employment: Yes    Hobbies/Interests:      Patient goals for therapy: Penetration     Objective      PELVIC EVALUATION  ADDITIONAL HISTORY:  Sex assigned at birth: Female  Gender identity: Female    Pronouns: She/Her Hers      Bladder History: High frequency prior to excision that is better now. Pt goes every 1-1.5 hours, works from home most of the time.   Feels bladder filling: No  Triggers for feeling of inability to wait to go to the bathroom: No    How long can you wait to urinate:    Gets up at night to urinate: Yes 1-3  Can stop the flow of urine when urinating:  Yes  Volume of urine usually released: Large   Other issues:   Feels like she empties fully, denies pushing to urinate.   Number of bladder infections in last 12 months:   No.   Fluid intake per day:       Electrolytes in water bottle, 1.5 bottles per day.   Medications taken for bladder: No     Activities causing urine leak:      Amount of urine typically leaked:    Pads used to help with leaking: No        Bowel History: Fluctuations, she feels it may be related to endo. Trying to do some AIP diet to prevent inflammation. Difficult to say if a food triggers.   Frequency of bowel movement:    Consistency of stool:      Ignores the urge to defecate: No  Other bowel issues:   Sometimes has to strains, 3x per week. Straining can cause some pain.   Length of time spent trying to have a bowel movement:      Sexual Function History:  Sexual orientation: Bisexual    Sexually active: Yes  Lubrication used: Yes Yes  Pelvic pain: Initial penetration (rectal or vaginal); Deep penetration (rectal or vaginal); Pelvic exams; Use of tampon   Throughout life, first pap was under anesthesia.  Only one position on her back and on her side, 7/10. Typically stops when pain gets high.   Pain or difficulty with orgasms/erection/ejaculation: Yes   - maybe some pain lingering regardless of penetration  State of menopause: Perimenopause (have not gone through menopause yet)  Hormone medications: No      Are you currently pregnant: No  Number of previous pregnancies: 0  Number of deliveries: 0  Have you been diagnosed with pelvic prolapse or abdominal separation: No  Do you get regular exercise: Yes - some walking, weightlifting and bar   Have you tried pelvic floor strengthening exercises for 4 weeks: No  Do you have any history of trauma that is relevant to your care that you d like to share: No      Discussed reason for referral regarding pelvic health needs and external/internal pelvic floor muscle examination with patient/guardian.   Opportunity provided to ask questions and verbal consent for assessment and intervention was given.    POSTURE: Sitting Posture: Rounded shoulders, Forward head  LUMBAR SCREEN: mod loss into flexion, min loss into extension and rotation   HIP SCREEN:  Strength:   Pain: - none + mild ++ moderate +++ severe  Strength Scale: 0-5/5 Left Right   Hip Flexion 5 5-   Hip Internal Rotation 5- 5-   Hip External Rotation 4 4+      Functional Strength Testing: Double Leg Squat: Good technique/no significant findings  SLS: WFL     PELVIC/SI SCREEN:  Hypermobile into ER, hypomobile into IR      Beightons: 4/9    PELVIC EXAM  External Visual Inspection:  At rest: Normal  With voluntary pelvic floor contraction: Uncertain  Relaxation of PFM: Partial/delayed relaxation  With intra-abdominal pressure: Bearing down as defecation: small descent     Integumentary:   Introitus: Unremarkable    External Digital Palpation per Perineum:   Ischiocavernosis: Unremarkable  Bulbo cavernosis: Unremarkable  Transverse perineal: Tightness, Tenderness  Levator ani: Tightness, Tenderness    Internal Digital Palpation:  Per Vagina:  Tenderness, to bilat 1st layer PFM, did not advance past 1/2nd layer today due to pain   Myofascial Resistance to Palpation: Firm  Digital Muscle Performance: P (Power): 2/5, initially absent until relaxation exercises   Compensations: adductors   Relaxation Post-Contraction: delayed      ABDOMINAL ASSESSMENT  Scar:   Location/Type: laparscopic  Mobility: Hypomobile    Fascial Tension/Restriction: Hypomobile    Assessment & Plan   CLINICAL IMPRESSIONS  Medical Diagnosis: Dyspareunia due to medical condition in female    Treatment Diagnosis: Dyspareunia due to medical condition in female   Impression/Assessment: Patient is a 40 year old female with dyspareunia complaints.  The following significant findings have been identified: Pain, Decreased ROM/flexibility, Decreased joint mobility, Decreased strength, Decreased  proprioception, Impaired muscle performance, Decreased activity tolerance, and Impaired posture. These impairments interfere with their ability to perform self care tasks and recreational activities as compared to previous level of function.     Clinical Decision Making (Complexity):  Clinical Presentation: Stable/Uncomplicated  Clinical Presentation Rationale: based on medical and personal factors listed in PT evaluation  Clinical Decision Making (Complexity): Low complexity    PLAN OF CARE  Treatment Interventions:  Modalities: Biofeedback  Interventions: Manual Therapy, Neuromuscular Re-education, Therapeutic Activity, Therapeutic Exercise, Self-Care/Home Management    Long Term Goals     PT Goal 1  Goal Identifier: Dysparuenia  Goal Description: Pt will be able to tolerate small dilator with <3/10 discomfort.  Rationale: to maximize safety and independence with self cares  Target Date: 09/08/25  PT Goal 2  Goal Identifier: Penetrative [place]  Goal Description: Pt will have <3/10 discomfort with penetrative intercourse  Rationale: to maximize safety and independence with self cares  Target Date: 10/07/25      Frequency of Treatment: 1x per week for 4 weeks, 2x per month for 2 months  Duration of Treatment: 12 weeks    Education Assessment:   Learner/Method: Patient;No Barriers to Learning    Risks and benefits of evaluation/treatment have been explained.   Patient/Family/caregiver agrees with Plan of Care.     Evaluation Time:     PT Eval, Low Complexity Minutes (00988): 27     Signing Clinician: Delmi Becerril PT

## 2025-07-16 ENCOUNTER — THERAPY VISIT (OUTPATIENT)
Dept: PHYSICAL THERAPY | Facility: CLINIC | Age: 40
End: 2025-07-16
Attending: OBSTETRICS & GYNECOLOGY
Payer: COMMERCIAL

## 2025-07-16 DIAGNOSIS — N94.19 DYSPAREUNIA DUE TO MEDICAL CONDITION IN FEMALE: Primary | ICD-10-CM

## 2025-07-16 PROCEDURE — 97530 THERAPEUTIC ACTIVITIES: CPT | Mod: GP

## 2025-07-16 PROCEDURE — 97140 MANUAL THERAPY 1/> REGIONS: CPT | Mod: GP

## 2025-07-23 ENCOUNTER — THERAPY VISIT (OUTPATIENT)
Dept: PHYSICAL THERAPY | Facility: CLINIC | Age: 40
End: 2025-07-23
Attending: OBSTETRICS & GYNECOLOGY
Payer: COMMERCIAL

## 2025-07-23 DIAGNOSIS — N94.19 DYSPAREUNIA DUE TO MEDICAL CONDITION IN FEMALE: Primary | ICD-10-CM

## 2025-07-23 PROCEDURE — 97140 MANUAL THERAPY 1/> REGIONS: CPT | Mod: GP

## 2025-07-23 PROCEDURE — 97110 THERAPEUTIC EXERCISES: CPT | Mod: GP

## 2025-08-01 PROBLEM — Z00.00 ROUTINE GENERAL MEDICAL EXAMINATION AT A HEALTH CARE FACILITY: Status: ACTIVE | Noted: 2025-08-01

## 2025-08-01 PROBLEM — N20.1 URETERAL STONE: Status: ACTIVE | Noted: 2020-11-24

## 2025-08-13 ENCOUNTER — TELEPHONE (OUTPATIENT)
Dept: OPHTHALMOLOGY | Facility: CLINIC | Age: 40
End: 2025-08-13

## 2025-08-13 ENCOUNTER — MYC MEDICAL ADVICE (OUTPATIENT)
Dept: OPTOMETRY | Facility: CLINIC | Age: 40
End: 2025-08-13

## 2025-08-13 ENCOUNTER — THERAPY VISIT (OUTPATIENT)
Dept: PHYSICAL THERAPY | Facility: CLINIC | Age: 40
End: 2025-08-13
Payer: COMMERCIAL

## 2025-08-13 DIAGNOSIS — N94.19 DYSPAREUNIA DUE TO MEDICAL CONDITION IN FEMALE: Primary | ICD-10-CM

## 2025-08-13 PROCEDURE — 97110 THERAPEUTIC EXERCISES: CPT | Mod: GP

## 2025-08-13 PROCEDURE — 97140 MANUAL THERAPY 1/> REGIONS: CPT | Mod: GP

## 2025-08-14 ENCOUNTER — TELEPHONE (OUTPATIENT)
Dept: OPHTHALMOLOGY | Facility: CLINIC | Age: 40
End: 2025-08-14
Payer: COMMERCIAL

## 2025-08-27 ENCOUNTER — OFFICE VISIT (OUTPATIENT)
Dept: OPHTHALMOLOGY | Facility: CLINIC | Age: 40
End: 2025-08-27
Attending: OPHTHALMOLOGY
Payer: COMMERCIAL

## 2025-08-27 DIAGNOSIS — H04.123 DRY EYE SYNDROME OF BOTH EYES: Primary | ICD-10-CM

## 2025-08-27 PROCEDURE — 99213 OFFICE O/P EST LOW 20 MIN: CPT | Performed by: OPHTHALMOLOGY

## 2025-08-27 ASSESSMENT — REFRACTION_WEARINGRX
OS_CYLINDER: +0.50
OD_AXIS: 019
SPECS_TYPE: SVL
OD_SPHERE: -4.25
OS_AXIS: 007
OS_SPHERE: -4.00
OD_CYLINDER: +0.75

## 2025-08-27 ASSESSMENT — TONOMETRY
IOP_METHOD: ICARE
OS_IOP_MMHG: 18
OD_IOP_MMHG: 13

## 2025-08-27 ASSESSMENT — SLIT LAMP EXAM - LIDS
COMMENTS: 2+ MEIBOMIAN GLAND DYSFUNCTION
COMMENTS: 2+ MEIBOMIAN GLAND DYSFUNCTION

## 2025-08-27 ASSESSMENT — VISUAL ACUITY
OS_CC: 20/25
OD_CC: 20/20
METHOD: SNELLEN - LINEAR
CORRECTION_TYPE: GLASSES
OS_CC+: -1

## 2025-08-28 ENCOUNTER — OFFICE VISIT (OUTPATIENT)
Dept: OPHTHALMOLOGY | Facility: CLINIC | Age: 40
End: 2025-08-28
Payer: COMMERCIAL

## 2025-08-28 ENCOUNTER — MYC MEDICAL ADVICE (OUTPATIENT)
Dept: FAMILY MEDICINE | Facility: CLINIC | Age: 40
End: 2025-08-28

## 2025-08-28 DIAGNOSIS — H04.123 DRY EYE SYNDROME OF BOTH EYES: Primary | ICD-10-CM

## 2025-08-28 ASSESSMENT — REFRACTION_CURRENTRX
OD_BASECURVE: 7.1
OD_SPHERE: -2.00
OD_DIAMETER: 15.4
OD_DIAMETER: 14.9
OD_BRAND: ONEFIT
OS_DIAMETER: 14.9
OD_DIAMETER: 15.4
OS_BRAND: ZENLENS RC
OS_DIAMETER: 15.4
OD_SPHERE: -3.37
OD_BASECURVE: 4.6/7.34
OS_BASECURVE: 7.1
OS_SPHERE: -3.50
OD_ADDL_SPECS: OPT EXTRA CLEAR HYDRAPEG
OS_SPHERE: -2.00
OS_BRAND: ONEFIT
OD_BRAND: ZENLENS RC
OD_SPHERE: -2.00
OS_ADDL_SPECS: OPT EXTRA BLUE HYDRAPEG

## 2025-08-28 ASSESSMENT — VISUAL ACUITY
OD_CC+: -1
OS_CC+: -2
CORRECTION_TYPE: GLASSES
OD_CC: 20/20
METHOD: SNELLEN - LINEAR
OS_CC: 20/25

## 2025-08-28 ASSESSMENT — REFRACTION_WEARINGRX
OS_CYLINDER: +0.50
OD_SPHERE: -4.25
SPECS_TYPE: SVL
OS_SPHERE: -4.00
OD_CYLINDER: +0.75
OS_AXIS: 007
OD_AXIS: 019

## 2025-08-28 ASSESSMENT — SLIT LAMP EXAM - LIDS
COMMENTS: 2+ MEIBOMIAN GLAND DYSFUNCTION
COMMENTS: 2+ MEIBOMIAN GLAND DYSFUNCTION

## 2025-08-28 ASSESSMENT — TONOMETRY
OS_IOP_MMHG: 16
OD_IOP_MMHG: 19
IOP_METHOD: ICARE

## 2025-09-03 ENCOUNTER — VIRTUAL VISIT (OUTPATIENT)
Dept: FAMILY MEDICINE | Facility: CLINIC | Age: 40
End: 2025-09-03
Payer: COMMERCIAL

## 2025-09-03 DIAGNOSIS — G43.909 MIGRAINE WITHOUT STATUS MIGRAINOSUS, NOT INTRACTABLE, UNSPECIFIED MIGRAINE TYPE: Primary | ICD-10-CM

## 2025-09-03 DIAGNOSIS — N80.9 ENDOMETRIOSIS: ICD-10-CM

## 2025-09-03 DIAGNOSIS — M25.50 MULTIPLE JOINT PAIN: ICD-10-CM

## 2025-09-03 DIAGNOSIS — R41.89 BRAIN FOG: ICD-10-CM

## 2025-09-03 DIAGNOSIS — R53.83 OTHER FATIGUE: ICD-10-CM

## 2025-09-03 PROCEDURE — 98006 SYNCH AUDIO-VIDEO EST MOD 30: CPT | Performed by: STUDENT IN AN ORGANIZED HEALTH CARE EDUCATION/TRAINING PROGRAM

## 2025-09-03 RX ORDER — SUMATRIPTAN SUCCINATE 25 MG/1
25 TABLET ORAL
Qty: 12 TABLET | Refills: 0 | Status: SHIPPED | OUTPATIENT
Start: 2025-09-03

## (undated) DEVICE — SOLUTION IRRIGATION 0.9% NACL 1000ML R5200-01

## (undated) DEVICE — ENDO TROCAR SLEEVE KII ADV FIXATION 05X100MM CFS02

## (undated) DEVICE — SUCTION IRR STRYKERFLOW II W/TIP 250-070-520

## (undated) DEVICE — STRAP KNEE/BODY 31143004

## (undated) DEVICE — PAD CHUX UNDERPAD 30X36" P3036C

## (undated) DEVICE — LINEN TOWEL PACK X5 5464

## (undated) DEVICE — GLOVE BIOGEL PI MICRO INDICATOR UNDERGLOVE SZ 7.0 48970

## (undated) DEVICE — ENDO TROCAR FIRST ENTRY KII FIOS ADV FIX 05X100MM CFF03

## (undated) DEVICE — SUCTION MANIFOLD NEPTUNE 2 SYS 4 PORT 0702-020-000

## (undated) DEVICE — ESU ENDO SCISSORS 5MM CVD 5DCS

## (undated) DEVICE — TUBING SMOKE EVAC PNEUMOCLEAR HEATED 0620050350

## (undated) DEVICE — ESU GROUND PAD UNIVERSAL W/O CORD

## (undated) DEVICE — PREP DYNA-HEX 4% CHG SCRUB 4OZ BOTTLE MDS098710

## (undated) DEVICE — CATH TRAY FOLEY SURESTEP 16FR WDRAIN BAG STLK LATEX A300316A

## (undated) DEVICE — ESU HOLDER LAP INST DISP PURPLE LONG 330MM H-PRO-330

## (undated) DEVICE — CLEANER INST PRE-KLENZ SOAK SHIELD TUBE 6 ML MEDIUM 2D66J4

## (undated) DEVICE — GLOVE BIOGEL PI ULTRATOUCH G SZ 6.5 42165

## (undated) DEVICE — LINEN GOWN X4 5410

## (undated) DEVICE — Device

## (undated) DEVICE — SU VICRYL 4-0 PS-2 18" UND J496H

## (undated) DEVICE — ESU LIGASURE LAPAROSCOPIC BLUNT TIP SEALER 5MMX37CM LF1837

## (undated) DEVICE — SU DERMABOND ADVANCED .7ML DNX12

## (undated) RX ORDER — HYDROMORPHONE HYDROCHLORIDE 1 MG/ML
INJECTION, SOLUTION INTRAMUSCULAR; INTRAVENOUS; SUBCUTANEOUS
Status: DISPENSED
Start: 2025-04-25

## (undated) RX ORDER — APREPITANT 40 MG/1
CAPSULE ORAL
Status: DISPENSED
Start: 2025-04-25

## (undated) RX ORDER — ACETAMINOPHEN 325 MG/1
TABLET ORAL
Status: DISPENSED
Start: 2025-04-25

## (undated) RX ORDER — OXYCODONE HYDROCHLORIDE 5 MG/1
TABLET ORAL
Status: DISPENSED
Start: 2025-04-25

## (undated) RX ORDER — FENTANYL CITRATE 50 UG/ML
INJECTION, SOLUTION INTRAMUSCULAR; INTRAVENOUS
Status: DISPENSED
Start: 2025-04-25

## (undated) RX ORDER — BUPIVACAINE HYDROCHLORIDE 2.5 MG/ML
INJECTION, SOLUTION EPIDURAL; INFILTRATION; INTRACAUDAL; PERINEURAL
Status: DISPENSED
Start: 2025-04-25